# Patient Record
Sex: FEMALE | Race: OTHER | HISPANIC OR LATINO | Employment: FULL TIME | URBAN - METROPOLITAN AREA
[De-identification: names, ages, dates, MRNs, and addresses within clinical notes are randomized per-mention and may not be internally consistent; named-entity substitution may affect disease eponyms.]

---

## 2017-07-16 ENCOUNTER — HOSPITAL ENCOUNTER (OUTPATIENT)
Dept: RADIOLOGY | Facility: MEDICAL CENTER | Age: 22
End: 2017-07-16
Attending: FAMILY MEDICINE
Payer: COMMERCIAL

## 2017-07-16 ENCOUNTER — OFFICE VISIT (OUTPATIENT)
Dept: URGENT CARE | Facility: PHYSICIAN GROUP | Age: 22
End: 2017-07-16
Payer: COMMERCIAL

## 2017-07-16 VITALS
BODY MASS INDEX: 30.24 KG/M2 | HEIGHT: 59 IN | DIASTOLIC BLOOD PRESSURE: 66 MMHG | RESPIRATION RATE: 18 BRPM | HEART RATE: 76 BPM | TEMPERATURE: 98.4 F | WEIGHT: 150 LBS | SYSTOLIC BLOOD PRESSURE: 118 MMHG | OXYGEN SATURATION: 96 %

## 2017-07-16 DIAGNOSIS — R10.13 EPIGASTRIC PAIN: ICD-10-CM

## 2017-07-16 DIAGNOSIS — K59.00 CONSTIPATION, UNSPECIFIED CONSTIPATION TYPE: ICD-10-CM

## 2017-07-16 DIAGNOSIS — R42 DIZZINESS: ICD-10-CM

## 2017-07-16 DIAGNOSIS — R11.0 NAUSEA: ICD-10-CM

## 2017-07-16 LAB
APPEARANCE UR: CLEAR
BILIRUB UR STRIP-MCNC: NORMAL MG/DL
COLOR UR AUTO: YELLOW
GLUCOSE UR STRIP.AUTO-MCNC: NORMAL MG/DL
INT CON NEG: NEGATIVE
INT CON POS: POSITIVE
KETONES UR STRIP.AUTO-MCNC: NORMAL MG/DL
LEUKOCYTE ESTERASE UR QL STRIP.AUTO: NORMAL
NITRITE UR QL STRIP.AUTO: NORMAL
PH UR STRIP.AUTO: 6.5 [PH] (ref 5–8)
POC URINE PREGNANCY TEST: NORMAL
PROT UR QL STRIP: NORMAL MG/DL
RBC UR QL AUTO: NORMAL
SP GR UR STRIP.AUTO: 1.01
UROBILINOGEN UR STRIP-MCNC: NORMAL MG/DL

## 2017-07-16 PROCEDURE — 81002 URINALYSIS NONAUTO W/O SCOPE: CPT | Performed by: FAMILY MEDICINE

## 2017-07-16 PROCEDURE — 99214 OFFICE O/P EST MOD 30 MIN: CPT | Performed by: FAMILY MEDICINE

## 2017-07-16 PROCEDURE — 81025 URINE PREGNANCY TEST: CPT | Performed by: FAMILY MEDICINE

## 2017-07-16 PROCEDURE — 74020 DX-ABDOMEN-2 VIEWS: CPT

## 2017-07-16 NOTE — PROGRESS NOTES
Chief Complaint:    Chief Complaint   Patient presents with   • Dizziness     dizziness, nausea x5 days       History of Present Illness:    This is a new problem. For past 5 days, she has unpredictable dizziness, nausea (not associated with eating), and occl epigastric abdominal pain (can feel burning). Currently she is not dizzy or nauseous. Last felt dizzy yesterday. Has been having smaller volume of stools over the past week. Had recent bronchitis type of symptoms, but got better and was fine for around 2 weeks before current symptoms started. No tinnitus or decreased hearing. No fever, chills, vomiting, or urine symptoms. Has all abdominal organs. No antacid medication taken for symptoms. Mom had H. pylori and she is concerned she could have this.      Review of Systems:    Constitutional: Negative for fever, chills, and diaphoresis.   Eyes: Negative for change in vision, photophobia, pain, redness, and discharge.  ENT: Negative for ear pain, ear discharge, hearing loss, tinnitus, nasal congestion, nosebleeds, and sore throat.    Respiratory: Negative for cough, hemoptysis, sputum production, shortness of breath, wheezing, and stridor.    Cardiovascular: Negative for chest pain, palpitations, orthopnea, claudication, leg swelling, and PND.   Gastrointestinal: See HPI.  Genitourinary: Negative for dysuria, urinary urgency, urinary frequency, hematuria, and flank pain.   Musculoskeletal: Negative for myalgias, joint pain, neck pain, and back pain.   Skin: Negative for rash and itching.   Neurological: See HPI.  Endo: Negative for polydipsia.   Heme: Does not bruise/bleed easily.   Psychiatric/Behavioral: Negative for depression, suicidal ideas, hallucinations, memory loss and substance abuse. The patient is not nervous/anxious and does not have insomnia.      Past Medical History:    History reviewed. No pertinent past medical history.    Past Surgical History:    History reviewed. No pertinent past surgical  "history.    Social History:    Social History     Social History   • Marital Status: Single     Spouse Name: N/A   • Number of Children: N/A   • Years of Education: N/A     Occupational History   • Not on file.     Social History Main Topics   • Smoking status: Never Smoker    • Smokeless tobacco: Not on file   • Alcohol Use: No   • Drug Use: No   • Sexual Activity: Not on file     Other Topics Concern   • Not on file     Social History Narrative       Family History:    History reviewed. No pertinent family history.    Medications:    No current outpatient prescriptions on file prior to visit.     No current facility-administered medications on file prior to visit.       Allergies:    No Known Allergies      Vitals:    Filed Vitals:    07/16/17 1441   BP: 118/66   Pulse: 76   Temp: 36.9 °C (98.4 °F)   Resp: 18   Height: 1.499 m (4' 11.02\")   Weight: 68.04 kg (150 lb)   SpO2: 96%       Physical Exam:    Constitutional: Vital signs reviewed. Appears well-developed and well-nourished. No acute distress.   Eyes: Sclera white, conjunctivae clear. PERRLA.  ENT: External ears normal. External auditory canals normal without discharge. TMs translucent and non-bulging. Hearing normal. Nasal mucosa pink. Lips/teeth are normal. Oral mucosa pink and moist. Posterior pharynx: WNL.   Neck: Neck supple.   Cardiovascular: Regular rate and rhythm. No murmur.   Pulmonary/Chest: Respirations non-labored. Clear to auscultation bilaterally.  Abdomen: Bowel sounds are normal active. Soft, non-distended, and non-tender to palpation. No hepatosplenomegaly.   Lymph: Cervical nodes without tenderness or enlargement.  Musculoskeletal: Normal gait. Normal range of motion. No muscular atrophy or weakness.  Neurological: Alert and oriented to person, place, and time. CN 2-12 intact. Muscle tone normal. Coordination normal. Light touch and sensation normal. [place] Hallpike maneuver negative bilaterally.  Skin: No rashes or lesions. Warm, dry, normal " turgor.  Psychiatric: Normal mood and affect. Behavior is normal. Judgment and thought content normal.     Diagnostics:    [id number]-[id number]-2 VIEWS (Order #[id number]) on 7/16/17       Narrative        7/16/2017 3:15 PM    HISTORY/REASON FOR EXAM:  Abdominal Pain.  Epigastric abdominal pain, nausea, dizziness    TECHNIQUE/EXAM DESCRIPTION AND NUMBER OF VIEWS:  2 view(s) of the abdomen.    COMPARISON: None    FINDINGS:  No evidence for small bowel obstruction.  Mildly increased colonic stool.  No gross mass or abnormal calcification.  Minimal S-shaped curvature of thoracolumbar spine.         Impression        No evidence of bowel obstruction.     Images and Rad report reviewed with her and copy of report to her.    POCT URINALYSIS (Order #[id number]) on 7/16/17       Component Results      Component Value Ref Range & Units Status     POC Color yellow Negative Final     POC Appearance clear Negative Final     POC Leukocyte Esterase neg Negative Final     POC Nitrites neg Negative Final     POC Urobiligen neg Negative (0.2) mg/dL Final     POC Protein neg Negative mg/dL Final     POC Urine PH 6.5 5.0 - 8.0 Final     POC Blood neg Negative Final     POC Specific Gravity 1.010 <1.005 - >1.030 Final     POC Ketones neg Negative mg/dL Final     POC Biliruben neg Negative mg/dL Final     POC Glucose neg Negative mg/dL Final         Last Resulted Time     Sun Jul 16, 2017  3:33 PM     POCT PREGNANCY (Order #[id number]) on 7/16/17       Component Results      Component Value Ref Range & Units Status     POC Urine Pregnancy Test NEG Negative Final     Internal Control Positive Positive  Final     Internal Control Negative Negative  Final         Last Resulted Time     Sun Jul 16, 2017  3:17 PM       Assessment / Plan:    1. Nausea  - POCT Urinalysis  - POCT PREGNANCY  - H. PYLORI, UREA BREATH TEST, ADULT; Future    2. Dizziness  - POCT Urinalysis  - POCT PREGNANCY  - H. PYLORI, UREA BREATH TEST, ADULT; Future    3. Epigastric pain  -  [id number]-9 VIEWS; Future  - H. PYLORI, UREA BREATH TEST, ADULT; Future    4. Constipation, unspecified constipation type  - [id number]-3 VIEWS; Future      Discussed with her DDX and management options.    Vitals and exam are benign. Diagnostics are non-revealing. Currently in room she feels well and does not feel need for any Rx medication.    I do not feel the mildly increased stool on abdominal x-rays will cause her symptoms. Rec'd increase fiber intake.    Agreeable to H. pylori breath test ordered.    Says may call [phone] (M) with result and OK to leave message with result.    Follow-up with PCP or urgent care if getting worse while waiting for test result.

## 2017-07-16 NOTE — MR AVS SNAPSHOT
"        Mary Jane Larsonron   2017 2:30 PM   Office Visit   MRN: [id number]    Department:  [facility]   Dept Phone:  [phone]    Description:  Female : 1995   Provider:  Lalit Martinez M.D.           Reason for Visit     Dizziness dizziness, nausea x5 days      Allergies as of 2017     No Known Allergies      You were diagnosed with     Nausea   [825044]       Dizziness   [987336]       Epigastric pain   [181389]       Constipation, unspecified constipation type   [2642244]         Vital Signs     Blood Pressure Pulse Temperature Respirations Height Weight    118/66 mmHg 76 36.9 °C (98.4 °F) 18 1.499 m (4' 11.02\") 68.04 kg (150 lb)    Body Mass Index Oxygen Saturation Smoking Status             30.28 kg/m2 96% Never Smoker          Basic Information     Date Of Birth Sex Race Ethnicity Preferred Language    1995 Female  or   Origin (Romanian,Malaysian,Argentine,[place]er, etc) English      Health Maintenance        Date Due Completion Dates    IMM HPV VACCINE (1 of 3 - Female 3 Dose Series) 2006 ---    IMM VARICELLA (CHICKENPOX) VACCINE (1 of 2 - 2 Dose Adolescent Series) 2008 ---    IMM HEP A VACCINE (2 of 2 - Standard Series) 2009    PAP SMEAR 2016 ---    IMM INFLUENZA (1) 2016, 2016, 3/5/2015    IMM DTaP/Tdap/Td Vaccine (7 - Td) 2019, 1999, 1997, 1996, 1995, 1995            Results     POCT Urinalysis      Component Value Standard Range & Units    POC Color yellow Negative    POC Appearance clear Negative    POC Leukocyte Esterase neg Negative    POC Nitrites neg Negative    POC Urobiligen neg Negative (0.2) mg/dL    POC Protein neg Negative mg/dL    POC Urine PH 6.5 5.0 - 8.0    POC Blood neg Negative    POC Specific Gravity 1.010 <1.005 - >1.030    POC Ketones neg Negative mg/dL    POC Biliruben neg Negative mg/dL    POC Glucose neg Negative mg/dL                POCT " PREGNANCY      Component Value Standard Range & Units    POC Urine Pregnancy Test NEG Negative    Internal Control Positive Positive     Internal Control Negative Negative                         Current Immunizations     Dtap Vaccine 7/16/1999, 6/6/1997, 1/22/1996, 1995, 1995    Hepatitis A Vaccine, Ped/Adol 2/12/2009    Hepatitis B Vaccine Non-Recombivax (Ped/Adol) 1/22/1996, 1995, 1995    Influenza TIV (IM) 11/26/2016, 1/9/2016    Influenza Vaccine Quad Inj (Preserved) 3/5/2015    MMR Vaccine 7/19/1999, 6/6/1997    Tdap Vaccine 8/12/2009      Below and/or attached are the medications your provider expects you to take. Review all of your home medications and newly ordered medications with your provider and/or pharmacist. Follow medication instructions as directed by your provider and/or pharmacist. Please keep your medication list with you and share with your provider. Update the information when medications are discontinued, doses are changed, or new medications (including over-the-counter products) are added; and carry medication information at all times in the event of emergency situations     Allergies:  No Known Allergies          Medications  Valid as of: July 16, 2017 -  4:05 PM    Generic Name Brand Name Tablet Size Instructions for use    .                 Medicines prescribed today were sent to:     [facility][id number] - [place], [place] - [address] [place] [id number]    Phone: [phone] Fax: [phone]    Open 24 Hours?: No      Medication refill instructions:       If your prescription bottle indicates you have medication refills left, it is not necessary to call your provider’s office. Please contact your pharmacy and they will refill your medication.    If your prescription bottle indicates you do not have any refills left, you may request refills at any time through one of the following ways: The online Intelligent Mobile Support system (except  Urgent Care), by calling your provider’s office, or by asking your pharmacy to contact your provider’s office with a refill request. Medication refills are processed only during regular business hours and may not be available until the next business day. Your provider may request additional information or to have a follow-up visit with you prior to refilling your medication.   *Please Note: Medication refills are assigned a new Rx number when refilled electronically. Your pharmacy may indicate that no refills were authorized even though a new prescription for the same medication is available at the pharmacy. Please request the medicine by name with the pharmacy before contacting your provider for a refill.        Your To Do List     Future Labs/Procedures Complete By Expires    BD-[id number]-5 VIEWS  As directed 7/23/2017    H. PYLORI, UREA BREATH TEST, ADULT  As directed 7/30/2017         CO-Value Access Code: [email or web address]  Expires: 8/15/2017  4:05 PM    CO-Value  A secure, online tool to manage your health information     [employer]’s CO-Value® is a secure, online tool that connects you to your personalized health information from the privacy of your home -- day or night - making it very easy for you to manage your healthcare. Once the activation process is completed, you can even access your medical information using the CO-Value selina, which is available for free in the Apple Selina store or Google Play store.     CO-Value provides the following levels of access (as shown below):   My Chart Features   [facility]  Urgent  Care [facility]  Primary Care  Doctor   Email your healthcare team securely and privately 24/7 X X X    Manage appointments: schedule your next appointment; view details of past/upcoming appointments X      Request prescription refills. X      View recent personal medical records, including lab and immunizations X X X X   View health record, including health  history, allergies, medications X X X X   Read reports about your outpatient visits, procedures, consult and ER notes X X X X   See your discharge summary, which is a recap of your hospital and/or ER visit that includes your diagnosis, lab results, and care plan. X X       How to register for Loot!:  1. Go to  [email or web address].  2. Click on the Sign Up Now box, which takes you to the New Member Sign Up page. You will need to provide the following information:  a. Enter your Loot! Access Code exactly as it appears at the top of this page. (You will not need to use this code after you’ve completed the sign-up process. If you do not sign up before the expiration date, you must request a new code.)   b. Enter your date of birth.   c. Enter your home email address.   d. Click Submit, and follow the next screen’s instructions.  3. Create a Loot! ID. This will be your Loot! login ID and cannot be changed, so think of one that is secure and easy to remember.  4. Create a Contribt password. You can change your password at any time.  5. Enter your Password Reset Question and Answer. This can be used at a later time if you forget your password.   6. Enter your e-mail address. This allows you to receive e-mail notifications when new information is available in Loot!.  7. Click Sign Up. You can now view your health information.    For assistance activating your Loot! account, call [phone]

## 2017-07-22 ENCOUNTER — HOSPITAL ENCOUNTER (OUTPATIENT)
Dept: LAB | Facility: MEDICAL CENTER | Age: 22
End: 2017-07-22
Attending: FAMILY MEDICINE
Payer: COMMERCIAL

## 2017-07-22 DIAGNOSIS — R11.0 NAUSEA: ICD-10-CM

## 2017-07-22 DIAGNOSIS — R10.13 EPIGASTRIC PAIN: ICD-10-CM

## 2017-07-22 DIAGNOSIS — R42 DIZZINESS: ICD-10-CM

## 2017-07-22 PROCEDURE — 83013 H PYLORI (C-13) BREATH: CPT

## 2017-07-24 LAB — UREA BREATH TEST QL: NEGATIVE

## 2017-11-29 ENCOUNTER — OFFICE VISIT (OUTPATIENT)
Dept: URGENT CARE | Facility: CLINIC | Age: 22
End: 2017-11-29
Payer: COMMERCIAL

## 2017-11-29 VITALS
HEART RATE: 86 BPM | TEMPERATURE: 98.2 F | DIASTOLIC BLOOD PRESSURE: 70 MMHG | WEIGHT: 150 LBS | RESPIRATION RATE: 16 BRPM | HEIGHT: 59 IN | SYSTOLIC BLOOD PRESSURE: 110 MMHG | BODY MASS INDEX: 30.24 KG/M2 | OXYGEN SATURATION: 94 %

## 2017-11-29 DIAGNOSIS — J22 LRTI (LOWER RESPIRATORY TRACT INFECTION): ICD-10-CM

## 2017-11-29 LAB
INT CON NEG: NEGATIVE
INT CON POS: POSITIVE
S PYO AG THROAT QL: NORMAL

## 2017-11-29 PROCEDURE — 87880 STREP A ASSAY W/OPTIC: CPT | Performed by: NURSE PRACTITIONER

## 2017-11-29 PROCEDURE — 99214 OFFICE O/P EST MOD 30 MIN: CPT | Performed by: NURSE PRACTITIONER

## 2017-11-29 RX ORDER — AZITHROMYCIN 250 MG/1
TABLET, FILM COATED ORAL
Qty: 6 TAB | Refills: 0 | Status: SHIPPED | OUTPATIENT
Start: 2017-11-29 | End: 2017-11-29 | Stop reason: CLARIF

## 2017-11-29 RX ORDER — BENZONATATE 100 MG/1
100 CAPSULE ORAL 3 TIMES DAILY PRN
Qty: 60 CAP | Refills: 0 | Status: SHIPPED | OUTPATIENT
Start: 2017-11-29 | End: 2018-05-24

## 2017-11-29 RX ORDER — AZITHROMYCIN 250 MG/1
TABLET, FILM COATED ORAL
Qty: 6 TAB | Refills: 0 | Status: SHIPPED | OUTPATIENT
Start: 2017-11-29 | End: 2018-05-24

## 2017-11-29 ASSESSMENT — ENCOUNTER SYMPTOMS
CHILLS: 0
COUGH: 1
WHEEZING: 0
VOMITING: 0
EYE PAIN: 0
DIZZINESS: 0
RHINORRHEA: 1
SORE THROAT: 1
FEVER: 0
MYALGIAS: 0
NAUSEA: 0
SHORTNESS OF BREATH: 0

## 2017-11-30 NOTE — PROGRESS NOTES
"  Subjective:     Mary Jane Prater is a 22 y.o. female who presents for Cough (x 2 days )       Cough   This is a new problem. The current episode started in the past 7 days. The problem has been gradually worsening. The problem occurs constantly. The cough is productive of sputum. Associated symptoms include chest pain, nasal congestion, postnasal drip, rhinorrhea and a sore throat. Pertinent negatives include no chills, ear congestion, fever, myalgias, rash, shortness of breath or wheezing. Nothing aggravates the symptoms. She has tried OTC cough suppressant for the symptoms. The treatment provided no relief. There is no history of bronchitis.   History reviewed. No pertinent past medical history.History reviewed. No pertinent surgical history.  Social History     Social History   • Marital status: Single     Spouse name: N/A   • Number of children: N/A   • Years of education: N/A     Occupational History   • Not on file.     Social History Main Topics   • Smoking status: Never Smoker   • Smokeless tobacco: Never Used   • Alcohol use No   • Drug use: No   • Sexual activity: Not on file     Other Topics Concern   • Not on file     Social History Narrative   • No narrative on file    History reviewed. No pertinent family history. Review of Systems   Constitutional: Negative for chills and fever.   HENT: Positive for postnasal drip, rhinorrhea and sore throat.    Eyes: Negative for pain.   Respiratory: Positive for cough. Negative for shortness of breath and wheezing.    Cardiovascular: Positive for chest pain.   Gastrointestinal: Negative for nausea and vomiting.   Genitourinary: Negative for hematuria.   Musculoskeletal: Negative for myalgias.   Skin: Negative for rash.   Neurological: Negative for dizziness.   No Known Allergies   Objective:   /70   Pulse 86   Temp 36.8 °C (98.2 °F)   Resp 16   Ht 1.499 m (4' 11\")   Wt 68 kg (150 lb)   SpO2 94%   BMI 30.30 kg/m²   Physical Exam   Constitutional: " She is oriented to person, place, and time. She appears well-developed and well-nourished. No distress.   HENT:   Head: Normocephalic and atraumatic.   Right Ear: Tympanic membrane normal.   Left Ear: Tympanic membrane normal.   Nose: Nose normal. Right sinus exhibits no maxillary sinus tenderness and no frontal sinus tenderness. Left sinus exhibits no maxillary sinus tenderness and no frontal sinus tenderness.   Mouth/Throat: Uvula is midline and mucous membranes are normal. Posterior oropharyngeal edema and posterior oropharyngeal erythema present. No tonsillar abscesses. Tonsils are 2+ on the right. Tonsils are 2+ on the left. No tonsillar exudate.   Eyes: Conjunctivae and EOM are normal. Pupils are equal, round, and reactive to light. Right eye exhibits no discharge. Left eye exhibits no discharge.   Cardiovascular: Normal rate and regular rhythm.    No murmur heard.  Pulmonary/Chest: Effort normal and breath sounds normal. No respiratory distress.   Abdominal: Soft. She exhibits no distension. There is no tenderness.   Neurological: She is alert and oriented to person, place, and time. She has normal reflexes. No sensory deficit.   Skin: Skin is warm, dry and intact.   Psychiatric: She has a normal mood and affect.         Assessment/Plan:   Assessment    1. LRTI (lower respiratory tract infection)  benzonatate (TESSALON) 100 MG Cap    POCT Rapid Strep A    azithromycin (ZITHROMAX) 250 MG Tab    DISCONTINUED: azithromycin (ZITHROMAX) 250 MG Tab     Strep test negative. Patient advised to continue the use of otc nsaids for comfort and the use of cepacol for sore throat. Patient without fever today.   Contingent antibiotic prescription given to patient to fill upon meeting criteria of guidelines discussed.     Patient given precautionary s/sx that mandate immediate follow up and evaluation in the ED. Advised of risks of not doing so.    DDX, Supportive care, and indications for immediate follow-up discussed with  patient.    Instructed to return to clinic or nearest emergency department if we are not available for any change in condition, further concerns, or worsening of symptoms.    The patient demonstrated a good understanding and agreed with the treatment plan.

## 2018-01-01 ENCOUNTER — HOSPITAL ENCOUNTER (OUTPATIENT)
Dept: RADIOLOGY | Facility: MEDICAL CENTER | Age: 23
End: 2018-01-01
Attending: NURSE PRACTITIONER
Payer: COMMERCIAL

## 2018-01-01 ENCOUNTER — OFFICE VISIT (OUTPATIENT)
Dept: URGENT CARE | Facility: PHYSICIAN GROUP | Age: 23
End: 2018-01-01
Payer: COMMERCIAL

## 2018-01-01 VITALS
TEMPERATURE: 97.9 F | OXYGEN SATURATION: 96 % | DIASTOLIC BLOOD PRESSURE: 90 MMHG | SYSTOLIC BLOOD PRESSURE: 118 MMHG | WEIGHT: 150 LBS | BODY MASS INDEX: 30.24 KG/M2 | HEART RATE: 81 BPM | HEIGHT: 59 IN

## 2018-01-01 DIAGNOSIS — J40 BRONCHITIS: ICD-10-CM

## 2018-01-01 DIAGNOSIS — R05.9 COUGH: ICD-10-CM

## 2018-01-01 LAB
FLUAV+FLUBV AG SPEC QL IA: NORMAL
INT CON NEG: NEGATIVE
INT CON POS: POSITIVE

## 2018-01-01 PROCEDURE — 99214 OFFICE O/P EST MOD 30 MIN: CPT | Performed by: NURSE PRACTITIONER

## 2018-01-01 PROCEDURE — 87804 INFLUENZA ASSAY W/OPTIC: CPT | Performed by: NURSE PRACTITIONER

## 2018-01-01 PROCEDURE — 71046 X-RAY EXAM CHEST 2 VIEWS: CPT

## 2018-01-01 RX ORDER — DOXYCYCLINE HYCLATE 100 MG
100 TABLET ORAL 2 TIMES DAILY
Qty: 14 TAB | Refills: 0 | Status: SHIPPED | OUTPATIENT
Start: 2018-01-01 | End: 2018-01-08

## 2018-01-01 RX ORDER — PREDNISONE 20 MG/1
TABLET ORAL
Qty: 10 TAB | Refills: 0 | Status: SHIPPED | OUTPATIENT
Start: 2018-01-01 | End: 2018-05-24

## 2018-01-01 RX ORDER — CODEINE PHOSPHATE AND GUAIFENESIN 10; 100 MG/5ML; MG/5ML
5 SOLUTION ORAL EVERY 4 HOURS PRN
Qty: 250 ML | Refills: 0 | Status: SHIPPED | OUTPATIENT
Start: 2018-01-01 | End: 2018-01-08

## 2018-01-01 NOTE — LETTER
January 1, 2018         Patient: Mary Jane Prater   YOB: 1995   Date of Visit: 1/1/2018           To Whom it May Concern:    Mary Jane Prater was seen in my clinic on 1/1/2018. Please excuse her from work 1/2/18.        Sincerely,           LEONILA Lu.  Electronically Signed

## 2018-01-04 ASSESSMENT — ENCOUNTER SYMPTOMS
SORE THROAT: 1
FEVER: 0
SHORTNESS OF BREATH: 1
SPUTUM PRODUCTION: 0
COUGH: 1

## 2018-01-04 NOTE — PROGRESS NOTES
"Subjective:      Mary Jane Prater is a 22 y.o. female who presents with Cough (cough, congestion x4-5 days)            Cough   This is a new problem. Episode onset: pt reports she had a smililar cold about one month ago. She felt better for a short amount of time, the cough persisted, and now she feels like all the symptoms have returned. She denies fever. She feels SOB at times with coughing fits. The problem has been gradually worsening. The cough is non-productive. Associated symptoms include nasal congestion, postnasal drip, a sore throat and shortness of breath. Pertinent negatives include no fever. She has tried rest and OTC cough suppressant for the symptoms. The treatment provided no relief. There is no history of asthma or pneumonia.       Review of Systems   Constitutional: Positive for malaise/fatigue. Negative for fever.   HENT: Positive for congestion, postnasal drip and sore throat.    Respiratory: Positive for cough and shortness of breath. Negative for sputum production.    All other systems reviewed and are negative.         Objective:     /90   Pulse 81   Temp 36.6 °C (97.9 °F)   Ht 1.499 m (4' 11\")   Wt 68 kg (150 lb)   SpO2 96%   BMI 30.30 kg/m²      Physical Exam   Constitutional: She is oriented to person, place, and time. Vital signs are normal. She appears well-developed and well-nourished.   HENT:   Head: Normocephalic and atraumatic.   Right Ear: Tympanic membrane and external ear normal.   Left Ear: Tympanic membrane and external ear normal.   Nose: Mucosal edema and rhinorrhea present.   Mouth/Throat: Posterior oropharyngeal erythema present.   Eyes: EOM are normal. Pupils are equal, round, and reactive to light.   Neck: Normal range of motion.   Cardiovascular: Normal rate and regular rhythm.    Pulmonary/Chest: Effort normal. She has rhonchi in the right upper field and the left upper field.   Harsh breath sounds bilateral LL   Musculoskeletal: Normal range of motion. "   Lymphadenopathy:        Head (right side): Submandibular adenopathy present.        Head (left side): Submandibular adenopathy present.   Neurological: She is alert and oriented to person, place, and time.   Skin: Skin is warm and dry. Capillary refill takes less than 2 seconds.   Psychiatric: She has a normal mood and affect. Her speech is normal and behavior is normal. Thought content normal.   Vitals reviewed.         CXR: no acute cardiopulmonary process by my read, radiology pending.  1/1/2018 4:01 PM    HISTORY/REASON FOR EXAM:  Cough.      TECHNIQUE/EXAM DESCRIPTION AND NUMBER OF VIEWS:  Two views of the chest.    COMPARISON:  None.    FINDINGS:  The heart is normal in size.  No pulmonary infiltrates or consolidations are noted.  No pleural effusions are appreciated.     Impression       No active disease.          Assessment/Plan:     1. Cough  - DX-CHEST-2 VIEWS; Future  - POCT Influenza A/B NEG    2. Bronchitis  - doxycycline (VIBRAMYCIN) 100 MG Tab; Take 1 Tab by mouth 2 times a day for 7 days.  Dispense: 14 Tab; Refill: 0  - predniSONE (DELTASONE) 20 MG Tab; Take 2 tabs PO daily for five days  Dispense: 10 Tab; Refill: 0  - guaifenesin-codeine (ROBITUSSIN AC) Solution oral solution; Take 5 mL by mouth every four hours as needed for Cough for up to 7 days.  Dispense: 250 mL; Refill: 0    Increase water intake  Sedating effects of cough syrup discussed, consent signed. Checked patient's  and find no evidence of narcotic misuse.  Tylenol and ibuprofen PRN pain/ fever  OTC cough medication for daytime  Daily Zyrtec and Flonase spray  Plenty of rest  RTC in 3 days if s/s do not improve  Supportive care, differential diagnoses, and indications for immediate follow-up discussed with patient.    Pathogenesis of diagnosis discussed including typical length and natural progression.      Instructed to return to  or nearest emergency department if symptoms fail to improve, for any change in condition, further  concerns, or new concerning symptoms.  Patient states understanding of the plan of care and discharge instructions.

## 2018-03-19 ENCOUNTER — APPOINTMENT (RX ONLY)
Dept: URBAN - METROPOLITAN AREA CLINIC 4 | Facility: CLINIC | Age: 23
Setting detail: DERMATOLOGY
End: 2018-03-19

## 2018-03-19 DIAGNOSIS — L60.8 OTHER NAIL DISORDERS: ICD-10-CM

## 2018-03-19 PROCEDURE — 99202 OFFICE O/P NEW SF 15 MIN: CPT

## 2018-03-19 PROCEDURE — ? COUNSELING

## 2018-03-19 PROCEDURE — ? ADDITIONAL NOTES

## 2018-03-19 PROCEDURE — ? DIAGNOSIS COMMENT

## 2018-03-19 ASSESSMENT — LOCATION SIMPLE DESCRIPTION DERM
LOCATION SIMPLE: RIGHT RING FINGERNAIL
LOCATION SIMPLE: RIGHT GREAT TOE
LOCATION SIMPLE: LEFT MIDDLE FINGERNAIL
LOCATION SIMPLE: RIGHT INDEX FINGERNAIL

## 2018-03-19 ASSESSMENT — LOCATION DETAILED DESCRIPTION DERM
LOCATION DETAILED: RIGHT INDEX FINGERNAIL
LOCATION DETAILED: RIGHT RING FINGERNAIL
LOCATION DETAILED: LEFT MIDDLE FINGERNAIL
LOCATION DETAILED: RIGHT GREAT TOENAIL

## 2018-03-19 ASSESSMENT — LOCATION ZONE DERM
LOCATION ZONE: FINGERNAIL
LOCATION ZONE: TOENAIL

## 2018-03-19 NOTE — PROCEDURE: ADDITIONAL NOTES
Additional Notes: Avoid artificial nails until underlying nail has grown in completely. \\nCase discussed and patient examined by Dr. Way

## 2018-03-19 NOTE — HPI: NAIL DYSTROPHY
How Severe Is It?: mild
Is This A New Presentation, Or A Follow-Up?: Nail Dystrophy
Additional History: Patient states all of 2017 she wore artificial nails and 2018 she has not worn artificial nails. Patient states two weeks ago she noticed her nail beds lifting from the cuticle. Patient is in for further evaluation and management. Patient had hand, foot and mouth disease January 19, 2017

## 2018-05-24 ENCOUNTER — HOSPITAL ENCOUNTER (EMERGENCY)
Facility: MEDICAL CENTER | Age: 23
End: 2018-05-24
Attending: EMERGENCY MEDICINE | Admitting: SURGERY
Payer: COMMERCIAL

## 2018-05-24 ENCOUNTER — APPOINTMENT (OUTPATIENT)
Dept: RADIOLOGY | Facility: MEDICAL CENTER | Age: 23
End: 2018-05-24
Attending: EMERGENCY MEDICINE
Payer: COMMERCIAL

## 2018-05-24 VITALS
OXYGEN SATURATION: 97 % | WEIGHT: 161.16 LBS | SYSTOLIC BLOOD PRESSURE: 113 MMHG | RESPIRATION RATE: 16 BRPM | HEART RATE: 82 BPM | BODY MASS INDEX: 32.49 KG/M2 | TEMPERATURE: 98.1 F | DIASTOLIC BLOOD PRESSURE: 62 MMHG | HEIGHT: 59 IN

## 2018-05-24 DIAGNOSIS — K80.20 SYMPTOMATIC CHOLELITHIASIS: Primary | ICD-10-CM

## 2018-05-24 DIAGNOSIS — K80.20 CALCULUS OF GALLBLADDER WITHOUT CHOLECYSTITIS WITHOUT OBSTRUCTION: ICD-10-CM

## 2018-05-24 DIAGNOSIS — R10.13 EPIGASTRIC ABDOMINAL PAIN: ICD-10-CM

## 2018-05-24 DIAGNOSIS — G89.18 POSTOPERATIVE PAIN: ICD-10-CM

## 2018-05-24 LAB
ALBUMIN SERPL BCP-MCNC: 4.7 G/DL (ref 3.2–4.9)
ALBUMIN/GLOB SERPL: 1.4 G/DL
ALP SERPL-CCNC: 71 U/L (ref 30–99)
ALT SERPL-CCNC: 18 U/L (ref 2–50)
ANION GAP SERPL CALC-SCNC: 8 MMOL/L (ref 0–11.9)
APPEARANCE UR: CLEAR
AST SERPL-CCNC: 15 U/L (ref 12–45)
BASOPHILS # BLD AUTO: 0.2 % (ref 0–1.8)
BASOPHILS # BLD: 0.03 K/UL (ref 0–0.12)
BILIRUB SERPL-MCNC: 0.6 MG/DL (ref 0.1–1.5)
BILIRUB UR QL STRIP.AUTO: NEGATIVE
BUN SERPL-MCNC: 10 MG/DL (ref 8–22)
CALCIUM SERPL-MCNC: 10 MG/DL (ref 8.5–10.5)
CHLORIDE SERPL-SCNC: 105 MMOL/L (ref 96–112)
CO2 SERPL-SCNC: 23 MMOL/L (ref 20–33)
COLOR UR: YELLOW
CREAT SERPL-MCNC: 0.73 MG/DL (ref 0.5–1.4)
EKG IMPRESSION: NORMAL
EOSINOPHIL # BLD AUTO: 0.03 K/UL (ref 0–0.51)
EOSINOPHIL NFR BLD: 0.2 % (ref 0–6.9)
ERYTHROCYTE [DISTWIDTH] IN BLOOD BY AUTOMATED COUNT: 38.7 FL (ref 35.9–50)
GLOBULIN SER CALC-MCNC: 3.4 G/DL (ref 1.9–3.5)
GLUCOSE SERPL-MCNC: 119 MG/DL (ref 65–99)
GLUCOSE UR STRIP.AUTO-MCNC: NEGATIVE MG/DL
HCG UR QL: NEGATIVE
HCT VFR BLD AUTO: 45.5 % (ref 37–47)
HGB BLD-MCNC: 15.9 G/DL (ref 12–16)
IMM GRANULOCYTES # BLD AUTO: 0.07 K/UL (ref 0–0.11)
IMM GRANULOCYTES NFR BLD AUTO: 0.5 % (ref 0–0.9)
KETONES UR STRIP.AUTO-MCNC: NEGATIVE MG/DL
LEUKOCYTE ESTERASE UR QL STRIP.AUTO: NEGATIVE
LIPASE SERPL-CCNC: 17 U/L (ref 11–82)
LYMPHOCYTES # BLD AUTO: 2.53 K/UL (ref 1–4.8)
LYMPHOCYTES NFR BLD: 17 % (ref 22–41)
MCH RBC QN AUTO: 31.1 PG (ref 27–33)
MCHC RBC AUTO-ENTMCNC: 34.9 G/DL (ref 33.6–35)
MCV RBC AUTO: 88.9 FL (ref 81.4–97.8)
MICRO URNS: NORMAL
MONOCYTES # BLD AUTO: 0.66 K/UL (ref 0–0.85)
MONOCYTES NFR BLD AUTO: 4.4 % (ref 0–13.4)
NEUTROPHILS # BLD AUTO: 11.57 K/UL (ref 2–7.15)
NEUTROPHILS NFR BLD: 77.7 % (ref 44–72)
NITRITE UR QL STRIP.AUTO: NEGATIVE
NRBC # BLD AUTO: 0 K/UL
NRBC BLD-RTO: 0 /100 WBC
PH UR STRIP.AUTO: 6.5 [PH]
PLATELET # BLD AUTO: 359 K/UL (ref 164–446)
PMV BLD AUTO: 9.2 FL (ref 9–12.9)
POTASSIUM SERPL-SCNC: 3.5 MMOL/L (ref 3.6–5.5)
PROT SERPL-MCNC: 8.1 G/DL (ref 6–8.2)
PROT UR QL STRIP: NEGATIVE MG/DL
RBC # BLD AUTO: 5.12 M/UL (ref 4.2–5.4)
RBC UR QL AUTO: NEGATIVE
SODIUM SERPL-SCNC: 136 MMOL/L (ref 135–145)
SP GR UR REFRACTOMETRY: 1.02
SP GR UR STRIP.AUTO: 1.02
UROBILINOGEN UR STRIP.AUTO-MCNC: 0.2 MG/DL
WBC # BLD AUTO: 14.9 K/UL (ref 4.8–10.8)

## 2018-05-24 PROCEDURE — 501583 HCHG TROCAR, THRD CAN&SEAL 5X100: Performed by: SURGERY

## 2018-05-24 PROCEDURE — 85025 COMPLETE CBC W/AUTO DIFF WBC: CPT

## 2018-05-24 PROCEDURE — 96376 TX/PRO/DX INJ SAME DRUG ADON: CPT

## 2018-05-24 PROCEDURE — 501399 HCHG SPECIMAN BAG, ENDO CATC: Performed by: SURGERY

## 2018-05-24 PROCEDURE — 700101 HCHG RX REV CODE 250

## 2018-05-24 PROCEDURE — 501838 HCHG SUTURE GENERAL: Performed by: SURGERY

## 2018-05-24 PROCEDURE — 160048 HCHG OR STATISTICAL LEVEL 1-5: Performed by: SURGERY

## 2018-05-24 PROCEDURE — 96375 TX/PRO/DX INJ NEW DRUG ADDON: CPT

## 2018-05-24 PROCEDURE — 93005 ELECTROCARDIOGRAM TRACING: CPT | Performed by: EMERGENCY MEDICINE

## 2018-05-24 PROCEDURE — 502571 HCHG PACK, LAP CHOLE: Performed by: SURGERY

## 2018-05-24 PROCEDURE — 160002 HCHG RECOVERY MINUTES (STAT): Performed by: SURGERY

## 2018-05-24 PROCEDURE — 700111 HCHG RX REV CODE 636 W/ 250 OVERRIDE (IP)

## 2018-05-24 PROCEDURE — 88304 TISSUE EXAM BY PATHOLOGIST: CPT

## 2018-05-24 PROCEDURE — 160039 HCHG SURGERY MINUTES - EA ADDL 1 MIN LEVEL 3: Performed by: SURGERY

## 2018-05-24 PROCEDURE — 160028 HCHG SURGERY MINUTES - 1ST 30 MINS LEVEL 3: Performed by: SURGERY

## 2018-05-24 PROCEDURE — 99291 CRITICAL CARE FIRST HOUR: CPT

## 2018-05-24 PROCEDURE — 500002 HCHG ADHESIVE, DERMABOND: Performed by: SURGERY

## 2018-05-24 PROCEDURE — 501570 HCHG TROCAR, SEPARATOR: Performed by: SURGERY

## 2018-05-24 PROCEDURE — 500697 HCHG HEMOCLIP, LARGE (ORANGE): Performed by: SURGERY

## 2018-05-24 PROCEDURE — 160036 HCHG PACU - EA ADDL 30 MINS PHASE I: Performed by: SURGERY

## 2018-05-24 PROCEDURE — 160046 HCHG PACU - 1ST 60 MINS PHASE II: Performed by: SURGERY

## 2018-05-24 PROCEDURE — 160025 RECOVERY II MINUTES (STATS): Performed by: SURGERY

## 2018-05-24 PROCEDURE — 80053 COMPREHEN METABOLIC PANEL: CPT

## 2018-05-24 PROCEDURE — 700105 HCHG RX REV CODE 258: Performed by: EMERGENCY MEDICINE

## 2018-05-24 PROCEDURE — 81025 URINE PREGNANCY TEST: CPT

## 2018-05-24 PROCEDURE — 501582 HCHG TROCAR, THRD BLADED: Performed by: SURGERY

## 2018-05-24 PROCEDURE — 76705 ECHO EXAM OF ABDOMEN: CPT

## 2018-05-24 PROCEDURE — 96374 THER/PROPH/DIAG INJ IV PUSH: CPT

## 2018-05-24 PROCEDURE — 81003 URINALYSIS AUTO W/O SCOPE: CPT

## 2018-05-24 PROCEDURE — 700111 HCHG RX REV CODE 636 W/ 250 OVERRIDE (IP): Performed by: EMERGENCY MEDICINE

## 2018-05-24 PROCEDURE — 83690 ASSAY OF LIPASE: CPT

## 2018-05-24 PROCEDURE — 160009 HCHG ANES TIME/MIN: Performed by: SURGERY

## 2018-05-24 PROCEDURE — 160035 HCHG PACU - 1ST 60 MINS PHASE I: Performed by: SURGERY

## 2018-05-24 RX ORDER — ONDANSETRON 4 MG/1
4 TABLET, FILM COATED ORAL EVERY 4 HOURS PRN
Qty: 20 TAB | Refills: 0 | Status: SHIPPED | OUTPATIENT
Start: 2018-05-24 | End: 2021-09-11

## 2018-05-24 RX ORDER — MORPHINE SULFATE 4 MG/ML
4 INJECTION, SOLUTION INTRAMUSCULAR; INTRAVENOUS ONCE
Status: COMPLETED | OUTPATIENT
Start: 2018-05-24 | End: 2018-05-24

## 2018-05-24 RX ORDER — BUPIVACAINE HYDROCHLORIDE AND EPINEPHRINE 5; 5 MG/ML; UG/ML
INJECTION, SOLUTION EPIDURAL; INTRACAUDAL; PERINEURAL
Status: DISCONTINUED | OUTPATIENT
Start: 2018-05-24 | End: 2018-05-24 | Stop reason: HOSPADM

## 2018-05-24 RX ORDER — HYDROCODONE BITARTRATE AND ACETAMINOPHEN 5; 325 MG/1; MG/1
1-2 TABLET ORAL EVERY 6 HOURS PRN
Qty: 30 TAB | Refills: 0 | Status: SHIPPED | OUTPATIENT
Start: 2018-05-24 | End: 2018-05-28

## 2018-05-24 RX ORDER — DOCUSATE SODIUM 100 MG/1
100 CAPSULE, LIQUID FILLED ORAL 2 TIMES DAILY
Qty: 30 CAP | Refills: 3 | Status: SHIPPED | OUTPATIENT
Start: 2018-05-24 | End: 2021-09-11

## 2018-05-24 RX ORDER — MORPHINE SULFATE 10 MG/ML
2-4 INJECTION, SOLUTION INTRAMUSCULAR; INTRAVENOUS
Status: DISCONTINUED | OUTPATIENT
Start: 2018-05-24 | End: 2018-05-24 | Stop reason: HOSPADM

## 2018-05-24 RX ORDER — ONDANSETRON 2 MG/ML
4 INJECTION INTRAMUSCULAR; INTRAVENOUS ONCE
Status: COMPLETED | OUTPATIENT
Start: 2018-05-24 | End: 2018-05-24

## 2018-05-24 RX ORDER — HYDROCODONE BITARTRATE AND ACETAMINOPHEN 5; 325 MG/1; MG/1
1-2 TABLET ORAL EVERY 6 HOURS PRN
Status: DISCONTINUED | OUTPATIENT
Start: 2018-05-24 | End: 2018-05-24 | Stop reason: HOSPADM

## 2018-05-24 RX ORDER — SODIUM CHLORIDE, SODIUM LACTATE, POTASSIUM CHLORIDE, CALCIUM CHLORIDE 600; 310; 30; 20 MG/100ML; MG/100ML; MG/100ML; MG/100ML
1000 INJECTION, SOLUTION INTRAVENOUS ONCE
Status: COMPLETED | OUTPATIENT
Start: 2018-05-24 | End: 2018-05-24

## 2018-05-24 RX ADMIN — SODIUM CHLORIDE, POTASSIUM CHLORIDE, SODIUM LACTATE AND CALCIUM CHLORIDE 1000 ML: 600; 310; 30; 20 INJECTION, SOLUTION INTRAVENOUS at 05:35

## 2018-05-24 RX ADMIN — MORPHINE SULFATE 4 MG: 4 INJECTION INTRAVENOUS at 05:35

## 2018-05-24 RX ADMIN — PROCHLORPERAZINE EDISYLATE 10 MG: 5 INJECTION INTRAMUSCULAR; INTRAVENOUS at 05:35

## 2018-05-24 RX ADMIN — MORPHINE SULFATE 4 MG: 4 INJECTION INTRAVENOUS at 04:29

## 2018-05-24 RX ADMIN — ONDANSETRON 4 MG: 2 INJECTION INTRAMUSCULAR; INTRAVENOUS at 04:29

## 2018-05-24 ASSESSMENT — PAIN SCALES - GENERAL
PAINLEVEL_OUTOF10: 0
PAINLEVEL_OUTOF10: 7
PAINLEVEL_OUTOF10: 0

## 2018-05-24 NOTE — H&P
General Surgery H&P  -------------------------------------------------------------------------------------------------  Date of Service: 5/24/2018    Consulting Physician: Kwesi Clay M.D. [facility]  -------------------------------------------------------------------------------------------------    Chief complaint: abdominal pain    HPI: This is a 22 y.o. female who is presenting with with 24 hours worsening right upper quadrant abdominal pain.  Some nausea but no vomiting.    Has had episodes like this in the past but never this severe or persistent.  Previous episodes triggered by fatty food. Since presenting to the ER the symptoms have improved.    History reviewed. No pertinent past medical history.    History reviewed. No pertinent surgical history.    No current facility-administered medications for this encounter.        Social History     Social History   • Marital status: Single     Spouse name: N/A   • Number of children: N/A   • Years of education: N/A     Occupational History   • Not on file.     Social History Main Topics   • Smoking status: Never Smoker   • Smokeless tobacco: Never Used   • Alcohol use No   • Drug use: No   • Sexual activity: Not on file     Other Topics Concern   • Not on file     Social History Narrative   • No narrative on file       History reviewed. No pertinent family history.    Allergies:  Patient has no known allergies.    Review of Systems:  Constitutional: Negative for fever, chills, weight loss,   HENT:   Negative for hearing loss or tinnitus    Eyes:    Negative for blurred vision, double vision, or loss of vision  Respiratory:  Negative for cough, hemoptysis, or wheezing    Cardiac:  Negative for chest pain or palpitations or orthopnea  Vascular:  Negative for claudication or rest pain   Gastrointestinal: As per HPI   Genitourinary: Negative for dysuria, frequency, or hematuria   Musculoskeletal: Negative for myalgias, back pain, or joint  "pain  Skin:   Negative for itching or rash  Neurological:  Negative for dizziness, headaches, or tremors     Negative for speech disturbance     Negative for extremity weakness or paresthesias  Endo/Heme:  Negative for easy bruising or bleeding  Psychiatric:  Negative for depression, suicidal ideas, or hallucinations    Physical Exam:  Blood pressure 113/62, pulse 62, temperature 36.6 °C (97.8 °F), resp. rate 16, height 1.499 m (4' 11\"), weight 73.1 kg (161 lb 2.5 oz), last menstrual period 03/30/2018, SpO2 93 %.    Constitutional: Alert, oriented, no acute distress  HEENT:  Normocephalic and atraumatic, EOMI  Neck:   Supple, no JVD,   Cardiovascular: Regular rate and rhythm,   Pulmonary:  Good air entry bilaterally,   Abdominal:  Soft,  Non-distended     Tender to palpation in right upper quadrant     No rebound/guarding  Musculoskeletal: No edema, no tenderness  Neurological:  CN II-XII grossly intact, no focal deficits  Skin:   Skin is warm and dry. No rash noted.  Psychiatric:  Normal mood and affect.    Labs:  Recent Labs      05/24/18   0411   WBC  14.9*   RBC  5.12   HEMOGLOBIN  15.9   HEMATOCRIT  45.5   MCV  88.9   MCH  31.1   MCHC  34.9   RDW  38.7   PLATELETCT  359   MPV  9.2     Recent Labs      05/24/18   0411   SODIUM  136   POTASSIUM  3.5*   CHLORIDE  105   CO2  23   GLUCOSE  119*   BUN  10   CREATININE  0.73   CALCIUM  10.0         Recent Labs      05/24/18   0411   ASTSGOT  15   ALTSGPT  18   TBILIRUBIN  0.6   ALKPHOSPHAT  71   GLOBULIN  3.4       Radiology:  US: gallstones    Assessment: This is a 22 y.o. female with biliary cholic.    Plan:  IV ABx  OR for lap angus    I explained the details of the operation, alternatives, and potential risks, including but not limited to bleeding, infection, injury to bowel/organs/vessels/nerves, possible open incision, and risks of anesthesia. All questions were answered. Patient understands and agrees to proceed.    Kwesi Clay M.D.  [facility]  [phone]  Cell: [phone]

## 2018-05-24 NOTE — ED PROVIDER NOTES
"ED Provider Note    CHIEF COMPLAINT  Chief Complaint   Patient presents with   • Epigastric Pain     started at 2300; described as a \"sharp pain\"; rated at an 8/10   • N/V        HPI    Primary care provider: ANEL Pabon   History obtained from: Patient  History limited by: None     Mary Jane Prater is a 22 y.o. female who presents to the ED complaining of sharp intermittent moderate to severe epigastric pain that started around 11:00 tonight.  She denies radiation to this pain or pain anywhere else.  She also reports one episode of nausea and vomiting.  Patient states that she has had similar abdominal pains in the past that would resolve if she can just throw up or have a bowel movement.  She did have a bowel movement tonight but it did not resolve the pain and also the pain was not better after vomiting.  She states that she cannot sleep due to the pain which brought her to the ED.  She has not noticed anything that makes the pain better or worse so far.  She denies any fever.  She denies any problems with bowel movement and reports that her stools have been normal.  She denies any dysuria.  She reports that there may be a possibility of pregnancy.  No prior history of abdominal surgeries.  She declines any nausea or pain medicine at this time.      REVIEW OF SYSTEMS  Please see HPI for pertinent positives/negatives.  All other systems reviewed and are negative.     PAST MEDICAL HISTORY  No past medical history on file.     SURGICAL HISTORY  History reviewed. No pertinent surgical history.     SOCIAL HISTORY  Social History     Social History Main Topics   • Smoking status: Never Smoker   • Smokeless tobacco: Never Used   • Alcohol use No   • Drug use: No   • Sexual activity: Not on file        FAMILY HISTORY  History reviewed. No pertinent family history.     CURRENT MEDICATIONS  Home Medications     Reviewed by Cinthya Hardy (Pharmacy Tech) on 05/24/18 at 0658  Med List Status: Complete " "  Medication Last Dose Status        Patient Jerry Taking any Medications                        ALLERGIES  No Known Allergies     PHYSICAL EXAM  VITAL SIGNS: /75   Pulse 65   Temp 36.6 °C (97.8 °F)   Resp 16   Ht 1.499 m (4' 11\")   Wt 73.1 kg (161 lb 2.5 oz)   LMP 03/30/2018 (Approximate)   SpO2 95%   BMI 32.55 kg/m²  @ESTHER[[id number]::@     Pulse ox interpretation: 98% I interpret this pulse ox as normal     Constitutional: Well developed, well nourished, alert in no apparent distress, nontoxic appearance    HENT: No external signs of trauma, normocephalic, oropharynx moist and clear, nose normal    Eyes: PERRL, conjunctiva without erythema, no discharge, no icterus    Neck: Soft and supple, trachea midline, no stridor, no tenderness, no LAD, no JVD, good ROM    Cardiovascular: Regular rate and rhythm, no murmurs/rubs/gallops, strong distal pulses and good perfusion    Thorax & Lungs: No respiratory distress, CTAB, no chest tenderness    Abdomen: Soft, mild epigastric tenderness to palpation, nondistended, no guarding, no rebound, normal BS    Back: No CVAT    Extremities: No clubbing, no cyanosis, no edema, no gross deformity, good ROM, no tenderness, intact distal pulses with brisk cap refill    Skin: Warm, dry, no pallor/cyanosis, no rash noted    Lymphatic: No lymphadenopathy noted    Neuro: A/O times 3, no focal deficits noted    Psychiatric: Cooperative, normal mood and affect, normal judgement          DIAGNOSTIC STUDIES / PROCEDURES    EKG  12 Lead EKG obtained at 0354 and interpreted by me:   Rate: 62   Rhythm: Sinus rhythm   Ectopy: None  Intervals: Normal   Axis: Normal   QRS: Normal   ST segments: Normal  T Waves: Normal    Clinical Impression: Sinus rhythm without acute ST-T wave changes       LABS  All labs reviewed by me.     Results for orders placed or performed during the hospital encounter of 05/24/18   CBC WITH DIFFERENTIAL   Result Value Ref Range    WBC 14.9 (H) 4.8 - 10.8 K/uL    " RBC 5.12 4.20 - 5.40 M/uL    Hemoglobin 15.9 12.0 - 16.0 g/dL    Hematocrit 45.5 37.0 - 47.0 %    MCV 88.9 81.4 - 97.8 fL    MCH 31.1 27.0 - 33.0 pg    MCHC 34.9 33.6 - 35.0 g/dL    RDW 38.7 35.9 - 50.0 fL    Platelet Count 359 164 - 446 K/uL    MPV 9.2 9.0 - 12.9 fL    Neutrophils-Polys 77.70 (H) 44.00 - 72.00 %    Lymphocytes 17.00 (L) 22.00 - 41.00 %    Monocytes 4.40 0.00 - 13.40 %    Eosinophils 0.20 0.00 - 6.90 %    Basophils 0.20 0.00 - 1.80 %    Immature Granulocytes 0.50 0.00 - 0.90 %    Nucleated RBC 0.00 /100 WBC    Neutrophils (Absolute) 11.57 (H) 2.00 - 7.15 K/uL    Lymphs (Absolute) 2.53 1.00 - 4.80 K/uL    Monos (Absolute) 0.66 0.00 - 0.85 K/uL    Eos (Absolute) 0.03 0.00 - 0.51 K/uL    Baso (Absolute) 0.03 0.00 - 0.12 K/uL    Immature Granulocytes (abs) 0.07 0.00 - 0.11 K/uL    NRBC (Absolute) 0.00 K/uL   COMP METABOLIC PANEL   Result Value Ref Range    Sodium 136 135 - 145 mmol/L    Potassium 3.5 (L) 3.6 - 5.5 mmol/L    Chloride 105 96 - 112 mmol/L    Co2 23 20 - 33 mmol/L    Anion Gap 8.0 0.0 - 11.9    Glucose 119 (H) 65 - 99 mg/dL    Bun 10 8 - 22 mg/dL    Creatinine 0.73 0.50 - 1.40 mg/dL    Calcium 10.0 8.5 - 10.5 mg/dL    AST(SGOT) 15 12 - 45 U/L    ALT(SGPT) 18 2 - 50 U/L    Alkaline Phosphatase 71 30 - 99 U/L    Total Bilirubin 0.6 0.1 - 1.5 mg/dL    Albumin 4.7 3.2 - 4.9 g/dL    Total Protein 8.1 6.0 - 8.2 g/dL    Globulin 3.4 1.9 - 3.5 g/dL    A-G Ratio 1.4 g/dL   LIPASE   Result Value Ref Range    Lipase 17 11 - 82 U/L   URINALYSIS CULTURE, IF INDICATED   Result Value Ref Range    Color Yellow     Character Clear     Specific Gravity 1.020 <1.035    Ph 6.5 5.0 - 8.0    Glucose Negative Negative mg/dL    Ketones Negative Negative mg/dL    Protein Negative Negative mg/dL    Bilirubin Negative Negative    Urobilinogen, Urine 0.2 Negative    Nitrite Negative Negative    Leukocyte Esterase Negative Negative    Occult Blood Negative Negative    Micro Urine Req see below    BETA-HCG QUALITATIVE  URINE   Result Value Ref Range    Beta-Hcg Urine Negative Negative   REFRACTOMETER SG   Result Value Ref Range    Specific Gravity 1.020    ESTIMATED GFR   Result Value Ref Range    GFR If African American >60 >60 mL/min/1.73 m 2    GFR If Non African American >60 >60 mL/min/1.73 m 2   EKG (NOW)   Result Value Ref Range    Report       [facility] Emergency Dept.    Test Date:  2018  Pt Name:    JANET ARREOLA       Department: ER  MRN:        [id number]                      Room:        13  Gender:     Female                       Technician: [id number]  :        [id number]                   Requested By:SAMEER TONG  Order #:    [id number]                    Reading MD:    Measurements  Intervals                                Axis  Rate:       62                           P:          1  WV:         148                          QRS:        56  QRSD:       86                           T:          2  QT:         408  QTc:        415    Interpretive Statements  SINUS RHYTHM  No previous ECG available for comparison          RADIOLOGY  The radiologist's interpretation of all radiological studies have been reviewed by me.     US-GALLBLADDER   Final Result      Cholelithiasis without evidence for cholecystitis or biliary obstruction                COURSE & MEDICAL DECISION MAKING  Nursing notes, VS, PMSFHx reviewed in chart.       Differential diagnoses considered include but are not limited to: Cholecystitis/ascending cholangitis, gallstone/biliary colic, pancreatitis, PUD, gastritis, GERD, colitis, pregnancy/ectopic    0535: D/W Dr. Clay      Patient presents with her significant other to the ED with above complaint.  EKG without evidence of acute ischemic changes.  Labs showed leukocytosis but otherwise fairly unremarkable.  Ultrasound of the gallbladder with findings as above.  Patient was given Zofran for her nausea and morphine for pain.  She continued to have episodes of nausea and  vomiting as well as pain and subsequently was given Compazine and another dose of morphine.  I discussed the findings with the patient.  I discussed with her option of discharge if we can get her nausea and pain under control or admission for surgery.  Patient opted for the latter.  Discussed with Dr. Clay who agreed to take patient to the OR later today.  IV fluid was initiated and patient was kept NPO in anticipation for surgery.        FINAL IMPRESSION  1. Epigastric abdominal pain    2. Calculus of gallbladder without cholecystitis without obstruction           DISPOSITION  Patient will be taken to the OR by Dr. Clay      Electronically signed by: Ruben Kulkarni, 5/24/2018 3:49 AM      Portions of this record were made with voice recognition software.  Despite my review, spelling/grammar/context errors may still remain.  Interpretation of this chart should be taken in this context.

## 2018-05-24 NOTE — PROGRESS NOTES
Discharge Instructions: Laparoscopic Cholecystectomy  ==========================================    1. DIET: Upon discharge from the hospital you may resume your normal preoperative diet. Depending on how you are feeling and whether you have nausea or not, you may wish to stay with a bland diet for the first few days. However, you can advance this as quickly as you feel ready.    2. ACTIVITIES: After discharge from the hospital, you may resume routine activities. However, there should be no heavy lifting (greater than 15 pounds) and no strenuous activities for 2 weeks. Otherwise, routine activities of daily living are acceptable.    3. DRIVING: You may drive whenever you are off pain medications and are able to perform the activities needed to drive, i.e. turning, bending, twisting, etc.    4. BATHING: OK to shower starting one day after surgery.  The incisions are covered with skin glue which is waterproof.  It will start to peel off in 5-7 days which is normal.  Avoid submerging the incisions in water (tub, bath, pool) for at least a week.     5. BOWEL FUNCTION: A few patients, after this operation, will develop either frequent or loose stools after meals. This usually corrects itself after a few days to a few weeks. If this occurs, do not worry; it is not unusual and will resolve. Much more common than loose stools, is constipation. The combination of pain medication and decreased activity level can cause constipation in otherwise normal patients. If you feel this is occurring, take a laxative (Milk of Magnesia, Ex-Lax, Senokot, etc.) until the problem has resolved.    6. PAIN MEDICATION: You will be given a prescription for pain medication at discharge. Please take these as directed. It is important to remember not to take medications on an empty stomach as this may cause nausea.  You can transition from the prescription-strength pain medication to over-the-counter medications as your pain improves, such as  tylenol, ibuprofen, or Aleve.    7.CALL IF YOU HAVE: (1) Fevers to more than 101.5 F, (2) Unusual chest or leg pain, (3) Drainage or fluid from incision that may be foul smelling, increased tenderness or soreness at the wound or the wound edges are no longer together, redness or swelling at the incision site. Please do not hesitate to call with any other questions.     8. APPOINTMENT: Contact our office at [phone] to schedule a follow-up appointment about 2 weeks following your procedure.    9. Work-related paperwork: For any work-related paper work needs please contact Marlena at my office to let her know you had surgery and what paperwork you need help with.  Her number is [phone].    If you have any additional questions, please do not hesitate to call the office and speak to either myself or the physician on call.    Office address:  [address] [place] [id number]  [phone]    Kwesi Clay M.D.  [facility]

## 2018-05-24 NOTE — ED TRIAGE NOTES
".Mary Jane Prater  .22 y.o.  .  Chief Complaint   Patient presents with   • Epigastric Pain     started at 2300; described as a \"sharp pain\"; rated at an 8/10   • N/V     Patient ambulatory to triage for above complaints; NAD observed in triage; pt denies any PMH.   Patient A&O X4, speaking in full sentences, and responding appropriately to questions.   Patient placed in lobby and educated to notify staff of new or worsening symptoms.     "

## 2018-05-24 NOTE — OP REPORT
General Surgery Operative Report      Date of Service: 5/24/2018    Patient Name: Mary Jane Prater    Patient MRN:  [id number]    --------------------------------------------------------------------------------    Preoperative Diagnosis:  1) Biliary cholic    Postoperative Diagnosis:  Acute cholecystitis    Procedure Performed:  1) Laparoscopic cholecystectomy      --------------------------------------------------------------------------------    Surgeon:  Kwesi Clay MD    Assistant:  Jaimee MARKHAM    Anesthesia:  GETA, and local anesthetic    IVF:   Per anesthesia report    UOP:   No salazar    Est. Blood Loss: minimal     Drains:  none     Specimens:  gallbladder for permanent pathology    Findings:  Moderate inflammatory adhesions to surface of gallbladder    Complications: none     Disposition:  PACU in stable condition    --------------------------------------------------------------------------------    Indications:      Mary Jane Prater is a 22 y.o. female with abdominal pain, likely attributed to symptomatic cholelithiasis.  An extensive PARQ conference was held with the patient in regard to laparoscopic cholecystectomy.  The patient was made aware of the alternatives, including operative and non-operative management. The risks of bleeding, infection, damage to surrounding structures, need for reoperation, bile duct injury, stroke, MI, and death were discussed with the patient. The patient was given a chance to ask questions, and all questions were answered to their satisfaction. The patient demonstrated adequate understanding, seemed pleased with the plan, and wished to proceed.     Procedure in detail:  The patient was brought to the operating suite, placed supine on the operating table and general anesthesia was administered.  The patient's abdomen was prepped and draped in the sterile fashion using ChloraPrep.  A surgical time-out was called to identify the correct patient, procedure  and equipment and everyone was in an agreement.    Procedure began with infiltration of local anesthetic at the umbilicus and then a small incision was made.  The subcutaneous tissues were divided until  the fascia was encountered and then this was grasped with a Kocher clamp and a Veress needle was inserted.  Aspiration and saline drop test was normal and then the abdomen was insufflated to a pressure of 15.  The Veress needle was removed and a 5-mm trocar was inserted at the umbilicus.  The patient was positioned in a reverse Trendelenburg right side up position and the 5 mm camera was inserted to inspect the abdomen.  There was no evidence of trauma from insertion of the Veress needle or trocar. There was no evidence of a diffuse inflammatory process.  There were no masses and no abnormalities identified.  The gallbladder appeared moderately inflamed.    An 11mm trocar was inserted in the midline superior epigastric region, and two 5mm ports were inserted in the right upper quadrant, all under direct visualization.    The cystic  triangle was then carefully dissected using electrocautery to score the peritoneal veil and then the cystic duct was circumferentially dissected.  At this point, we placed 3 clips on the proximal cystic duct and one on the distal and then the duct was transected.  Further dissection was carried out until the cystic artery was identified.  It was dissected circumferentially, clipped twice proximally, once distally, and then transected.  Further dissection was carried out and no additional critical structures were identified and therefore the gallbladder was removed from the undersurface of the liver using electrocautery.  There was a small amount of blood loss and spillage of bile during this part of the procedure, which changed wound classification to Class 3.    The gallbladder was placed in an endocatch bag and brought out through the epigastric port site.  The epigastric port site  fascia was closed with an 0-vicryl suture.  All of the other ports were removed under direct visualization and no bleeding was seen.  Skin at all port sites was closed with subcuticular absorbable suture and then the incisions were covered with skin glue.     The patient tolerated the procedure well.  All counts were correct.  She was extubated and sent to recovery in stable condition.    Kwesi Clay MD  [facility]  [phone]  Cell: [phone]

## 2018-05-24 NOTE — DISCHARGE INSTRUCTIONS
ACTIVITY: Rest and take it easy for the first 24 hours.  A responsible adult is recommended to remain with you during that time.  It is normal to feel sleepy.  We encourage you to not do anything that requires balance, judgment or coordination.    MILD FLU-LIKE SYMPTOMS ARE NORMAL. YOU MAY EXPERIENCE GENERALIZED MUSCLE ACHES, THROAT IRRITATION, HEADACHE AND/OR SOME NAUSEA.    FOR 24 HOURS DO NOT:  Drive, operate machinery or run household appliances.  Drink beer or alcoholic beverages.   Make important decisions or sign legal documents.    SPECIAL INSTRUCTIONS: **See attached discharge instructions from Dr. Clay*    DIET: To avoid nausea, slowly advance diet as tolerated, avoiding spicy or greasy foods for the first day.  Add more substantial food to your diet according to your physician's instructions.  Babies can be fed formula or breast milk as soon as they are hungry.  INCREASE FLUIDS AND FIBER TO AVOID CONSTIPATION.    SURGICAL DRESSING/BATHING: *see attached**    FOLLOW-UP APPOINTMENT:  A follow-up appointment should be arranged with your doctor; call to schedule  Dr. Clay [phone].    You should CALL YOUR PHYSICIAN if you develop:  Fever greater than 101 degrees F.  Pain not relieved by medication, or persistent nausea or vomiting.  Excessive bleeding (blood soaking through dressing) or unexpected drainage from the wound.  Extreme redness or swelling around the incision site, drainage of pus or foul smelling drainage.  Inability to urinate or empty your bladder within 8 hours.  Problems with breathing or chest pain.    You should call [phone] if you develop problems with breathing or chest pain.  If you are unable to contact your doctor or surgical center, you should go to the nearest emergency room or urgent care center.  Physician's telephone #: *Dr. Clay [phone]**    If any questions arise, call your doctor.  If your doctor is not available, please feel free to call the Surgical Center  at [phone].  The Center is open Monday through Friday from 7AM to 7PM.  You can also call the HEALTH HOTLINE open 24 hours/day, 7 days/week and speak to a nurse at [phone], or toll free at [phone].    A registered nurse may call you a few days after your surgery to see how you are doing after your procedure.    MEDICATIONS: Resume taking daily medication.  Take prescribed pain medication with food.  If no medication is prescribed, you may take non-aspirin pain medication if needed.  PAIN MEDICATION CAN BE VERY CONSTIPATING.  Take a stool softener or laxative such as senokot, pericolace, or milk of magnesia if needed.    Prescription given for *Norco, colace, zofran**.  Last pain medication given at *NONE GIVEN.    If your physician has prescribed pain medication that includes Acetaminophen (Tylenol), do not take additional Acetaminophen (Tylenol) while taking the prescribed medication.    Depression / Suicide Risk    As you are discharged from this [facility] facility, it is important to learn how to keep safe from harming yourself.    Recognize the warning signs:  · Abrupt changes in personality, positive or negative- including increase in energy   · Giving away possessions  · Change in eating patterns- significant weight changes-  positive or negative  · Change in sleeping patterns- unable to sleep or sleeping all the time   · Unwillingness or inability to communicate  · Depression  · Unusual sadness, discouragement and loneliness  · Talk of wanting to die  · Neglect of personal appearance   · Rebelliousness- reckless behavior  · Withdrawal from people/activities they love  · Confusion- inability to concentrate     If you or a loved one observes any of these behaviors or has concerns about self-harm, here's what you can do:  · Talk about it- your feelings and reasons for harming yourself  · Remove any means that you might use to hurt yourself (examples: pills, rope, extension cords,  firearm)  · Get professional help from the community (Mental Health, Substance Abuse, psychological counseling)  · Do not be alone:Call your Safe Contact- someone whom you trust who will be there for you.  · Call your local CRISIS HOTLINE 175-1090 or 433-356-5874  · Call your local Children's Mobile Crisis Response Team [place] (268) 898-7040 or www.[employer]  · Call the toll free National Suicide Prevention Hotlines   · National Suicide Prevention Lifeline 208-840-DAWR (5569)  · National Hope Line Network 800-SUICIDE (710-2794)

## 2018-05-24 NOTE — PROGRESS NOTES
Surgery    OR today for lap angus    Kwesi Clay MD  General&Vascular Surgery  [facility]  Cell: [phone]  Office: [phone]

## 2019-03-25 ENCOUNTER — APPOINTMENT (RX ONLY)
Dept: URBAN - METROPOLITAN AREA CLINIC 4 | Facility: CLINIC | Age: 24
Setting detail: DERMATOLOGY
End: 2019-03-25

## 2019-03-25 DIAGNOSIS — L30.0 NUMMULAR DERMATITIS: ICD-10-CM

## 2019-03-25 PROCEDURE — ? PRESCRIPTION

## 2019-03-25 PROCEDURE — 99213 OFFICE O/P EST LOW 20 MIN: CPT

## 2019-03-25 PROCEDURE — ? ADDITIONAL NOTES

## 2019-03-25 PROCEDURE — ? MEDICATION COUNSELING

## 2019-03-25 PROCEDURE — ? COUNSELING

## 2019-03-25 RX ORDER — TRIAMCINOLONE ACETONIDE 1 MG/G
OINTMENT TOPICAL
Qty: 1 | Refills: 1 | Status: ERX | COMMUNITY
Start: 2019-03-25

## 2019-03-25 RX ADMIN — TRIAMCINOLONE ACETONIDE: 1 OINTMENT TOPICAL at 20:48

## 2019-03-25 ASSESSMENT — LOCATION DETAILED DESCRIPTION DERM
LOCATION DETAILED: RIGHT ANTERIOR SHOULDER
LOCATION DETAILED: LEFT VENTRAL DISTAL FOREARM

## 2019-03-25 ASSESSMENT — LOCATION SIMPLE DESCRIPTION DERM
LOCATION SIMPLE: LEFT FOREARM
LOCATION SIMPLE: RIGHT SHOULDER

## 2019-03-25 ASSESSMENT — LOCATION ZONE DERM: LOCATION ZONE: ARM

## 2019-03-25 NOTE — PROCEDURE: MEDICATION COUNSELING
Thalidomide Counseling: I discussed with the patient the risks of thalidomide including but not limited to birth defects, anxiety, weakness, chest pain, dizziness, cough and severe allergy.
Simponi Pregnancy And Lactation Text: The risk during pregnancy and breastfeeding is uncertain with this medication.
Cimetidine Counseling:  I discussed with the patient the risks of Cimetidine including but not limited to gynecomastia, headache, diarrhea, nausea, drowsiness, arrhythmias, pancreatitis, skin rashes, psychosis, bone marrow suppression and kidney toxicity.
Cimzia Counseling:  I discussed with the patient the risks of Cimzia including but not limited to immunosuppression, allergic reactions and infections.  The patient understands that monitoring is required including a PPD at baseline and must alert us or the primary physician if symptoms of infection or other concerning signs are noted.
Carac Pregnancy And Lactation Text: This medication is Pregnancy Category X and contraindicated in pregnancy and in women who may become pregnant. It is unknown if this medication is excreted in breast milk.
Minocycline Pregnancy And Lactation Text: This medication is Pregnancy Category D and not consider safe during pregnancy. It is also excreted in breast milk.
Otezla Pregnancy And Lactation Text: This medication is Pregnancy Category C and it isn't known if it is safe during pregnancy. It is unknown if it is excreted in breast milk.
Griseofulvin Counseling:  I discussed with the patient the risks of griseofulvin including but not limited to photosensitivity, cytopenia, liver damage, nausea/vomiting and severe allergy.  The patient understands that this medication is best absorbed when taken with a fatty meal (e.g., ice cream or french fries).
Solaraze Counseling:  I discussed with the patient the risks of Solaraze including but not limited to erythema, scaling, itching, weeping, crusting, and pain.
Humira Pregnancy And Lactation Text: This medication is Pregnancy Category B and is considered safe during pregnancy. It is unknown if this medication is excreted in breast milk.
Isotretinoin Counseling: Patient should get monthly blood tests, not donate blood, not drive at night if vision affected, not share medication, and not undergo elective surgery for 6 months after tx completed. Side effects reviewed, pt to contact office should one occur.
Hydroquinone Pregnancy And Lactation Text: This medication has not been assigned a Pregnancy Risk Category but animal studies failed to show danger with the topical medication. It is unknown if the medication is excreted in breast milk.
Colchicine Counseling:  Patient counseled regarding adverse effects including but not limited to stomach upset (nausea, vomiting, stomach pain, or diarrhea).  Patient instructed to limit alcohol consumption while taking this medication.  Colchicine may reduce blood counts especially with prolonged use.  The patient understands that monitoring of kidney function and blood counts may be required, especially at baseline. The patient verbalized understanding of the proper use and possible adverse effects of colchicine.  All of the patient's questions and concerns were addressed.
Protopic Pregnancy And Lactation Text: This medication is Pregnancy Category C. It is unknown if this medication is excreted in breast milk when applied topically.
Bexarotene Pregnancy And Lactation Text: This medication is Pregnancy Category X and should not be given to women who are pregnant or may become pregnant. This medication should not be used if you are breast feeding.
Ivermectin Counseling:  Patient instructed to take medication on an empty stomach with a full glass of water.  Patient informed of potential adverse effects including but not limited to nausea, diarrhea, dizziness, itching, and swelling of the extremities or lymph nodes.  The patient verbalized understanding of the proper use and possible adverse effects of ivermectin.  All of the patient's questions and concerns were addressed.
Hydroquinone Counseling:  Patient advised that medication may result in skin irritation, lightening (hypopigmentation), dryness, and burning.  In the event of skin irritation, the patient was advised to reduce the amount of the drug applied or use it less frequently.  Rarely, spots that are treated with hydroquinone can become darker (pseudoochronosis).  Should this occur, patient instructed to stop medication and call the office. The patient verbalized understanding of the proper use and possible adverse effects of hydroquinone.  All of the patient's questions and concerns were addressed.
Fluconazole Pregnancy And Lactation Text: This medication is Pregnancy Category C and it isn't know if it is safe during pregnancy. It is also excreted in breast milk.
Topical Sulfur Applications Pregnancy And Lactation Text: This medication is Pregnancy Category C and has an unknown safety profile during pregnancy. It is unknown if this topical medication is excreted in breast milk.
Include Pregnancy/Lactation Warning?: No
Glycopyrrolate Pregnancy And Lactation Text: This medication is Pregnancy Category B and is considered safe during pregnancy. It is unknown if it is excreted breast milk.
Cyclosporine Pregnancy And Lactation Text: This medication is Pregnancy Category C and it isn't know if it is safe during pregnancy. This medication is excreted in breast milk.
Cephalexin Pregnancy And Lactation Text: This medication is Pregnancy Category B and considered safe during pregnancy.  It is also excreted in breast milk but can be used safely for shorter doses.
5-Fu Counseling: 5-Fluorouracil Counseling:  I discussed with the patient the risks of 5-fluorouracil including but not limited to erythema, scaling, itching, weeping, crusting, and pain.
Oxybutynin Counseling:  I discussed with the patient the risks of oxybutynin including but not limited to skin rash, drowsiness, dry mouth, difficulty urinating, and blurred vision.
Methotrexate Counseling:  Patient counseled regarding adverse effects of methotrexate including but not limited to nausea, vomiting, abnormalities in liver function tests. Patients may develop mouth sores, rash, diarrhea, and abnormalities in blood counts. The patient understands that monitoring is required including LFT's and blood counts.  There is a rare possibility of scarring of the liver and lung problems that can occur when taking methotrexate. Persistent nausea, loss of appetite, pale stools, dark urine, cough, and shortness of breath should be reported immediately. Patient advised to discontinue methotrexate treatment at least three months before attempting to become pregnant.  I discussed the need for folate supplements while taking methotrexate.  These supplements can decrease side effects during methotrexate treatment. The patient verbalized understanding of the proper use and possible adverse effects of methotrexate.  All of the patient's questions and concerns were addressed.
Griseofulvin Pregnancy And Lactation Text: This medication is Pregnancy Category X and is known to cause serious birth defects. It is unknown if this medication is excreted in breast milk but breast feeding should be avoided.
Solaraze Pregnancy And Lactation Text: This medication is Pregnancy Category B and is considered safe. There is some data to suggest avoiding during the third trimester. It is unknown if this medication is excreted in breast milk.
Stelara Counseling:  I discussed with the patient the risks of ustekinumab including but not limited to immunosuppression, malignancy, posterior leukoencephalopathy syndrome, and serious infections.  The patient understands that monitoring is required including a PPD at baseline and must alert us or the primary physician if symptoms of infection or other concerning signs are noted.
Isotretinoin Pregnancy And Lactation Text: This medication is Pregnancy Category X and is considered extremely dangerous during pregnancy. It is unknown if it is excreted in breast milk.
Cimetidine Pregnancy And Lactation Text: This medication is Pregnancy Category B and is considered safe during pregnancy. It is also excreted in breast milk and breast feeding isn't recommended.
Colchicine Pregnancy And Lactation Text: This medication is Pregnancy Category C and isn't considered safe during pregnancy. It is excreted in breast milk.
Imiquimod Counseling:  I discussed with the patient the risks of imiquimod including but not limited to erythema, scaling, itching, weeping, crusting, and pain.  Patient understands that the inflammatory response to imiquimod is variable from person to person and was educated regarded proper titration schedule.  If flu-like symptoms develop, patient knows to discontinue the medication and contact us.
Clindamycin Pregnancy And Lactation Text: This medication can be used in pregnancy if certain situations. Clindamycin is also present in breast milk.
Cimzia Pregnancy And Lactation Text: This medication crosses the placenta but can be considered safe in certain situations. Cimzia may be excreted in breast milk.
Xolair Pregnancy And Lactation Text: This medication is Pregnancy Category B and is considered safe during pregnancy. This medication is excreted in breast milk.
Ilumya Counseling: I discussed with the patient the risks of tildrakizumab including but not limited to immunosuppression, malignancy, posterior leukoencephalopathy syndrome, and serious infections.  The patient understands that monitoring is required including a PPD at baseline and must alert us or the primary physician if symptoms of infection or other concerning signs are noted.
Clindamycin Counseling: I counseled the patient regarding use of clindamycin as an antibiotic for prophylactic and/or therapeutic purposes. Clindamycin is active against numerous classes of bacteria, including skin bacteria. Side effects may include nausea, diarrhea, gastrointestinal upset, rash, hives, yeast infections, and in rare cases, colitis.
Cosentyx Counseling:  I discussed with the patient the risks of Cosentyx including but not limited to worsening of Crohn's disease, immunosuppression, allergic reactions and infections.  The patient understands that monitoring is required including a PPD at baseline and must alert us or the primary physician if symptoms of infection or other concerning signs are noted.
Wartpeel Counseling:  I discussed with the patient the risks of Wartpeel including but not limited to erythema, scaling, itching, weeping, crusting, and pain.
Ivermectin Pregnancy And Lactation Text: This medication is Pregnancy Category C and it isn't known if it is safe during pregnancy. It is also excreted in breast milk.
Hydroxychloroquine Counseling:  I discussed with the patient that a baseline ophthalmologic exam is needed at the start of therapy and every year thereafter while on therapy. A CBC may also be warranted for monitoring.  The side effects of this medication were discussed with the patient, including but not limited to agranulocytosis, aplastic anemia, seizures, rashes, retinopathy, and liver toxicity. Patient instructed to call the office should any adverse effect occur.  The patient verbalized understanding of the proper use and possible adverse effects of Plaquenil.  All the patient's questions and concerns were addressed.
Quinolones Counseling:  I discussed with the patient the risks of fluoroquinolones including but not limited to GI upset, allergic reaction, drug rash, diarrhea, dizziness, photosensitivity, yeast infections, liver function test abnormalities, tendonitis/tendon rupture.
Thalidomide Pregnancy And Lactation Text: This medication is Pregnancy Category X and is absolutely contraindicated during pregnancy. It is unknown if it is excreted in breast milk.
Dapsone Counseling: I discussed with the patient the risks of dapsone including but not limited to hemolytic anemia, agranulocytosis, rashes, methemoglobinemia, kidney failure, peripheral neuropathy, headaches, GI upset, and liver toxicity.  Patients who start dapsone require monitoring including baseline LFTs and weekly CBCs for the first month, then every month thereafter.  The patient verbalized understanding of the proper use and possible adverse effects of dapsone.  All of the patient's questions and concerns were addressed.
Imiquimod Pregnancy And Lactation Text: This medication is Pregnancy Category C. It is unknown if this medication is excreted in breast milk.
Itraconazole Counseling:  I discussed with the patient the risks of itraconazole including but not limited to liver damage, nausea/vomiting, neuropathy, and severe allergy.  The patient understands that this medication is best absorbed when taken with acidic beverages such as non-diet cola or ginger ale.  The patient understands that monitoring is required including baseline LFTs and repeat LFTs at intervals.  The patient understands that they are to contact us or the primary physician if concerning signs are noted.
Methotrexate Pregnancy And Lactation Text: This medication is Pregnancy Category X and is known to cause fetal harm. This medication is excreted in breast milk.
Zyclara Counseling:  I discussed with the patient the risks of imiquimod including but not limited to erythema, scaling, itching, weeping, crusting, and pain.  Patient understands that the inflammatory response to imiquimod is variable from person to person and was educated regarded proper titration schedule.  If flu-like symptoms develop, patient knows to discontinue the medication and contact us.
High Dose Vitamin A Counseling: Side effects reviewed, pt to contact office should one occur.
Niacinamide Counseling: I recommended taking niacin or niacinamide, also know as vitamin B3, twice daily. Recent evidence suggests that taking vitamin B3 (500 mg twice daily) can reduce the risk of actinic keratoses and non-melanoma skin cancers. Side effects of vitamin B3 include flushing and headache.
Doxycycline Counseling:  Patient counseled regarding possible photosensitivity and increased risk for sunburn.  Patient instructed to avoid sunlight, if possible.  When exposed to sunlight, patients should wear protective clothing, sunglasses, and sunscreen.  The patient was instructed to call the office immediately if the following severe adverse effects occur:  hearing changes, easy bruising/bleeding, severe headache, or vision changes.  The patient verbalized understanding of the proper use and possible adverse effects of doxycycline.  All of the patient's questions and concerns were addressed.
Taltz Counseling: I discussed with the patient the risks of ixekizumab including but not limited to immunosuppression, serious infections, worsening of inflammatory bowel disease and drug reactions.  The patient understands that monitoring is required including a PPD at baseline and must alert us or the primary physician if symptoms of infection or other concerning signs are noted.
Hydroxychloroquine Pregnancy And Lactation Text: This medication has been shown to cause fetal harm but it isn't assigned a Pregnancy Risk Category. There are small amounts excreted in breast milk.
Azathioprine Counseling:  I discussed with the patient the risks of azathioprine including but not limited to myelosuppression, immunosuppression, hepatotoxicity, lymphoma, and infections.  The patient understands that monitoring is required including baseline LFTs, Creatinine, possible TPMP genotyping and weekly CBCs for the first month and then every 2 weeks thereafter.  The patient verbalized understanding of the proper use and possible adverse effects of azathioprine.  All of the patient's questions and concerns were addressed.
Valtrex Counseling: I discussed with the patient the risks of valacyclovir including but not limited to kidney damage, nausea, vomiting and severe allergy.  The patient understands that if the infection seems to be worsening or is not improving, they are to call.
Doxepin Counseling:  Patient advised that the medication is sedating and not to drive a car after taking this medication. Patient informed of potential adverse effects including but not limited to dry mouth, urinary retention, and blurry vision.  The patient verbalized understanding of the proper use and possible adverse effects of doxepin.  All of the patient's questions and concerns were addressed.
Infliximab Counseling:  I discussed with the patient the risks of infliximab including but not limited to myelosuppression, immunosuppression, autoimmune hepatitis, demyelinating diseases, lymphoma, and serious infections.  The patient understands that monitoring is required including a PPD at baseline and must alert us or the primary physician if symptoms of infection or other concerning signs are noted.
Oxybutynin Pregnancy And Lactation Text: This medication is Pregnancy Category B and is considered safe during pregnancy. It is unknown if it is excreted in breast milk.
Topical Retinoid counseling:  Patient advised to apply a pea-sized amount only at bedtime and wait 30 minutes after washing their face before applying.  If too drying, patient may add a non-comedogenic moisturizer. The patient verbalized understanding of the proper use and possible adverse effects of retinoids.  All of the patient's questions and concerns were addressed.
Doxycycline Pregnancy And Lactation Text: This medication is Pregnancy Category D and not consider safe during pregnancy. It is also excreted in breast milk but is considered safe for shorter treatment courses.
Niacinamide Pregnancy And Lactation Text: These medications are considered safe during pregnancy.
High Dose Vitamin A Pregnancy And Lactation Text: High dose vitamin A therapy is contraindicated during pregnancy and breast feeding.
Rifampin Pregnancy And Lactation Text: This medication is Pregnancy Category C and it isn't know if it is safe during pregnancy. It is also excreted in breast milk and should not be used if you are breast feeding.
Azithromycin Counseling:  I discussed with the patient the risks of azithromycin including but not limited to GI upset, allergic reaction, drug rash, diarrhea, and yeast infections.
Prednisone Counseling:  I discussed with the patient the risks of prolonged use of prednisone including but not limited to weight gain, insomnia, osteoporosis, mood changes, diabetes, susceptibility to infection, glaucoma and high blood pressure.  In cases where prednisone use is prolonged, patients should be monitored with blood pressure checks, serum glucose levels and an eye exam.  Additionally, the patient may need to be placed on GI prophylaxis, PCP prophylaxis, and calcium and vitamin D supplementation and/or a bisphosphonate.  The patient verbalized understanding of the proper use and the possible adverse effects of prednisone.  All of the patient's questions and concerns were addressed.
Valtrex Pregnancy And Lactation Text: this medication is Pregnancy Category B and is considered safe during pregnancy. This medication is not directly found in breast milk but it's metabolite acyclovir is present.
Doxepin Pregnancy And Lactation Text: This medication is Pregnancy Category C and it isn't known if it is safe during pregnancy. It is also excreted in breast milk and breast feeding isn't recommended.
Birth Control Pills Counseling: Birth Control Pill Counseling: I discussed with the patient the potential side effects of OCPs including but not limited to increased risk of stroke, heart attack, thrombophlebitis, deep venous thrombosis, hepatic adenomas, breast changes, GI upset, headaches, and depression.  The patient verbalized understanding of the proper use and possible adverse effects of OCPs. All of the patient's questions and concerns were addressed.
Drysol Counseling:  I discussed with the patient the risks of drysol/aluminum chloride including but not limited to skin rash, itching, irritation, burning.
Rifampin Counseling: I discussed with the patient the risks of rifampin including but not limited to liver damage, kidney damage, red-orange body fluids, nausea/vomiting and severe allergy.
Dupixent Counseling: I discussed with the patient the risks of dupilumab including but not limited to eye infection and irritation, cold sores, injection site reactions, worsening of asthma, allergic reactions and increased risk of parasitic infection.  Live vaccines should be avoided while taking dupilumab. Dupilumab will also interact with certain medications such as warfarin and cyclosporine. The patient understands that monitoring is required and they must alert us or the primary physician if symptoms of infection or other concerning signs are noted.
Azathioprine Pregnancy And Lactation Text: This medication is Pregnancy Category D and isn't considered safe during pregnancy. It is unknown if this medication is excreted in breast milk.
Dapsone Pregnancy And Lactation Text: This medication is Pregnancy Category C and is not considered safe during pregnancy or breast feeding.
Minoxidil Counseling: Minoxidil is a topical medication which can increase blood flow where it is applied. It is uncertain how this medication increases hair growth. Side effects are uncommon and include stinging and allergic reactions.
Nsaids Counseling: NSAID Counseling: I discussed with the patient that NSAIDs should be taken with food. Prolonged use of NSAIDs can result in the development of stomach ulcers.  Patient advised to stop taking NSAIDs if abdominal pain occurs.  The patient verbalized understanding of the proper use and possible adverse effects of NSAIDs.  All of the patient's questions and concerns were addressed.
Cellcept Counseling:  I discussed with the patient the risks of mycophenolate mofetil including but not limited to infection/immunosuppression, GI upset, hypokalemia, hypercholesterolemia, bone marrow suppression, lymphoproliferative disorders, malignancy, GI ulceration/bleed/perforation, colitis, interstitial lung disease, kidney failure, progressive multifocal leukoencephalopathy, and birth defects.  The patient understands that monitoring is required including a baseline creatinine and regular CBC testing. In addition, patient must alert us immediately if symptoms of infection or other concerning signs are noted.
Rituxan Counseling:  I discussed with the patient the risks of Rituxan infusions. Side effects can include infusion reactions, severe drug rashes including mucocutaneous reactions, reactivation of latent hepatitis and other infections and rarely progressive multifocal leukoencephalopathy.  All of the patient's questions and concerns were addressed.
Azithromycin Pregnancy And Lactation Text: This medication is considered safe during pregnancy and is also secreted in breast milk.
Hydroxyzine Pregnancy And Lactation Text: This medication is not safe during pregnancy and should not be taken. It is also excreted in breast milk and breast feeding isn't recommended.
Elidel Counseling: Patient may experience a mild burning sensation during topical application. Elidel is not approved in children less than 2 years of age. There have been case reports of hematologic and skin malignancies in patients using topical calcineurin inhibitors although causality is questionable.
Spironolactone Counseling: Patient advised regarding risks of diarrhea, abdominal pain, hyperkalemia, birth defects (for female patients), liver toxicity and renal toxicity. The patient may need blood work to monitor liver and kidney function and potassium levels while on therapy. The patient verbalized understanding of the proper use and possible adverse effects of spironolactone.  All of the patient's questions and concerns were addressed.
Tetracycline Counseling: Patient counseled regarding possible photosensitivity and increased risk for sunburn.  Patient instructed to avoid sunlight, if possible.  When exposed to sunlight, patients should wear protective clothing, sunglasses, and sunscreen.  The patient was instructed to call the office immediately if the following severe adverse effects occur:  hearing changes, easy bruising/bleeding, severe headache, or vision changes.  The patient verbalized understanding of the proper use and possible adverse effects of tetracycline.  All of the patient's questions and concerns were addressed. Patient understands to avoid pregnancy while on therapy due to potential birth defects.
Tazorac Pregnancy And Lactation Text: This medication is not safe during pregnancy. It is unknown if this medication is excreted in breast milk.
Drysol Pregnancy And Lactation Text: This medication is considered safe during pregnancy and breast feeding.
Birth Control Pills Pregnancy And Lactation Text: This medication should be avoided if pregnant and for the first 30 days post-partum.
Ketoconazole Counseling:   Patient counseled regarding improving absorption with orange juice.  Adverse effects include but are not limited to breast enlargement, headache, diarrhea, nausea, upset stomach, liver function test abnormalities, taste disturbance, and stomach pain.  There is a rare possibility of liver failure that can occur when taking ketoconazole. The patient understands that monitoring of LFTs may be required, especially at baseline. The patient verbalized understanding of the proper use and possible adverse effects of ketoconazole.  All of the patient's questions and concerns were addressed.
Tazorac Counseling:  Patient advised that medication is irritating and drying.  Patient may need to apply sparingly and wash off after an hour before eventually leaving it on overnight.  The patient verbalized understanding of the proper use and possible adverse effects of tazorac.  All of the patient's questions and concerns were addressed.
Dupixent Pregnancy And Lactation Text: This medication likely crosses the placenta but the risk for the fetus is uncertain. This medication is excreted in breast milk.
Hydroxyzine Counseling: Patient advised that the medication is sedating and not to drive a car after taking this medication.  Patient informed of potential adverse effects including but not limited to dry mouth, urinary retention, and blurry vision.  The patient verbalized understanding of the proper use and possible adverse effects of hydroxyzine.  All of the patient's questions and concerns were addressed.
Erivedge Counseling- I discussed with the patient the risks of Erivedge including but not limited to nausea, vomiting, diarrhea, constipation, weight loss, changes in the sense of taste, decreased appetite, muscle spasms, and hair loss.  The patient verbalized understanding of the proper use and possible adverse effects of Erivedge.  All of the patient's questions and concerns were addressed.
Erythromycin Counseling:  I discussed with the patient the risks of erythromycin including but not limited to GI upset, allergic reaction, drug rash, diarrhea, increase in liver enzymes, and yeast infections.
Tremfya Counseling: I discussed with the patient the risks of guselkumab including but not limited to immunosuppression, serious infections, worsening of inflammatory bowel disease and drug reactions.  The patient understands that monitoring is required including a PPD at baseline and must alert us or the primary physician if symptoms of infection or other concerning signs are noted.
Picato Counseling:  I discussed with the patient the risks of Picato including but not limited to erythema, scaling, itching, weeping, crusting, and pain.
Spironolactone Pregnancy And Lactation Text: This medication can cause feminization of the male fetus and should be avoided during pregnancy. The active metabolite is also found in breast milk.
Topical Clindamycin Counseling: Patient counseled that this medication may cause skin irritation or allergic reactions.  In the event of skin irritation, the patient was advised to reduce the amount of the drug applied or use it less frequently.   The patient verbalized understanding of the proper use and possible adverse effects of clindamycin.  All of the patient's questions and concerns were addressed.
Rituxan Pregnancy And Lactation Text: This medication is Pregnancy Category C and it isn't know if it is safe during pregnancy. It is unknown if this medication is excreted in breast milk but similar antibodies are known to be excreted.
Gabapentin Counseling: I discussed with the patient the risks of gabapentin including but not limited to dizziness, somnolence, fatigue and ataxia.
Siliq Counseling:  I discussed with the patient the risks of Siliq including but not limited to new or worsening depression, suicidal thoughts and behavior, immunosuppression, malignancy, posterior leukoencephalopathy syndrome, and serious infections.  The patient understands that monitoring is required including a PPD at baseline and must alert us or the primary physician if symptoms of infection or other concerning signs are noted. There is also a special program designed to monitor depression which is required with Siliq.
Ketoconazole Pregnancy And Lactation Text: This medication is Pregnancy Category C and it isn't know if it is safe during pregnancy. It is also excreted in breast milk and breast feeding isn't recommended.
Enbrel Counseling:  I discussed with the patient the risks of etanercept including but not limited to myelosuppression, immunosuppression, autoimmune hepatitis, demyelinating diseases, lymphoma, and infections.  The patient understands that monitoring is required including a PPD at baseline and must alert us or the primary physician if symptoms of infection or other concerning signs are noted.
Arava Counseling:  Patient counseled regarding adverse effects of Arava including but not limited to nausea, vomiting, abnormalities in liver function tests. Patients may develop mouth sores, rash, diarrhea, and abnormalities in blood counts. The patient understands that monitoring is required including LFTs and blood counts.  There is a rare possibility of scarring of the liver and lung problems that can occur when taking methotrexate. Persistent nausea, loss of appetite, pale stools, dark urine, cough, and shortness of breath should be reported immediately. Patient advised to discontinue Arava treatment and consult with a physician prior to attempting conception. The patient will have to undergo a treatment to eliminate Arava from the body prior to conception.
Bactrim Counseling:  I discussed with the patient the risks of sulfa antibiotics including but not limited to GI upset, allergic reaction, drug rash, diarrhea, dizziness, photosensitivity, and yeast infections.  Rarely, more serious reactions can occur including but not limited to aplastic anemia, agranulocytosis, methemoglobinemia, blood dyscrasias, liver or kidney failure, lung infiltrates or desquamative/blistering drug rashes.
Erythromycin Pregnancy And Lactation Text: This medication is Pregnancy Category B and is considered safe during pregnancy. It is also excreted in breast milk.
Nsaids Pregnancy And Lactation Text: These medications are considered safe up to 30 weeks gestation. It is excreted in breast milk.
Acitretin Counseling:  I discussed with the patient the risks of acitretin including but not limited to hair loss, dry lips/skin/eyes, liver damage, hyperlipidemia, depression/suicidal ideation, photosensitivity.  Serious rare side effects can include but are not limited to pancreatitis, pseudotumor cerebri, bony changes, clot formation/stroke/heart attack.  Patient understands that alcohol is contraindicated since it can result in liver toxicity and significantly prolong the elimination of the drug by many years.
Eucrisa Counseling: Patient may experience a mild burning sensation during topical application. Eucrisa is not approved in children less than 2 years of age.
Albendazole Counseling:  I discussed with the patient the risks of albendazole including but not limited to cytopenia, kidney damage, nausea/vomiting and severe allergy.  The patient understands that this medication is being used in an off-label manner.
Xeljanz Counseling: I discussed with the patient the risks of Xeljanz therapy including increased risk of infection, liver issues, headache, diarrhea, or cold symptoms. Live vaccines should be avoided. They were instructed to call if they have any problems.
Detail Level: Zone
Metronidazole Pregnancy And Lactation Text: This medication is Pregnancy Category B and considered safe during pregnancy.  It is also excreted in breast milk.
Clofazimine Counseling:  I discussed with the patient the risks of clofazimine including but not limited to skin and eye pigmentation, liver damage, nausea/vomiting, gastrointestinal bleeding and allergy.
Cyclophosphamide Counseling:  I discussed with the patient the risks of cyclophosphamide including but not limited to hair loss, hormonal abnormalities, decreased fertility, abdominal pain, diarrhea, nausea and vomiting, bone marrow suppression and infection. The patient understands that monitoring is required while taking this medication.
Metronidazole Counseling:  I discussed with the patient the risks of metronidazole including but not limited to seizures, nausea/vomiting, a metallic taste in the mouth, nausea/vomiting and severe allergy.
Cyclophosphamide Pregnancy And Lactation Text: This medication is Pregnancy Category D and it isn't considered safe during pregnancy. This medication is excreted in breast milk.
Benzoyl Peroxide Counseling: Patient counseled that medicine may cause skin irritation and bleach clothing.  In the event of skin irritation, the patient was advised to reduce the amount of the drug applied or use it less frequently.   The patient verbalized understanding of the proper use and possible adverse effects of benzoyl peroxide.  All of the patient's questions and concerns were addressed.
Odomzo Counseling- I discussed with the patient the risks of Odomzo including but not limited to nausea, vomiting, diarrhea, constipation, weight loss, changes in the sense of taste, decreased appetite, muscle spasms, and hair loss.  The patient verbalized understanding of the proper use and possible adverse effects of Odomzo.  All of the patient's questions and concerns were addressed.
Bactrim Pregnancy And Lactation Text: This medication is Pregnancy Category D and is known to cause fetal risk.  It is also excreted in breast milk.
Acitretin Pregnancy And Lactation Text: This medication is Pregnancy Category X and should not be given to women who are pregnant or may become pregnant in the future. This medication is excreted in breast milk.
Terbinafine Counseling: Patient counseling regarding adverse effects of terbinafine including but not limited to headache, diarrhea, rash, upset stomach, liver function test abnormalities, itching, taste/smell disturbance, nausea, abdominal pain, and flatulence.  There is a rare possibility of liver failure that can occur when taking terbinafine.  The patient understands that a baseline LFT and kidney function test may be required. The patient verbalized understanding of the proper use and possible adverse effects of terbinafine.  All of the patient's questions and concerns were addressed.
SSKI Counseling:  I discussed with the patient the risks of SSKI including but not limited to thyroid abnormalities, metallic taste, GI upset, fever, headache, acne, arthralgias, paraesthesias, lymphadenopathy, easy bleeding, arrhythmias, and allergic reaction.
Glycopyrrolate Counseling:  I discussed with the patient the risks of glycopyrrolate including but not limited to skin rash, drowsiness, dry mouth, difficulty urinating, and blurred vision.
Humira Counseling:  I discussed with the patient the risks of adalimumab including but not limited to myelosuppression, immunosuppression, autoimmune hepatitis, demyelinating diseases, lymphoma, and serious infections.  The patient understands that monitoring is required including a PPD at baseline and must alert us or the primary physician if symptoms of infection or other concerning signs are noted.
Sski Pregnancy And Lactation Text: This medication is Pregnancy Category D and isn't considered safe during pregnancy. It is excreted in breast milk.
Otezla Counseling: The side effects of Otezla were discussed with the patient, including but not limited to worsening or new depression, weight loss, diarrhea, nausea, upper respiratory tract infection, and headache. Patient instructed to call the office should any adverse effect occur.  The patient verbalized understanding of the proper use and possible adverse effects of Otezla.  All the patient's questions and concerns were addressed.
Carac Counseling:  I discussed with the patient the risks of Carac including but not limited to erythema, scaling, itching, weeping, crusting, and pain.
Minocycline Counseling: Patient advised regarding possible photosensitivity and discoloration of the teeth, skin, lips, tongue and gums.  Patient instructed to avoid sunlight, if possible.  When exposed to sunlight, patients should wear protective clothing, sunglasses, and sunscreen.  The patient was instructed to call the office immediately if the following severe adverse effects occur:  hearing changes, easy bruising/bleeding, severe headache, or vision changes.  The patient verbalized understanding of the proper use and possible adverse effects of minocycline.  All of the patient's questions and concerns were addressed.
Xelbraxtonz Pregnancy And Lactation Text: This medication is Pregnancy Category D and is not considered safe during pregnancy.  The risk during breast feeding is also uncertain.
Cephalexin Counseling: I counseled the patient regarding use of cephalexin as an antibiotic for prophylactic and/or therapeutic purposes. Cephalexin (commonly prescribed under brand name Keflex) is a cephalosporin antibiotic which is active against numerous classes of bacteria, including most skin bacteria. Side effects may include nausea, diarrhea, gastrointestinal upset, rash, hives, yeast infections, and in rare cases, hepatitis, kidney disease, seizures, fever, confusion, neurologic symptoms, and others. Patients with severe allergies to penicillin medications are cautioned that there is about a 10% incidence of cross-reactivity with cephalosporins. When possible, patients with penicillin allergies should use alternatives to cephalosporins for antibiotic therapy.
Benzoyl Peroxide Pregnancy And Lactation Text: This medication is Pregnancy Category C. It is unknown if benzoyl peroxide is excreted in breast milk.
Xolair Counseling:  Patient informed of potential adverse effects including but not limited to fever, muscle aches, rash and allergic reactions.  The patient verbalized understanding of the proper use and possible adverse effects of Xolair.  All of the patient's questions and concerns were addressed.
Fluconazole Counseling:  Patient counseled regarding adverse effects of fluconazole including but not limited to headache, diarrhea, nausea, upset stomach, liver function test abnormalities, taste disturbance, and stomach pain.  There is a rare possibility of liver failure that can occur when taking fluconazole.  The patient understands that monitoring of LFTs and kidney function test may be required, especially at baseline. The patient verbalized understanding of the proper use and possible adverse effects of fluconazole.  All of the patient's questions and concerns were addressed.
Protopic Counseling: Patient may experience a mild burning sensation during topical application. Protopic is not approved in children less than 2 years of age. There have been case reports of hematologic and skin malignancies in patients using topical calcineurin inhibitors although causality is questionable.
Bexarotene Counseling:  I discussed with the patient the risks of bexarotene including but not limited to hair loss, dry lips/skin/eyes, liver abnormalities, hyperlipidemia, pancreatitis, depression/suicidal ideation, photosensitivity, drug rash/allergic reactions, hypothyroidism, anemia, leukopenia, infection, cataracts, and teratogenicity.  Patient understands that they will need regular blood tests to check lipid profile, liver function tests, white blood cell count, thyroid function tests and pregnancy test if applicable.
Cyclosporine Counseling:  I discussed with the patient the risks of cyclosporine including but not limited to hypertension, gingival hyperplasia,myelosuppression, immunosuppression, liver damage, kidney damage, neurotoxicity, lymphoma, and serious infections. The patient understands that monitoring is required including baseline blood pressure, CBC, CMP, lipid panel and uric acid, and then 1-2 times monthly CMP and blood pressure.
Simponi Counseling:  I discussed with the patient the risks of golimumab including but not limited to myelosuppression, immunosuppression, autoimmune hepatitis, demyelinating diseases, lymphoma, and serious infections.  The patient understands that monitoring is required including a PPD at baseline and must alert us or the primary physician if symptoms of infection or other concerning signs are noted.
Topical Sulfur Applications Counseling: Topical Sulfur Counseling: Patient counseled that this medication may cause skin irritation or allergic reactions.  In the event of skin irritation, the patient was advised to reduce the amount of the drug applied or use it less frequently.   The patient verbalized understanding of the proper use and possible adverse effects of topical sulfur application.  All of the patient's questions and concerns were addressed.

## 2019-03-25 NOTE — PROCEDURE: ADDITIONAL NOTES
Detail Level: Simple
Additional Notes: Discussed in detail diagnosis. \\nDiscussed treatment and risks in detail with patient.  \\nWill prescribe TAC ointment and send over to pharmacy. \\nDiscussed in detail how to use medication ointment and informed patient can use on new spots.  \\nRecommend gentle cleansers and moisturizers daily, like Cetaphil or CeraVe. Moisturize within 3 minutes of showering.

## 2019-03-25 NOTE — HPI: RASH
What Type Of Note Output Would You Prefer (Optional)?: Standard Output
How Severe Is Your Rash?: mild
Is This A New Presentation, Or A Follow-Up?: Rash
Additional History: Patient has had similar in the past when she had a dog in the past but went away. This time it comes and goes.

## 2019-06-04 ENCOUNTER — NON-PROVIDER VISIT (OUTPATIENT)
Dept: URGENT CARE | Facility: PHYSICIAN GROUP | Age: 24
End: 2019-06-04

## 2019-06-04 DIAGNOSIS — Z02.1 PRE-EMPLOYMENT DRUG SCREENING: ICD-10-CM

## 2019-06-04 LAB
AMP AMPHETAMINE: NORMAL
COC COCAINE: NORMAL
INT CON NEG: NEGATIVE
INT CON POS: POSITIVE
MET METHAMPHETAMINES: NORMAL
OPI OPIATES: NORMAL
PCP PHENCYCLIDINE: NORMAL
POC DRUG COMMENT 753798-OCCUPATIONAL HEALTH: NEGATIVE
THC: NORMAL

## 2019-06-04 PROCEDURE — 80305 DRUG TEST PRSMV DIR OPT OBS: CPT | Performed by: PHYSICIAN ASSISTANT

## 2020-12-22 DIAGNOSIS — Z23 NEED FOR VACCINATION: ICD-10-CM

## 2020-12-31 ENCOUNTER — EMPLOYEE HEALTH (OUTPATIENT)
Dept: OCCUPATIONAL MEDICINE | Facility: CLINIC | Age: 25
End: 2020-12-31

## 2020-12-31 ENCOUNTER — HOSPITAL ENCOUNTER (OUTPATIENT)
Facility: MEDICAL CENTER | Age: 25
End: 2020-12-31
Attending: PREVENTIVE MEDICINE
Payer: COMMERCIAL

## 2020-12-31 ENCOUNTER — EH NON-PROVIDER (OUTPATIENT)
Dept: OCCUPATIONAL MEDICINE | Facility: CLINIC | Age: 25
End: 2020-12-31

## 2020-12-31 VITALS
DIASTOLIC BLOOD PRESSURE: 70 MMHG | WEIGHT: 150 LBS | OXYGEN SATURATION: 99 % | RESPIRATION RATE: 16 BRPM | TEMPERATURE: 97.9 F | BODY MASS INDEX: 30.24 KG/M2 | HEIGHT: 59 IN | HEART RATE: 84 BPM | SYSTOLIC BLOOD PRESSURE: 116 MMHG

## 2020-12-31 DIAGNOSIS — Z02.89 ENCOUNTER FOR OCCUPATIONAL HEALTH ASSESSMENT: ICD-10-CM

## 2020-12-31 DIAGNOSIS — Z02.1 PRE-EMPLOYMENT DRUG SCREENING: ICD-10-CM

## 2020-12-31 LAB
AMP AMPHETAMINE: NORMAL
BAR BARBITURATES: NORMAL
BZO BENZODIAZEPINES: NORMAL
COC COCAINE: NORMAL
INT CON NEG: NORMAL
INT CON POS: NORMAL
MDMA ECSTASY: NORMAL
MET METHAMPHETAMINES: NORMAL
MTD METHADONE: NORMAL
OPI OPIATES: NORMAL
OXY OXYCODONE: NORMAL
PCP PHENCYCLIDINE: NORMAL
POC URINE DRUG SCREEN OCDRS: NEGATIVE
THC: NORMAL

## 2020-12-31 PROCEDURE — 94375 RESPIRATORY FLOW VOLUME LOOP: CPT | Performed by: PREVENTIVE MEDICINE

## 2020-12-31 PROCEDURE — 86480 TB TEST CELL IMMUN MEASURE: CPT | Performed by: PREVENTIVE MEDICINE

## 2020-12-31 PROCEDURE — 80305 DRUG TEST PRSMV DIR OPT OBS: CPT | Performed by: PREVENTIVE MEDICINE

## 2020-12-31 PROCEDURE — 86787 VARICELLA-ZOSTER ANTIBODY: CPT | Performed by: PREVENTIVE MEDICINE

## 2020-12-31 PROCEDURE — 8915 PR COMPREHENSIVE PHYSICAL: Performed by: PREVENTIVE MEDICINE

## 2021-01-01 LAB
GAMMA INTERFERON BACKGROUND BLD IA-ACNC: 0.03 IU/ML
M TB IFN-G BLD-IMP: NEGATIVE
M TB IFN-G CD4+ BCKGRND COR BLD-ACNC: 0 IU/ML
MITOGEN IGNF BCKGRD COR BLD-ACNC: >10 IU/ML
QFT TB2 - NIL TBQ2: 0 IU/ML
VZV IGG SER IA-ACNC: 1.12

## 2021-01-22 ENCOUNTER — EH NON-PROVIDER (OUTPATIENT)
Dept: OCCUPATIONAL MEDICINE | Facility: CLINIC | Age: 26
End: 2021-01-22

## 2021-01-22 DIAGNOSIS — Z71.85 IMMUNIZATION COUNSELING: ICD-10-CM

## 2021-01-23 ENCOUNTER — IMMUNIZATION (OUTPATIENT)
Dept: FAMILY PLANNING/WOMEN'S HEALTH CLINIC | Facility: IMMUNIZATION CENTER | Age: 26
End: 2021-01-23
Attending: FAMILY MEDICINE
Payer: COMMERCIAL

## 2021-01-23 DIAGNOSIS — Z23 ENCOUNTER FOR VACCINATION: Primary | ICD-10-CM

## 2021-01-23 DIAGNOSIS — Z23 NEED FOR VACCINATION: ICD-10-CM

## 2021-01-23 PROCEDURE — 0001A PFIZER SARS-COV-2 VACCINE: CPT

## 2021-01-23 PROCEDURE — 91300 PFIZER SARS-COV-2 VACCINE: CPT

## 2021-02-13 ENCOUNTER — IMMUNIZATION (OUTPATIENT)
Dept: FAMILY PLANNING/WOMEN'S HEALTH CLINIC | Facility: IMMUNIZATION CENTER | Age: 26
End: 2021-02-13
Attending: INTERNAL MEDICINE
Payer: COMMERCIAL

## 2021-02-13 DIAGNOSIS — Z23 ENCOUNTER FOR VACCINATION: Primary | ICD-10-CM

## 2021-02-13 PROCEDURE — 91300 PFIZER SARS-COV-2 VACCINE: CPT

## 2021-02-13 PROCEDURE — 0002A PFIZER SARS-COV-2 VACCINE: CPT

## 2021-09-11 ENCOUNTER — HOSPITAL ENCOUNTER (OUTPATIENT)
Facility: MEDICAL CENTER | Age: 26
End: 2021-09-11
Attending: FAMILY MEDICINE
Payer: COMMERCIAL

## 2021-09-11 ENCOUNTER — OFFICE VISIT (OUTPATIENT)
Dept: URGENT CARE | Facility: CLINIC | Age: 26
End: 2021-09-11
Payer: COMMERCIAL

## 2021-09-11 VITALS
TEMPERATURE: 97.4 F | OXYGEN SATURATION: 99 % | BODY MASS INDEX: 33.02 KG/M2 | WEIGHT: 163.8 LBS | HEIGHT: 59 IN | HEART RATE: 79 BPM | SYSTOLIC BLOOD PRESSURE: 110 MMHG | DIASTOLIC BLOOD PRESSURE: 76 MMHG | RESPIRATION RATE: 16 BRPM

## 2021-09-11 DIAGNOSIS — R05.9 COUGH: ICD-10-CM

## 2021-09-11 DIAGNOSIS — B34.9 VIRAL ILLNESS: ICD-10-CM

## 2021-09-11 PROCEDURE — 0240U HCHG SARS-COV-2 COVID-19 NFCT DS RESP RNA 3 TRGT MIC: CPT

## 2021-09-11 PROCEDURE — 99203 OFFICE O/P NEW LOW 30 MIN: CPT | Performed by: FAMILY MEDICINE

## 2021-09-11 RX ORDER — CODEINE PHOSPHATE AND GUAIFENESIN 10; 100 MG/5ML; MG/5ML
5 SOLUTION ORAL EVERY 6 HOURS PRN
Qty: 118 ML | Refills: 0 | Status: SHIPPED | OUTPATIENT
Start: 2021-09-11 | End: 2021-09-11

## 2021-09-11 RX ORDER — CODEINE PHOSPHATE AND GUAIFENESIN 10; 100 MG/5ML; MG/5ML
5 SOLUTION ORAL EVERY 6 HOURS PRN
Qty: 118 ML | Refills: 0 | Status: SHIPPED | OUTPATIENT
Start: 2021-09-11 | End: 2021-09-16

## 2021-09-12 DIAGNOSIS — R05.9 COUGH: ICD-10-CM

## 2021-09-12 NOTE — PROGRESS NOTES
"Subjective     Mary Jane Prater is a 26 y.o. female who presents with Cough (Rm 25- chills, runny nose x 4 days )  She is vaccinated.  She works as a  for [employer].  Review of system otherwise negative.  She denies any wheezing or shortness of breath.  No fever reported.  She needs something for cough she can sleep at night.  She has tried OTC medication with no help.          HPI    ROS           Objective     /76   Pulse 79   Temp 36.3 °C (97.4 °F) (Temporal)   Resp 16   Ht 1.499 m (4' 11\")   Wt 74.3 kg (163 lb 12.8 oz)   SpO2 99%   BMI 33.08 kg/m²      Physical Exam  Constitutional:       General: She is not in acute distress.     Appearance: She is not ill-appearing, toxic-appearing or diaphoretic.   HENT:      Head: Atraumatic.      Right Ear: Tympanic membrane, ear canal and external ear normal.      Left Ear: Ear canal and external ear normal.      Nose: No rhinorrhea.      Mouth/Throat:      Mouth: Mucous membranes are moist.      Pharynx: Oropharynx is clear. No oropharyngeal exudate or posterior oropharyngeal erythema.   Eyes:      Conjunctiva/sclera: Conjunctivae normal.   Cardiovascular:      Rate and Rhythm: Normal rate and regular rhythm.      Heart sounds: No murmur heard.   No friction rub. No gallop.    Pulmonary:      Effort: Pulmonary effort is normal. No respiratory distress.      Breath sounds: No stridor. No wheezing, rhonchi or rales.   Musculoskeletal:      Cervical back: Neck supple.   Lymphadenopathy:      Cervical: No cervical adenopathy.   Skin:     Coloration: Skin is not jaundiced or pale.   Neurological:      Mental Status: She is alert and oriented to person, place, and time.   Psychiatric:         Behavior: Behavior normal.                             Assessment & Plan        ASSESSMENT:PLAN:  1. Viral illness    2. Cough  - CoV-2 and Flu A/B by PCR (24 hour In-House): Collect NP swab in VTM; Future  - guaifenesin-codeine (ROBITUSSIN AC) Solution oral " solution; Take 5 mL by mouth every 6 hours as needed for Cough for up to 5 days.  Dispense: 118 mL; Refill: 0      Continue symptomatic care  Plan per orders and instructions  Warning signs reviewed  Follow up if not significantly improved as expected, sooner if any worsening or new symptoms

## 2021-09-13 ENCOUNTER — TELEPHONE (OUTPATIENT)
Dept: INTENSIVE CARE | Facility: MEDICAL CENTER | Age: 26
End: 2021-09-13

## 2021-09-13 LAB
FLUAV RNA SPEC QL NAA+PROBE: NEGATIVE
FLUBV RNA SPEC QL NAA+PROBE: NEGATIVE
SARS-COV-2 RNA RESP QL NAA+PROBE: NOTDETECTED
SPECIMEN SOURCE: NORMAL

## 2021-09-13 NOTE — TELEPHONE ENCOUNTER
Phone Number Called: [phone] (home)       Call outcome: Spoke to patient regarding message below.    Message: Patient left message on [employer] hotline phone about symptoms, returned called. Patient had already been tested which results had come back negative. Informed patient of results and cleared patient from Covid standpoint.

## 2021-09-15 ENCOUNTER — OFFICE VISIT (OUTPATIENT)
Dept: URGENT CARE | Facility: PHYSICIAN GROUP | Age: 26
End: 2021-09-15
Payer: COMMERCIAL

## 2021-09-15 VITALS
RESPIRATION RATE: 16 BRPM | DIASTOLIC BLOOD PRESSURE: 70 MMHG | HEART RATE: 63 BPM | SYSTOLIC BLOOD PRESSURE: 110 MMHG | HEIGHT: 59 IN | OXYGEN SATURATION: 96 % | TEMPERATURE: 98 F | BODY MASS INDEX: 32.25 KG/M2 | WEIGHT: 160 LBS

## 2021-09-15 DIAGNOSIS — R05.9 COUGH: ICD-10-CM

## 2021-09-15 PROCEDURE — 99213 OFFICE O/P EST LOW 20 MIN: CPT | Performed by: PHYSICIAN ASSISTANT

## 2021-09-15 RX ORDER — BENZONATATE 100 MG/1
100 CAPSULE ORAL 3 TIMES DAILY PRN
Qty: 21 CAPSULE | Refills: 0 | Status: SHIPPED | OUTPATIENT
Start: 2021-09-15 | End: 2022-06-09

## 2021-09-15 RX ORDER — DEXTROMETHORPHAN HYDROBROMIDE AND PROMETHAZINE HYDROCHLORIDE 15; 6.25 MG/5ML; MG/5ML
5 SYRUP ORAL EVERY 4 HOURS PRN
Qty: 120 ML | Refills: 0 | Status: SHIPPED | OUTPATIENT
Start: 2021-09-15 | End: 2022-06-09

## 2021-09-15 NOTE — PROGRESS NOTES
"Subjective:   Mary Jane Prater is a 26 y.o. female who presents for Cough (dry, 1+ week, not improving from medication)    HPI  Patient is a 26-year-old female presents the clinic with complaints of 1 week of a cough.  She was seen several days ago here in the urgent care and had negative Covid and negative influenza.  She is coughing mostly at night and having trouble sleeping due to the cough.  The guaifenesin-codeine mildly help.   Denies any fever, chest pain, SOB, abdominal pain, vomiting, diarrhea. No history of asthma.         Medications:    • guaifenesin-codeine Soln    Allergies: Patient has no known allergies.    Problem List: Mary Jane Prater does not have any pertinent problems on file.    Surgical History:  Past Surgical History:   Procedure Laterality Date   • WILBUR BY LAPAROSCOPY  5/24/2018    Procedure: WILBUR BY LAPAROSCOPY;  Surgeon: Kwesi Clay M.D.;  Location: SURGERY [facility];  Service: General       Past Social Hx: Mary Jane Prater  reports that she has never smoked. She has never used smokeless tobacco. She reports that she does not drink alcohol and does not use drugs.     Past Family Hx:  Mary Jane Prater family history is not on file.     Problem list, medications, and allergies reviewed by myself today in Epic.     Objective:     /70 (BP Location: Right arm, Patient Position: Sitting, BP Cuff Size: Adult)   Pulse 63   Temp 36.7 °C (98 °F) (Temporal)   Resp 16   Ht 1.499 m (4' 11\")   Wt 72.6 kg (160 lb)   SpO2 96%   BMI 32.32 kg/m²     Physical Exam  Vitals reviewed.   Constitutional:       General: She is not in acute distress.     Appearance: Normal appearance. She is not ill-appearing or toxic-appearing.   HENT:      Head: Normocephalic.      Mouth/Throat:      Mouth: Mucous membranes are moist.      Pharynx: Oropharynx is clear. No oropharyngeal exudate or posterior oropharyngeal erythema.   Eyes:      Conjunctiva/sclera: Conjunctivae " normal.      Pupils: Pupils are equal, round, and reactive to light.   Cardiovascular:      Rate and Rhythm: Normal rate and regular rhythm.      Heart sounds: Normal heart sounds.   Pulmonary:      Effort: Pulmonary effort is normal. No respiratory distress.      Breath sounds: Normal breath sounds. No wheezing, rhonchi or rales.   Musculoskeletal:      Cervical back: Neck supple.   Lymphadenopathy:      Cervical: No cervical adenopathy.   Skin:     General: Skin is warm and dry.   Neurological:      General: No focal deficit present.      Mental Status: She is alert and oriented to person, place, and time.   Psychiatric:         Mood and Affect: Mood normal.         Behavior: Behavior normal.         Diagnosis and associated orders:     1. Cough  promethazine-dextromethorphan (PROMETHAZINE-DM) 6.25-15 MG/5ML syrup    benzonatate (TESSALON) 100 MG Cap        Comments/MDM:     Symptomatic and supportive care:   Plenty of oral hydration and rest   Tylenol or ibuprofen for pain and fever as directed.   Warm salt water gargles for sore throat, soft foods, cool liquids.   Saline nasal spray and Flonase as a decongestant.   Overall, the patient is well-appearing. They are not hypoxic, afebrile, and a normal pulmonary exam.     I personally reviewed prior external notes and test results pertinent to today's visit.  Supportive care, natural history, differential diagnoses, and indications for immediate follow-up discussed. Patient expresses understanding and agrees to plan. Patient denies any other questions or concerns.   Regular movement  Follow-up with the primary care physician for recheck, reevaluation, and consideration of further management.    Please note that this dictation was created using voice recognition software. I have made a reasonable attempt to correct obvious errors, but I expect that there are errors of grammar and possibly content that I did not discover before finalizing the note.    This note was  electronically signed by Servando Jorge PA-C

## 2022-04-27 ENCOUNTER — HOSPITAL ENCOUNTER (OUTPATIENT)
Facility: MEDICAL CENTER | Age: 27
End: 2022-04-27
Payer: COMMERCIAL

## 2022-04-27 LAB — COVID ORDER STATUS COVID19: NORMAL

## 2022-04-28 LAB
SARS-COV-2 RNA RESP QL NAA+PROBE: NOTDETECTED
SPECIMEN SOURCE: NORMAL

## 2022-06-09 ENCOUNTER — OFFICE VISIT (OUTPATIENT)
Dept: MEDICAL GROUP | Facility: PHYSICIAN GROUP | Age: 27
End: 2022-06-09
Payer: COMMERCIAL

## 2022-06-09 VITALS
TEMPERATURE: 97.7 F | SYSTOLIC BLOOD PRESSURE: 108 MMHG | HEIGHT: 59 IN | OXYGEN SATURATION: 95 % | WEIGHT: 166 LBS | RESPIRATION RATE: 17 BRPM | DIASTOLIC BLOOD PRESSURE: 70 MMHG | BODY MASS INDEX: 33.47 KG/M2 | HEART RATE: 63 BPM

## 2022-06-09 DIAGNOSIS — Z01.419 WELL WOMAN EXAM: ICD-10-CM

## 2022-06-09 DIAGNOSIS — E66.3 OVERWEIGHT: ICD-10-CM

## 2022-06-09 DIAGNOSIS — Z11.3 ROUTINE SCREENING FOR STI (SEXUALLY TRANSMITTED INFECTION): ICD-10-CM

## 2022-06-09 DIAGNOSIS — Z00.00 WELL ADULT EXAM: ICD-10-CM

## 2022-06-09 DIAGNOSIS — Z76.89 ENCOUNTER TO ESTABLISH CARE WITH NEW DOCTOR: ICD-10-CM

## 2022-06-09 PROCEDURE — 99204 OFFICE O/P NEW MOD 45 MIN: CPT | Performed by: INTERNAL MEDICINE

## 2022-06-09 SDOH — ECONOMIC STABILITY: INCOME INSECURITY: IN THE LAST 12 MONTHS, WAS THERE A TIME WHEN YOU WERE NOT ABLE TO PAY THE MORTGAGE OR RENT ON TIME?: PATIENT REFUSED

## 2022-06-09 SDOH — ECONOMIC STABILITY: TRANSPORTATION INSECURITY
IN THE PAST 12 MONTHS, HAS LACK OF RELIABLE TRANSPORTATION KEPT YOU FROM MEDICAL APPOINTMENTS, MEETINGS, WORK OR FROM GETTING THINGS NEEDED FOR DAILY LIVING?: PATIENT DECLINED

## 2022-06-09 SDOH — ECONOMIC STABILITY: TRANSPORTATION INSECURITY
IN THE PAST 12 MONTHS, HAS LACK OF TRANSPORTATION KEPT YOU FROM MEETINGS, WORK, OR FROM GETTING THINGS NEEDED FOR DAILY LIVING?: PATIENT DECLINED

## 2022-06-09 SDOH — ECONOMIC STABILITY: HOUSING INSECURITY
IN THE LAST 12 MONTHS, WAS THERE A TIME WHEN YOU DID NOT HAVE A STEADY PLACE TO SLEEP OR SLEPT IN A SHELTER (INCLUDING NOW)?: PATIENT REFUSED

## 2022-06-09 SDOH — HEALTH STABILITY: PHYSICAL HEALTH

## 2022-06-09 SDOH — ECONOMIC STABILITY: FOOD INSECURITY: WITHIN THE PAST 12 MONTHS, YOU WORRIED THAT YOUR FOOD WOULD RUN OUT BEFORE YOU GOT MONEY TO BUY MORE.: PATIENT DECLINED

## 2022-06-09 SDOH — ECONOMIC STABILITY: TRANSPORTATION INSECURITY
IN THE PAST 12 MONTHS, HAS THE LACK OF TRANSPORTATION KEPT YOU FROM MEDICAL APPOINTMENTS OR FROM GETTING MEDICATIONS?: PATIENT DECLINED

## 2022-06-09 SDOH — ECONOMIC STABILITY: FOOD INSECURITY: WITHIN THE PAST 12 MONTHS, THE FOOD YOU BOUGHT JUST DIDN'T LAST AND YOU DIDN'T HAVE MONEY TO GET MORE.: PATIENT DECLINED

## 2022-06-09 SDOH — ECONOMIC STABILITY: INCOME INSECURITY: HOW HARD IS IT FOR YOU TO PAY FOR THE VERY BASICS LIKE FOOD, HOUSING, MEDICAL CARE, AND HEATING?: PATIENT DECLINED

## 2022-06-09 SDOH — HEALTH STABILITY: MENTAL HEALTH

## 2022-06-09 SDOH — ECONOMIC STABILITY: HOUSING INSECURITY

## 2022-06-09 ASSESSMENT — SOCIAL DETERMINANTS OF HEALTH (SDOH)
HOW OFTEN DO YOU ATTEND CHURCH OR RELIGIOUS SERVICES?: PATIENT DECLINED
HOW MANY DRINKS CONTAINING ALCOHOL DO YOU HAVE ON A TYPICAL DAY WHEN YOU ARE DRINKING: PATIENT DECLINED
DO YOU BELONG TO ANY CLUBS OR ORGANIZATIONS SUCH AS CHURCH GROUPS UNIONS, FRATERNAL OR ATHLETIC GROUPS, OR SCHOOL GROUPS?: PATIENT DECLINED
IN A TYPICAL WEEK, HOW MANY TIMES DO YOU TALK ON THE PHONE WITH FAMILY, FRIENDS, OR NEIGHBORS?: PATIENT DECLINED
ARE YOU MARRIED, WIDOWED, DIVORCED, SEPARATED, NEVER MARRIED, OR LIVING WITH A PARTNER?: PATIENT DECLINED
HOW OFTEN DO YOU ATTENT MEETINGS OF THE CLUB OR ORGANIZATION YOU BELONG TO?: PATIENT DECLINED
HOW HARD IS IT FOR YOU TO PAY FOR THE VERY BASICS LIKE FOOD, HOUSING, MEDICAL CARE, AND HEATING?: PATIENT DECLINED
HOW OFTEN DO YOU GET TOGETHER WITH FRIENDS OR RELATIVES?: PATIENT DECLINED
HOW OFTEN DO YOU HAVE A DRINK CONTAINING ALCOHOL: PATIENT DECLINED
DO YOU BELONG TO ANY CLUBS OR ORGANIZATIONS SUCH AS CHURCH GROUPS UNIONS, FRATERNAL OR ATHLETIC GROUPS, OR SCHOOL GROUPS?: PATIENT DECLINED
HOW OFTEN DO YOU GET TOGETHER WITH FRIENDS OR RELATIVES?: PATIENT DECLINED
HOW OFTEN DO YOU HAVE SIX OR MORE DRINKS ON ONE OCCASION: PATIENT DECLINED
HOW OFTEN DO YOU ATTEND CHURCH OR RELIGIOUS SERVICES?: PATIENT DECLINED
ARE YOU MARRIED, WIDOWED, DIVORCED, SEPARATED, NEVER MARRIED, OR LIVING WITH A PARTNER?: PATIENT DECLINED
HOW OFTEN DO YOU ATTENT MEETINGS OF THE CLUB OR ORGANIZATION YOU BELONG TO?: PATIENT DECLINED
WITHIN THE PAST 12 MONTHS, YOU WORRIED THAT YOUR FOOD WOULD RUN OUT BEFORE YOU GOT THE MONEY TO BUY MORE: PATIENT DECLINED
IN A TYPICAL WEEK, HOW MANY TIMES DO YOU TALK ON THE PHONE WITH FAMILY, FRIENDS, OR NEIGHBORS?: PATIENT DECLINED

## 2022-06-09 ASSESSMENT — LIFESTYLE VARIABLES
HOW OFTEN DO YOU HAVE SIX OR MORE DRINKS ON ONE OCCASION: PATIENT DECLINED
HOW MANY STANDARD DRINKS CONTAINING ALCOHOL DO YOU HAVE ON A TYPICAL DAY: PATIENT DECLINED
AUDIT-C TOTAL SCORE: -1
SKIP TO QUESTIONS 9-10: 0
HOW OFTEN DO YOU HAVE A DRINK CONTAINING ALCOHOL: PATIENT DECLINED

## 2022-06-09 ASSESSMENT — PATIENT HEALTH QUESTIONNAIRE - PHQ9: CLINICAL INTERPRETATION OF PHQ2 SCORE: 0

## 2022-06-09 NOTE — ASSESSMENT & PLAN NOTE
"Patient present today to establish care with new primary care provider.  Patient requests lab test as well as STD panel testing.  The patient is asymptomatic.    Past medical history unremarkable    Past surgical history status postcholecystectomy.    Medications none    Social history the patient is single.  The patient does not smoke or drink alcohol.  Denies other drug use    Review of systems significant for recurrent \"gassy/bloating symptoms    Family history Father with diabetes    Mother with prediabetes    "

## 2022-06-09 NOTE — PROGRESS NOTES
"PRIMARY CARE CLINIC VISIT  Chief Complaint   Patient presents with   • New Patient         History of Present Illness           Encounter to establish care with new doctor  Patient present today to establish care with new primary care provider.  Patient requests lab test as well as STD panel testing.  The patient is asymptomatic.    Past medical history unremarkable    Past surgical history status postcholecystectomy.    Medications none    Social history the patient is single.  The patient does not smoke or drink alcohol.  Denies other drug use    Review of systems significant for recurrent \"gassy/bloating symptoms    Family history Father with diabetes    Mother with prediabetes    Overweight  Body mass index is 33.53 kg/m².   Brief discussion with the patient regarding diet, exercise, and lifestyle modification to help achieve and maintain healthy weight            No current outpatient medications on file prior to visit.     No current facility-administered medications on file prior to visit.        Allergies: Patient has no known allergies.    ROS  As per HPI above. All other systems reviewed and negative.      Past Medical, Social, and Family history reviewed and updated in EPIC     Objective     /70 (BP Location: Left arm, Patient Position: Sitting, BP Cuff Size: Adult)   Pulse 63   Temp 36.5 °C (97.7 °F) (Temporal)   Resp 17   Ht 1.499 m (4' 11\")   Wt 75.3 kg (166 lb)   SpO2 95%    Body mass index is 33.53 kg/m².    General: alert and oriented  Cardiovascular: regular rate and rhythm  Pulmonary: lungs : no wheezing   Gastrointestinal: BS present. No obvious mass noted  Neuro nonfocal cranial nerves II to XII grossly intact  Nose with mild mucosal formation no purulent drainage  Oropharynx no exudates      Assessment and Plan     1. Encounter to establish care with new doctor    2. Overweight  Recommended patient healthy diet and exercise.  The patient to lose weight.  3. Well woman exam  - " Referral to Gynecology    4. Well adult exam  Routine lab work ordered today.  Advised the patient to follow-up after a couple of days.  - HEMOGLOBIN A1C; Future  - ALANINE AMINO-TRANS; Future  - Basic Metabolic Panel; Future  - CBC WITHOUT DIFFERENTIAL; Future  - Lipid Profile; Future  - TSH; Future  - VITAMIN D,25 HYDROXY; Future  - CELIAC DISEASE AB PANEL; Future  - H.PYLORI STOOL ANTIGEN; Future    5. Routine screening for STI (sexually transmitted infection) patient currently asymptomatic.  Lab tests ordered as requested.  Advised the patient to follow-up after test done.  Safe sex practices discussed with the patient.  - Chlamydia/GC, PCR (Urine); Future  - HEP C VIRUS ANTIBODY; Future  - HIV AG/AB COMBO ASSAY SCREENING; Future  - HSV 1/2 IGM; Future  - T.PALLIDUM AB EIA; Future                      Healthcare Maintenance     Health Maintenance Due   Topic Date Due   • PAP SMEAR  Never done   • COVID-19 Vaccine (3 - Booster for Pfizer series) 07/13/2021     Recommend patient to go to local pharmacy for COVID-19 booster vaccine.           Please note that this dictation was created using voice recognition software. I have made every reasonable attempt to correct obvious errors but there may be errors of grammar and content that I may have overlooked prior to finalization of this note.      Guy Almaraz MD  Internal Medicine  [facility]

## 2022-06-09 NOTE — ASSESSMENT & PLAN NOTE
Body mass index is 33.53 kg/m².   Brief discussion with the patient regarding diet, exercise, and lifestyle modification to help achieve and maintain healthy weight

## 2022-09-20 ENCOUNTER — OFFICE VISIT (OUTPATIENT)
Dept: URGENT CARE | Facility: CLINIC | Age: 27
End: 2022-09-20
Payer: COMMERCIAL

## 2022-09-20 ENCOUNTER — HOSPITAL ENCOUNTER (OUTPATIENT)
Facility: MEDICAL CENTER | Age: 27
End: 2022-09-20
Attending: NURSE PRACTITIONER
Payer: COMMERCIAL

## 2022-09-20 VITALS
DIASTOLIC BLOOD PRESSURE: 82 MMHG | WEIGHT: 169 LBS | HEART RATE: 72 BPM | TEMPERATURE: 97.4 F | OXYGEN SATURATION: 98 % | BODY MASS INDEX: 35.48 KG/M2 | HEIGHT: 58 IN | SYSTOLIC BLOOD PRESSURE: 110 MMHG | RESPIRATION RATE: 14 BRPM

## 2022-09-20 DIAGNOSIS — N30.01 ACUTE CYSTITIS WITH HEMATURIA: ICD-10-CM

## 2022-09-20 LAB
APPEARANCE UR: NORMAL
BILIRUB UR STRIP-MCNC: NEGATIVE MG/DL
COLOR UR AUTO: NORMAL
GLUCOSE UR STRIP.AUTO-MCNC: NEGATIVE MG/DL
INT CON NEG: NORMAL
INT CON POS: NORMAL
KETONES UR STRIP.AUTO-MCNC: NEGATIVE MG/DL
LEUKOCYTE ESTERASE UR QL STRIP.AUTO: NORMAL
NITRITE UR QL STRIP.AUTO: NEGATIVE
PH UR STRIP.AUTO: 6 [PH] (ref 5–8)
POC URINE PREGNANCY TEST: NEGATIVE
PROT UR QL STRIP: NEGATIVE MG/DL
RBC UR QL AUTO: NORMAL
SP GR UR STRIP.AUTO: 1.01
UROBILINOGEN UR STRIP-MCNC: 0.2 MG/DL

## 2022-09-20 PROCEDURE — 81002 URINALYSIS NONAUTO W/O SCOPE: CPT | Performed by: NURSE PRACTITIONER

## 2022-09-20 PROCEDURE — 87186 SC STD MICRODIL/AGAR DIL: CPT

## 2022-09-20 PROCEDURE — 99213 OFFICE O/P EST LOW 20 MIN: CPT | Performed by: NURSE PRACTITIONER

## 2022-09-20 PROCEDURE — 87086 URINE CULTURE/COLONY COUNT: CPT

## 2022-09-20 PROCEDURE — 81025 URINE PREGNANCY TEST: CPT | Performed by: NURSE PRACTITIONER

## 2022-09-20 PROCEDURE — 87077 CULTURE AEROBIC IDENTIFY: CPT

## 2022-09-20 RX ORDER — NITROFURANTOIN 25; 75 MG/1; MG/1
100 CAPSULE ORAL 2 TIMES DAILY
Qty: 10 CAPSULE | Refills: 0 | Status: SHIPPED | OUTPATIENT
Start: 2022-09-20 | End: 2022-09-25

## 2022-09-20 ASSESSMENT — ENCOUNTER SYMPTOMS
SORE THROAT: 0
MYALGIAS: 0
EYE REDNESS: 0
FATIGUE: 0
SHORTNESS OF BREATH: 0
CHILLS: 0
NAUSEA: 0
VOMITING: 0
DIZZINESS: 0
FEVER: 0
FLANK PAIN: 0

## 2022-09-21 NOTE — PROGRESS NOTES
Subjective:   Mary Jane Prater is a 27 y.o. female who presents for UTI (X 7 days with discomfort with urination, urgency and frequency.  Denies fever or chills. )      UTI  This is a new problem. The current episode started in the past 7 days. The problem occurs constantly. The problem has been gradually worsening. Associated symptoms include urinary symptoms. Pertinent negatives include no chest pain, chills, fatigue, fever, myalgias, nausea, rash, sore throat or vomiting. Nothing aggravates the symptoms. She has tried drinking for the symptoms. The treatment provided no relief.     Review of Systems   Constitutional:  Negative for chills, fatigue and fever.   HENT:  Negative for sore throat.    Eyes:  Negative for redness.   Respiratory:  Negative for shortness of breath.    Cardiovascular:  Negative for chest pain.   Gastrointestinal:  Negative for nausea and vomiting.   Genitourinary:  Positive for dysuria, frequency and urgency. Negative for flank pain and hematuria.   Musculoskeletal:  Negative for myalgias.   Skin:  Negative for rash.   Neurological:  Negative for dizziness.     Medications:    nitrofurantoin Caps    Allergies: Patient has no known allergies.    Problem List: Mary Jane Prater does not have any pertinent problems on file.    Surgical History:  Past Surgical History:   Procedure Laterality Date    WILBUR BY LAPAROSCOPY  5/24/2018    Procedure: WILBUR BY LAPAROSCOPY;  Surgeon: Kwesi Clay M.D.;  Location: SURGERY [facility];  Service: General       Past Social Hx: Mary Jane Prater  reports that she has never smoked. She has never used smokeless tobacco. She reports current alcohol use. She reports that she does not use drugs.     Past Family Hx:  Mary Jane Prater family history is not on file.     Problem list, medications, and allergies reviewed by myself today in Epic.     Objective:     /82   Pulse 72   Temp 36.3 °C (97.4 °F) (Temporal)   Resp 14   " Ht 1.473 m (4' 10\")   Wt 76.7 kg (169 lb)   LMP 09/06/2022   SpO2 98%   BMI 35.32 kg/m²     Physical Exam  Vitals and nursing note reviewed.   Constitutional:       General: She is not in acute distress.     Appearance: She is well-developed.   HENT:      Head: Normocephalic and atraumatic.      Right Ear: Tympanic membrane and external ear normal.      Left Ear: Tympanic membrane and external ear normal.      Nose: Nose normal.      Right Sinus: No maxillary sinus tenderness or frontal sinus tenderness.      Left Sinus: No maxillary sinus tenderness or frontal sinus tenderness.      Mouth/Throat:      Mouth: Mucous membranes are moist.      Pharynx: Uvula midline. No posterior oropharyngeal erythema.      Tonsils: No tonsillar exudate or tonsillar abscesses.   Eyes:      General:         Right eye: No discharge.         Left eye: No discharge.      Conjunctiva/sclera: Conjunctivae normal.   Cardiovascular:      Rate and Rhythm: Normal rate.   Pulmonary:      Effort: Pulmonary effort is normal. No respiratory distress.      Breath sounds: Normal breath sounds.   Abdominal:      General: Bowel sounds are normal. There is no distension.      Tenderness: There is no abdominal tenderness. There is no right CVA tenderness or left CVA tenderness.   Musculoskeletal:         General: Normal range of motion.   Skin:     General: Skin is warm and dry.   Neurological:      General: No focal deficit present.      Mental Status: She is alert and oriented to person, place, and time. Mental status is at baseline.      Gait: Gait (gait at baseline) normal.   Psychiatric:         Judgment: Judgment normal.       Assessment/Plan:     Diagnosis and associated orders:     1. Acute cystitis with hematuria  nitrofurantoin (MACROBID) 100 MG Cap    POCT Urinalysis    POCT Pregnancy    URINE CULTURE(NEW)         Comments/MDM:     Pt is a 27 yr old female who presents for evaluation of dysuria.  Pt states she has had dysuria, " frequency, and urgency for 7 days.  Denies fevers, flank pain/chills, nausea/vomiting, or vaginal discharge.  Pt is not pregnant, diabetic or immunocompromised.  No use of catheters.  Vital signs normal.  Pt does not appear ill or altered mental status.  There is no PTP of the abdomen or CVA tenderness.  UA is suggestive of bacteriuria.             Differential diagnosis, natural history, supportive care, and indications for immediate follow-up discussed.        Please note that this dictation was created using voice recognition software. I have made a reasonable attempt to correct obvious errors, but I expect that there are errors of grammar and possibly content that I did not discover before finalizing the note.    This note was electronically signed by Lauro MARKHAM.

## 2022-09-23 LAB
BACTERIA UR CULT: ABNORMAL
BACTERIA UR CULT: ABNORMAL
SIGNIFICANT IND 70042: ABNORMAL
SITE SITE: ABNORMAL
SOURCE SOURCE: ABNORMAL

## 2022-09-24 ENCOUNTER — HOSPITAL ENCOUNTER (OUTPATIENT)
Dept: LAB | Facility: MEDICAL CENTER | Age: 27
End: 2022-09-24
Attending: INTERNAL MEDICINE
Payer: COMMERCIAL

## 2022-09-24 DIAGNOSIS — Z11.3 ROUTINE SCREENING FOR STI (SEXUALLY TRANSMITTED INFECTION): ICD-10-CM

## 2022-09-24 DIAGNOSIS — Z00.00 WELL ADULT EXAM: ICD-10-CM

## 2022-09-24 LAB
25(OH)D3 SERPL-MCNC: 37 NG/ML (ref 30–100)
ALT SERPL-CCNC: 20 U/L (ref 2–50)
ANION GAP SERPL CALC-SCNC: 10 MMOL/L (ref 7–16)
BUN SERPL-MCNC: 10 MG/DL (ref 8–22)
CALCIUM SERPL-MCNC: 8.6 MG/DL (ref 8.5–10.5)
CHLORIDE SERPL-SCNC: 105 MMOL/L (ref 96–112)
CHOLEST SERPL-MCNC: 170 MG/DL (ref 100–199)
CO2 SERPL-SCNC: 22 MMOL/L (ref 20–33)
CREAT SERPL-MCNC: 0.65 MG/DL (ref 0.5–1.4)
ERYTHROCYTE [DISTWIDTH] IN BLOOD BY AUTOMATED COUNT: 41.7 FL (ref 35.9–50)
EST. AVERAGE GLUCOSE BLD GHB EST-MCNC: 111 MG/DL
GFR SERPLBLD CREATININE-BSD FMLA CKD-EPI: 123 ML/MIN/1.73 M 2
GLUCOSE SERPL-MCNC: 96 MG/DL (ref 65–99)
HBA1C MFR BLD: 5.5 % (ref 4–5.6)
HCT VFR BLD AUTO: 43.1 % (ref 37–47)
HCV AB SER QL: NORMAL
HDLC SERPL-MCNC: 40 MG/DL
HGB BLD-MCNC: 15 G/DL (ref 12–16)
HIV 1+2 AB+HIV1 P24 AG SERPL QL IA: NORMAL
LDLC SERPL CALC-MCNC: 115 MG/DL
MCH RBC QN AUTO: 32 PG (ref 27–33)
MCHC RBC AUTO-ENTMCNC: 34.8 G/DL (ref 33.6–35)
MCV RBC AUTO: 91.9 FL (ref 81.4–97.8)
PLATELET # BLD AUTO: 336 K/UL (ref 164–446)
PMV BLD AUTO: 9.6 FL (ref 9–12.9)
POTASSIUM SERPL-SCNC: 3.9 MMOL/L (ref 3.6–5.5)
RBC # BLD AUTO: 4.69 M/UL (ref 4.2–5.4)
SODIUM SERPL-SCNC: 137 MMOL/L (ref 135–145)
T PALLIDUM AB SER QL IA: NORMAL
TRIGL SERPL-MCNC: 76 MG/DL (ref 0–149)
TSH SERPL DL<=0.005 MIU/L-ACNC: 2.93 UIU/ML (ref 0.38–5.33)
WBC # BLD AUTO: 8.9 K/UL (ref 4.8–10.8)

## 2022-09-24 PROCEDURE — 36415 COLL VENOUS BLD VENIPUNCTURE: CPT

## 2022-09-24 PROCEDURE — 87389 HIV-1 AG W/HIV-1&-2 AB AG IA: CPT

## 2022-09-24 PROCEDURE — 80061 LIPID PANEL: CPT

## 2022-09-24 PROCEDURE — 83036 HEMOGLOBIN GLYCOSYLATED A1C: CPT

## 2022-09-24 PROCEDURE — 87591 N.GONORRHOEAE DNA AMP PROB: CPT

## 2022-09-24 PROCEDURE — 86364 TISS TRNSGLTMNASE EA IG CLAS: CPT

## 2022-09-24 PROCEDURE — 86803 HEPATITIS C AB TEST: CPT

## 2022-09-24 PROCEDURE — 80048 BASIC METABOLIC PNL TOTAL CA: CPT

## 2022-09-24 PROCEDURE — 86780 TREPONEMA PALLIDUM: CPT

## 2022-09-24 PROCEDURE — 87491 CHLMYD TRACH DNA AMP PROBE: CPT

## 2022-09-24 PROCEDURE — 84460 ALANINE AMINO (ALT) (SGPT): CPT

## 2022-09-24 PROCEDURE — 82306 VITAMIN D 25 HYDROXY: CPT

## 2022-09-24 PROCEDURE — 86694 HERPES SIMPLEX NES ANTBDY: CPT

## 2022-09-24 PROCEDURE — 85027 COMPLETE CBC AUTOMATED: CPT

## 2022-09-24 PROCEDURE — 84443 ASSAY THYROID STIM HORMONE: CPT

## 2022-09-24 PROCEDURE — 82784 ASSAY IGA/IGD/IGG/IGM EACH: CPT

## 2022-09-26 LAB — IGA SERPL-MCNC: 273 MG/DL (ref 68–408)

## 2022-09-27 LAB
HSV1+2 IGM SER IA-ACNC: 0.77 IV
TTG IGA SER IA-ACNC: <2 U/ML (ref 0–3)

## 2022-09-29 ENCOUNTER — IMMUNIZATION (OUTPATIENT)
Dept: OCCUPATIONAL MEDICINE | Facility: CLINIC | Age: 27
End: 2022-09-29

## 2022-09-29 DIAGNOSIS — Z23 NEED FOR VACCINATION: Primary | ICD-10-CM

## 2022-09-29 PROCEDURE — 90686 IIV4 VACC NO PRSV 0.5 ML IM: CPT | Performed by: PREVENTIVE MEDICINE

## 2022-10-06 LAB
C TRACH DNA SPEC QL NAA+PROBE: NEGATIVE
N GONORRHOEA DNA SPEC QL NAA+PROBE: NEGATIVE
SPECIMEN SOURCE: NORMAL

## 2022-11-15 ENCOUNTER — HOSPITAL ENCOUNTER (OUTPATIENT)
Facility: MEDICAL CENTER | Age: 27
End: 2022-11-15
Attending: INTERNAL MEDICINE
Payer: COMMERCIAL

## 2022-11-15 ENCOUNTER — OFFICE VISIT (OUTPATIENT)
Dept: MEDICAL GROUP | Facility: PHYSICIAN GROUP | Age: 27
End: 2022-11-15
Payer: COMMERCIAL

## 2022-11-15 ENCOUNTER — HOSPITAL ENCOUNTER (OUTPATIENT)
Dept: LAB | Facility: MEDICAL CENTER | Age: 27
End: 2022-11-15
Attending: INTERNAL MEDICINE
Payer: COMMERCIAL

## 2022-11-15 VITALS
SYSTOLIC BLOOD PRESSURE: 112 MMHG | RESPIRATION RATE: 18 BRPM | WEIGHT: 173 LBS | BODY MASS INDEX: 36.31 KG/M2 | HEART RATE: 69 BPM | DIASTOLIC BLOOD PRESSURE: 68 MMHG | HEIGHT: 58 IN | TEMPERATURE: 97.3 F | OXYGEN SATURATION: 99 %

## 2022-11-15 DIAGNOSIS — N94.10 DYSPAREUNIA, FEMALE: ICD-10-CM

## 2022-11-15 DIAGNOSIS — E66.3 OVERWEIGHT: ICD-10-CM

## 2022-11-15 DIAGNOSIS — H00.014 HORDEOLUM EXTERNUM OF LEFT UPPER EYELID: ICD-10-CM

## 2022-11-15 PROBLEM — B96.89 GARDNERELLA VAGINALIS INFECTION: Status: ACTIVE | Noted: 2022-11-15

## 2022-11-15 PROBLEM — N76.0 GARDNERELLA VAGINALIS INFECTION: Status: ACTIVE | Noted: 2022-11-15

## 2022-11-15 LAB
ANION GAP SERPL CALC-SCNC: 10 MMOL/L (ref 7–16)
APPEARANCE UR: CLEAR
BACTERIA #/AREA URNS HPF: ABNORMAL /HPF
BILIRUB UR QL STRIP.AUTO: NEGATIVE
BUN SERPL-MCNC: 13 MG/DL (ref 8–22)
CALCIUM SERPL-MCNC: 9.5 MG/DL (ref 8.5–10.5)
CANDIDA DNA VAG QL PROBE+SIG AMP: NEGATIVE
CHLORIDE SERPL-SCNC: 100 MMOL/L (ref 96–112)
CO2 SERPL-SCNC: 25 MMOL/L (ref 20–33)
COLOR UR: YELLOW
CREAT SERPL-MCNC: 0.88 MG/DL (ref 0.5–1.4)
EPI CELLS #/AREA URNS HPF: ABNORMAL /HPF
ERYTHROCYTE [DISTWIDTH] IN BLOOD BY AUTOMATED COUNT: 41.1 FL (ref 35.9–50)
G VAGINALIS DNA VAG QL PROBE+SIG AMP: POSITIVE
GFR SERPLBLD CREATININE-BSD FMLA CKD-EPI: 92 ML/MIN/1.73 M 2
GLUCOSE SERPL-MCNC: 95 MG/DL (ref 65–99)
GLUCOSE UR STRIP.AUTO-MCNC: NEGATIVE MG/DL
HBV SURFACE AB SERPL IA-ACNC: 4910 MIU/ML (ref 0–10)
HBV SURFACE AG SER QL: NORMAL
HCT VFR BLD AUTO: 43.7 % (ref 37–47)
HCV AB SER QL: NORMAL
HGB BLD-MCNC: 15 G/DL (ref 12–16)
HIV 1+2 AB+HIV1 P24 AG SERPL QL IA: NORMAL
HYALINE CASTS #/AREA URNS LPF: ABNORMAL /LPF
KETONES UR STRIP.AUTO-MCNC: NEGATIVE MG/DL
LEUKOCYTE ESTERASE UR QL STRIP.AUTO: ABNORMAL
MCH RBC QN AUTO: 31.7 PG (ref 27–33)
MCHC RBC AUTO-ENTMCNC: 34.3 G/DL (ref 33.6–35)
MCV RBC AUTO: 92.4 FL (ref 81.4–97.8)
MICRO URNS: ABNORMAL
NITRITE UR QL STRIP.AUTO: NEGATIVE
PH UR STRIP.AUTO: 6.5 [PH] (ref 5–8)
PLATELET # BLD AUTO: 385 K/UL (ref 164–446)
PMV BLD AUTO: 9.8 FL (ref 9–12.9)
POTASSIUM SERPL-SCNC: 3.6 MMOL/L (ref 3.6–5.5)
PROT UR QL STRIP: NEGATIVE MG/DL
RBC # BLD AUTO: 4.73 M/UL (ref 4.2–5.4)
RBC # URNS HPF: ABNORMAL /HPF
RBC UR QL AUTO: NEGATIVE
SODIUM SERPL-SCNC: 135 MMOL/L (ref 135–145)
SP GR UR STRIP.AUTO: 1.01
T PALLIDUM AB SER QL IA: NORMAL
T VAGINALIS DNA VAG QL PROBE+SIG AMP: NEGATIVE
UROBILINOGEN UR STRIP.AUTO-MCNC: 0.2 MG/DL
WBC # BLD AUTO: 8.7 K/UL (ref 4.8–10.8)
WBC #/AREA URNS HPF: ABNORMAL /HPF

## 2022-11-15 PROCEDURE — 87510 GARDNER VAG DNA DIR PROBE: CPT

## 2022-11-15 PROCEDURE — 80048 BASIC METABOLIC PNL TOTAL CA: CPT

## 2022-11-15 PROCEDURE — 87491 CHLMYD TRACH DNA AMP PROBE: CPT

## 2022-11-15 PROCEDURE — 99214 OFFICE O/P EST MOD 30 MIN: CPT | Performed by: INTERNAL MEDICINE

## 2022-11-15 PROCEDURE — 86780 TREPONEMA PALLIDUM: CPT

## 2022-11-15 PROCEDURE — 87480 CANDIDA DNA DIR PROBE: CPT

## 2022-11-15 PROCEDURE — 87660 TRICHOMONAS VAGIN DIR PROBE: CPT

## 2022-11-15 PROCEDURE — 86803 HEPATITIS C AB TEST: CPT

## 2022-11-15 PROCEDURE — 86706 HEP B SURFACE ANTIBODY: CPT

## 2022-11-15 PROCEDURE — 86694 HERPES SIMPLEX NES ANTBDY: CPT

## 2022-11-15 PROCEDURE — 87086 URINE CULTURE/COLONY COUNT: CPT

## 2022-11-15 PROCEDURE — 81001 URINALYSIS AUTO W/SCOPE: CPT

## 2022-11-15 PROCEDURE — 85027 COMPLETE CBC AUTOMATED: CPT

## 2022-11-15 PROCEDURE — 87340 HEPATITIS B SURFACE AG IA: CPT

## 2022-11-15 PROCEDURE — 87389 HIV-1 AG W/HIV-1&-2 AB AG IA: CPT

## 2022-11-15 PROCEDURE — 87591 N.GONORRHOEAE DNA AMP PROB: CPT

## 2022-11-15 PROCEDURE — 36415 COLL VENOUS BLD VENIPUNCTURE: CPT

## 2022-11-15 RX ORDER — AMOXICILLIN AND CLAVULANATE POTASSIUM 875; 125 MG/1; MG/1
1 TABLET, FILM COATED ORAL 2 TIMES DAILY
Qty: 14 TABLET | Refills: 0 | Status: SHIPPED | OUTPATIENT
Start: 2022-11-15 | End: 2023-01-22

## 2022-11-15 RX ORDER — METRONIDAZOLE 500 MG/1
500 TABLET ORAL 2 TIMES DAILY
Qty: 14 TABLET | Refills: 0 | Status: SHIPPED | OUTPATIENT
Start: 2022-11-15 | End: 2023-01-22

## 2022-11-15 NOTE — PROGRESS NOTES
"PRIMARY CARE CLINIC VISIT    Chief complaint:    Vaginal pain during intercourse  Overweight  Stye left upper eyelid    History of Present Illness     Overweight  Body mass index is 36.16 kg/m². .   Brief discussion with the patient regarding diet, exercise, and lifestyle modification to help achieve and maintain healthy weight         Dyspareunia, female  This is a new condition.  Patient reported painful sensation with sexual intercourse noted since the last several days.  She denies fever or chills.  Patient reported pain with deep pelvic thrusting.  Patient denies significant pain after sex.  Patient denies any history of trauma or injury.  No history of MVA.  Patient reported that her relationship with her partner is good.  Patient reported like vaginal discharge.  No prior history of STD reported.    Hordeolum externum of left upper eyelid  This is a new condition.  The patient reported some pain/discomfort affecting the left upper eyelid since the last few days.  She denies any recent trauma or injury.  Denies any change in her vision    No current outpatient medications on file prior to visit.     No current facility-administered medications on file prior to visit.        Allergies: Patient has no known allergies.    ROS  As per HPI above. All other systems reviewed and negative.      Past Medical, Social, and Family history reviewed and updated in EPIC     Objective     /68 (BP Location: Left arm, Patient Position: Sitting, BP Cuff Size: Adult)   Pulse 69   Temp 36.3 °C (97.3 °F) (Temporal)   Resp 18   Ht 1.473 m (4' 10\")   Wt 78.5 kg (173 lb)   SpO2 99%    Body mass index is 36.16 kg/m².    General: alert in no apparent distress.  Cardiovascular: regular rate and rhythm  Pulmonary: lungs : no wheezing   Gastrointestinal: BS present. No obvious mass noted   /pelvic deferred as the patient wishes to see a female provider    Eye EOMI PERRL conjunctiva clear.  There is a focal erythema/swelling " left upper eyelid consistent with a stye.  Lid eversion no foreign body noted.  No discharge.  Assessment and Plan     1. Dyspareunia, female  This is a new condition noted since last few days.  Exact cause is unclear.  Rule out possible bacterial infection versus vestibulodynia versus pelvic floor dysfunction versus PID versus vulvar fissure versus nerve entrapments versus others  - Referral to Gynecology  - CBC WITHOUT DIFFERENTIAL; Future  - Basic Metabolic Panel; Future  - URINALYSIS; Future  - URINE CULTURE(NEW); Future  - T.PALLIDUM AB JED (SCREENING); Future  - Chlamydia/GC, PCR (Urine); Future  - HIV AG/AB COMBO ASSAY SCREENING; Future  - HSV 1/2 IGM; Future  - HEP B SURFACE AB; Future  - HEP B SURFACE ANTIGEN; Future  - HEP C VIRUS ANTIBODY; Future  - VAGINAL PATHOGENS DNA PANEL; Future  Advised the patient to go to ER or urgent care if symptoms worsen or any change in her condition.    2. Overweight  This is a chronic condition.  Advised the patient regarding healthy diet and exercise.  Patient was referred to medical weight loss program.      3. Hordeolum externum of left upper eyelid  reCommend warm compresses 3 times a day.  - amoxicillin-clavulanate (AUGMENTIN) 875-125 MG Tab; Take 1 Tablet by mouth 2 times a day.  Dispense: 14 Tablet; Refill: 0  Recommend follow-up in 7 to 10 days.  Advised the patient to go to ER or urgent care if symptoms worsen or any change in her condition.                  Please note that this dictation was created using voice recognition software. I have made every reasonable attempt to correct obvious errors, but I expect that there are errors of grammar and possibly content that I did not discover before finalizing the note.    Guy Almaraz MD  Internal Medicine  [facility]

## 2022-11-15 NOTE — ASSESSMENT & PLAN NOTE
This is a new condition.  Patient reported painful sensation with sexual intercourse noted since the last several days.  She denies fever or chills.  Patient reported pain with deep pelvic thrusting.  Patient denies significant pain after sex.  Patient denies any history of trauma or injury.  No history of MVA.  Patient reported that her relationship with her partner is good.  Patient reported like vaginal discharge.  No prior history of STD reported.

## 2022-11-15 NOTE — ASSESSMENT & PLAN NOTE
This is a new condition.  The patient reported some pain/discomfort affecting the left upper eyelid since the last few days.  She denies any recent trauma or injury.  Denies any change in her vision

## 2022-11-15 NOTE — ASSESSMENT & PLAN NOTE
Body mass index is 36.16 kg/m². .   Brief discussion with the patient regarding diet, exercise, and lifestyle modification to help achieve and maintain healthy weight

## 2022-11-17 LAB
BACTERIA UR CULT: NORMAL
SIGNIFICANT IND 70042: NORMAL
SITE SITE: NORMAL
SOURCE SOURCE: NORMAL

## 2022-11-23 DIAGNOSIS — B00.9 HERPES: ICD-10-CM

## 2022-11-23 LAB — HSV1+2 IGM SER IA-ACNC: 0.99 IV

## 2022-11-23 RX ORDER — VALACYCLOVIR HYDROCHLORIDE 1 G/1
1000 TABLET, FILM COATED ORAL 2 TIMES DAILY
Qty: 14 TABLET | Refills: 0 | Status: SHIPPED | OUTPATIENT
Start: 2022-11-23 | End: 2023-05-03

## 2023-01-22 ENCOUNTER — OFFICE VISIT (OUTPATIENT)
Dept: URGENT CARE | Facility: PHYSICIAN GROUP | Age: 28
End: 2023-01-22
Payer: COMMERCIAL

## 2023-01-22 VITALS
BODY MASS INDEX: 34.88 KG/M2 | OXYGEN SATURATION: 97 % | RESPIRATION RATE: 16 BRPM | HEIGHT: 59 IN | TEMPERATURE: 97.6 F | DIASTOLIC BLOOD PRESSURE: 70 MMHG | HEART RATE: 74 BPM | WEIGHT: 173 LBS | SYSTOLIC BLOOD PRESSURE: 108 MMHG

## 2023-01-22 DIAGNOSIS — H10.522 ANGULAR BLEPHAROCONJUNCTIVITIS OF LEFT EYE: ICD-10-CM

## 2023-01-22 PROCEDURE — 99213 OFFICE O/P EST LOW 20 MIN: CPT | Performed by: PHYSICIAN ASSISTANT

## 2023-01-22 RX ORDER — PREDNISONE 10 MG/1
40 TABLET ORAL DAILY
Qty: 20 TABLET | Refills: 0 | Status: SHIPPED | OUTPATIENT
Start: 2023-01-22 | End: 2023-01-27

## 2023-01-22 RX ORDER — MOXIFLOXACIN 5 MG/ML
1 SOLUTION/ DROPS OPHTHALMIC 3 TIMES DAILY
Qty: 2 ML | Refills: 0 | Status: SHIPPED | OUTPATIENT
Start: 2023-01-22 | End: 2023-01-29

## 2023-01-22 ASSESSMENT — ENCOUNTER SYMPTOMS
EYE DISCHARGE: 1
PHOTOPHOBIA: 0
DOUBLE VISION: 0
HEADACHES: 0
DIARRHEA: 0
EYE REDNESS: 1
EYE PAIN: 0
BLURRED VISION: 0
FEVER: 0
MYALGIAS: 0
CHILLS: 0
SHORTNESS OF BREATH: 0
NAUSEA: 0
ABDOMINAL PAIN: 0
CONSTIPATION: 0
VOMITING: 0
COUGH: 0
SORE THROAT: 0

## 2023-01-22 NOTE — PROGRESS NOTES
"Subjective:   Mary Jane Prater is a 27 y.o. female who presents for Conjunctivitis (Left eye , crusty and drainage started this morning )      27-year-old female presents to urgent care for left eye discharge itching and irritation.  She has a history of fairly chronic blepharitis, takes Pataday antihistamine drops daily.  She has noticed worsening pain and discomfort following recent Botox to treat for ptosis that she received around 2 weeks ago.  Does wear contact lenses, has not been using on the last 48 hours.  Started taking an oral antihistamine yesterday which seemed to help.  She denies any changes to visual acuities.    Review of Systems   Constitutional:  Negative for chills and fever.   HENT:  Negative for congestion, ear pain and sore throat.    Eyes:  Positive for discharge and redness. Negative for blurred vision, double vision, photophobia and pain.   Respiratory:  Negative for cough and shortness of breath.    Cardiovascular:  Negative for chest pain.   Gastrointestinal:  Negative for abdominal pain, constipation, diarrhea, nausea and vomiting.   Genitourinary:  Negative for dysuria.   Musculoskeletal:  Negative for myalgias.   Skin:  Negative for rash.   Neurological:  Negative for headaches.     Medications, Allergies, and current problem list reviewed today in Epic.     Objective:     /70 (BP Location: Right arm, Patient Position: Sitting, BP Cuff Size: Adult)   Pulse 74   Temp 36.4 °C (97.6 °F) (Temporal)   Resp 16   Ht 1.499 m (4' 11\")   Wt 78.5 kg (173 lb)   SpO2 97%     Physical Exam  Vitals reviewed.   Constitutional:       Appearance: Normal appearance.   HENT:      Head: Normocephalic and atraumatic.      Right Ear: Tympanic membrane, ear canal and external ear normal.      Left Ear: Tympanic membrane, ear canal and external ear normal.      Nose: Nose normal.      Mouth/Throat:      Mouth: Mucous membranes are moist.   Eyes:      Extraocular Movements: Extraocular " movements intact.      Conjunctiva/sclera: Conjunctivae normal.      Pupils: Pupils are equal, round, and reactive to light.      Comments: Trace edema without stye or hordeolum left upper eyelid, mild erythema no crepitance   Cardiovascular:      Rate and Rhythm: Normal rate.   Pulmonary:      Effort: Pulmonary effort is normal.   Skin:     General: Skin is warm and dry.      Capillary Refill: Capillary refill takes less than 2 seconds.   Neurological:      Mental Status: She is alert and oriented to person, place, and time.       Assessment/Plan:     Diagnosis and associated orders:     1. Angular blepharoconjunctivitis of left eye  moxifloxacin (VIGAMOX) 0.5 % Solution    predniSONE (DELTASONE) 10 MG Tab         Comments/MDM:     Continue oral and topical antihistamine, will add steroid as it does not look like it is more of an allergic process and given her contact lens use I think it would be wise to cover with a fluoroquinolone eyedrop as well.  Follow-up as needed.         Differential diagnosis, natural history, supportive care, and indications for immediate follow-up discussed.    Advised the patient to follow-up with the primary care physician for recheck, reevaluation, and consideration of further management.    Please note that this dictation was created using voice recognition software. I have made a reasonable attempt to correct obvious errors, but I expect that there are errors of grammar and possibly content that I did not discover before finalizing the note.    This note was electronically signed by Tylor Gómez PA-C

## 2023-05-03 ENCOUNTER — OFFICE VISIT (OUTPATIENT)
Dept: URGENT CARE | Facility: CLINIC | Age: 28
End: 2023-05-03
Payer: COMMERCIAL

## 2023-05-03 VITALS
WEIGHT: 169.7 LBS | HEIGHT: 59 IN | TEMPERATURE: 98 F | BODY MASS INDEX: 34.21 KG/M2 | RESPIRATION RATE: 16 BRPM | OXYGEN SATURATION: 98 % | SYSTOLIC BLOOD PRESSURE: 104 MMHG | HEART RATE: 84 BPM | DIASTOLIC BLOOD PRESSURE: 70 MMHG

## 2023-05-03 DIAGNOSIS — J02.0 STREP PHARYNGITIS: ICD-10-CM

## 2023-05-03 DIAGNOSIS — J02.9 SORE THROAT: ICD-10-CM

## 2023-05-03 LAB — S PYO DNA SPEC NAA+PROBE: DETECTED

## 2023-05-03 PROCEDURE — 87651 STREP A DNA AMP PROBE: CPT | Performed by: PHYSICIAN ASSISTANT

## 2023-05-03 PROCEDURE — 99214 OFFICE O/P EST MOD 30 MIN: CPT | Performed by: PHYSICIAN ASSISTANT

## 2023-05-03 RX ORDER — AMOXICILLIN 500 MG/1
500 CAPSULE ORAL 2 TIMES DAILY
Qty: 20 CAPSULE | Refills: 0 | Status: SHIPPED | OUTPATIENT
Start: 2023-05-03 | End: 2023-05-13

## 2023-05-03 ASSESSMENT — ENCOUNTER SYMPTOMS
COUGH: 0
TROUBLE SWALLOWING: 1
SWOLLEN GLANDS: 1
HEADACHES: 0
NECK PAIN: 0
SHORTNESS OF BREATH: 0
VOMITING: 0
ABDOMINAL PAIN: 0
HOARSE VOICE: 0

## 2023-05-03 NOTE — LETTER
May 3, 2023         Patient: Mary Jane Prater   YOB: 1995   Date of Visit: 5/3/2023           To Whom it May Concern:    Mary Jane Prater was seen in my clinic on 5/3/2023. Please excuse any absences this week due to acute illness.    If you have any questions or concerns, please don't hesitate to call.        Sincerely,           Vinh French P.A.-C.  Electronically Signed

## 2023-05-04 NOTE — PROGRESS NOTES
"Subjective     Mary Jane Prater is a very pleasant 27 y.o. female who presents with Pharyngitis (Sore throat, chill, and body aches x 1 day)            Pharyngitis   This is a new problem. The current episode started yesterday. The problem has been unchanged. The maximum temperature recorded prior to her arrival was 100.4 - 100.9 F. The fever has been present for Less than 1 day. Associated symptoms include swollen glands and trouble swallowing. Pertinent negatives include no abdominal pain, congestion, coughing, ear pain, headaches, hoarse voice, neck pain, shortness of breath or vomiting. She has tried acetaminophen (Theraflu) for the symptoms. The treatment provided mild relief.       PMH:  has no past medical history on file.  MEDS:   Current Outpatient Medications:     valacyclovir (VALTREX) 1 GM Tab, Take 1 Tablet by mouth 2 times a day., Disp: 14 Tablet, Rfl: 0  ALLERGIES: No Known Allergies  SURGHX:   Past Surgical History:   Procedure Laterality Date    WILBUR BY LAPAROSCOPY  5/24/2018    Procedure: WILBUR BY LAPAROSCOPY;  Surgeon: Kwesi Clay M.D.;  Location: SURGERY [facility];  Service: General     SOCHX:  reports that she has never smoked. She has never used smokeless tobacco. She reports that she does not currently use alcohol. She reports that she does not use drugs.  FH: family history is not on file.      Review of Systems   HENT:  Positive for trouble swallowing. Negative for congestion, ear pain and hoarse voice.    Respiratory:  Negative for cough and shortness of breath.    Gastrointestinal:  Negative for abdominal pain and vomiting.   Musculoskeletal:  Negative for neck pain.   Neurological:  Negative for headaches.        Medications, Allergies, and current problem list reviewed today in Epic        Objective     /70 (BP Location: Left arm, Patient Position: Sitting, BP Cuff Size: Adult)   Pulse 84   Temp 36.7 °C (98 °F) (Temporal)   Resp 16   Ht 1.499 m (4' 11\")   " Wt 77 kg (169 lb 11.2 oz)   SpO2 98%   BMI 34.28 kg/m²      Physical Exam  Vitals and nursing note reviewed.   Constitutional:       General: She is not in acute distress.     Appearance: Normal appearance. She is well-developed. She is not ill-appearing, toxic-appearing or diaphoretic.   HENT:      Head: Normocephalic and atraumatic.      Right Ear: Hearing, tympanic membrane, ear canal and external ear normal.      Left Ear: Hearing, tympanic membrane, ear canal and external ear normal.      Nose: No congestion or rhinorrhea.      Mouth/Throat:      Mouth: Mucous membranes are moist.      Dentition: Normal dentition. No dental caries.      Pharynx: Uvula midline. Pharyngeal swelling, oropharyngeal exudate and posterior oropharyngeal erythema present. No uvula swelling.      Tonsils: Tonsillar exudate present. No tonsillar abscesses.   Eyes:      General: No scleral icterus.        Right eye: No discharge.         Left eye: No discharge.      Conjunctiva/sclera: Conjunctivae normal.   Neck:      Thyroid: No thyromegaly.      Trachea: No tracheal deviation.   Cardiovascular:      Rate and Rhythm: Normal rate and regular rhythm.      Pulses: Normal pulses.      Heart sounds: Normal heart sounds.   Pulmonary:      Effort: Pulmonary effort is normal. No respiratory distress.      Breath sounds: Normal breath sounds. No wheezing, rhonchi or rales.   Musculoskeletal:      Cervical back: Normal range of motion and neck supple.   Lymphadenopathy:      Head:      Right side of head: Submandibular adenopathy present.      Left side of head: Submandibular adenopathy present.      Cervical: Cervical adenopathy present.      Right cervical: No superficial cervical adenopathy.     Left cervical: No superficial cervical adenopathy.   Skin:     General: Skin is warm and dry.      Nails: There is no clubbing.   Neurological:      General: No focal deficit present.      Mental Status: She is alert and oriented to person, place,  and time. Mental status is at baseline.   Psychiatric:         Mood and Affect: Mood normal.         Behavior: Behavior normal.         Thought Content: Thought content normal.         Judgment: Judgment normal.                           Assessment & Plan     This is a very pleasant 27-year-old female presenting with 1 day of sore throat, swollen lymph nodes, fever chills and body aches.  No cough, congestion, vomiting or diarrhea.  Vital signs are normal.  Exam shows posterior oropharynx erythema edema exudate and adenopathy.  In clinic strep testing positive.    1. Sore throat  POCT CEPHEID GROUP A STREP - PCR      2. Strep pharyngitis  amoxicillin (AMOXIL) 500 MG Cap      \  Take full course of antibiotic  Increase fluids and rest  Advil or Tylenol for fever and pain  OTC meds and conservative measures including lozenges, sprays, etc.      Return to clinic or go to ED if symptoms worsen or persist. Red flag symptoms and indications for ED discussed at length. Patient/Parent/Guardian voices understanding. Follow-up with your primary care provider in 3-5 days. All side effects and potential interactions of prescribed medication discussed including allergic response, GI upset, tendon injury, rash, sedation, OCP effectiveness, etc.    Please note that this dictation was created using voice recognition software. I have made every reasonable attempt to correct obvious errors, but I expect that there are errors of grammar and possibly content that I did not discover before finalizing the note.

## 2023-05-17 ENCOUNTER — GYNECOLOGY VISIT (OUTPATIENT)
Dept: OBGYN | Facility: CLINIC | Age: 28
End: 2023-05-17
Payer: COMMERCIAL

## 2023-05-17 ENCOUNTER — HOSPITAL ENCOUNTER (OUTPATIENT)
Dept: LAB | Facility: MEDICAL CENTER | Age: 28
End: 2023-05-17
Attending: OBSTETRICS & GYNECOLOGY
Payer: COMMERCIAL

## 2023-05-17 VITALS — WEIGHT: 174 LBS | BODY MASS INDEX: 35.14 KG/M2

## 2023-05-17 DIAGNOSIS — N94.10 DYSPAREUNIA, FEMALE: ICD-10-CM

## 2023-05-17 DIAGNOSIS — N93.8 DUB (DYSFUNCTIONAL UTERINE BLEEDING): ICD-10-CM

## 2023-05-17 PROCEDURE — 76830 TRANSVAGINAL US NON-OB: CPT | Performed by: OBSTETRICS & GYNECOLOGY

## 2023-05-17 PROCEDURE — 99203 OFFICE O/P NEW LOW 30 MIN: CPT | Mod: 25 | Performed by: OBSTETRICS & GYNECOLOGY

## 2023-05-17 PROCEDURE — 36415 COLL VENOUS BLD VENIPUNCTURE: CPT

## 2023-05-17 PROCEDURE — 84703 CHORIONIC GONADOTROPIN ASSAY: CPT

## 2023-05-17 NOTE — PROGRESS NOTES
Chief complaint; new patient  This patient is a 27 y.o. female , No LMP recorded (lmp unknown). presents complaining of dysfunctional uterine bleeding.    Patient has some mild pregnancy symptoms slight nausea and breasts feel itchy    Review of systems-denies; chest pain, shortness of breath, fever, chills, abdominal pain  Past OB history-  OB History    Para Term  AB Living   1       1     SAB IAB Ectopic Molar Multiple Live Births   1                # Outcome Date GA Lbr Scooby/2nd Weight Sex Delivery Anes PTL Lv   1 SAB      SAB        Past surgical history-   Past Surgical History:   Procedure Laterality Date    WILBUR BY LAPAROSCOPY  2018    Procedure: WILBUR BY LAPAROSCOPY;  Surgeon: Kwesi Clay M.D.;  Location: SURGERY [facility];  Service: General     Past medical history- History reviewed. No pertinent past medical history.  Allergies- Patient has no known allergies.  Present medications-   No outpatient encounter medications on file as of 2023.     No facility-administered encounter medications on file as of 2023.     Family history- no history of breast or ovarian cancer  Social history-   Social History     Socioeconomic History    Marital status: Single     Spouse name: Not on file    Number of children: Not on file    Years of education: Not on file    Highest education level: Not on file   Occupational History    Not on file   Tobacco Use    Smoking status: Never    Smokeless tobacco: Never   Vaping Use    Vaping Use: Never used   Substance and Sexual Activity    Alcohol use: Not Currently    Drug use: No    Sexual activity: Yes     Partners: Male     Birth control/protection: None   Other Topics Concern    Not on file   Social History Narrative    Not on file     Social Determinants of Health     Financial Resource Strain: Unknown (2022)    Overall Financial Resource Strain (CARDIA)     Difficulty of Paying Living Expenses: Patient refused   Food  Insecurity: Unknown (6/9/2022)    Hunger Vital Sign     Worried About Running Out of Food in the Last Year: Patient refused     Ran Out of Food in the Last Year: Patient refused   Transportation Needs: Unknown (6/9/2022)    PRAPARE - Transportation     Lack of Transportation (Medical): Patient refused     Lack of Transportation (Non-Medical): Patient refused   Physical Activity: Not on file   Stress: Not on file   Social Connections: Unknown (6/9/2022)    Social Connection and Isolation Panel [NHANES]     Frequency of Communication with Friends and Family: Patient refused     Frequency of Social Gatherings with Friends and Family: Patient refused     Attends Mosque Services: Patient refused     Active Member of Clubs or Organizations: Patient refused     Attends Club or Organization Meetings: Patient refused     Marital Status: Patient refused   Intimate Partner Violence: Not on file   Housing Stability: Unknown (6/9/2022)    Housing Stability Vital Sign     Unable to Pay for Housing in the Last Year: Patient refused     Number of Places Lived in the Last Year: Not on file     Unstable Housing in the Last Year: Patient refused         Physical examination;   Alert and oriented x3  General-well-developed well-nourished female in no apparent distress  Vitals:    05/17/23 0837   Weight: 174 lb     Skin is warm and dry   HEENT-normocephalic,nontraumatic,PERRLA,EOMI  Throat- no edema or erythema  Neck-supple  Cardiovascular-regular rate and rhythm, normal S1-S2 without murmurs or gallops  Lungs-clear to auscultation bilaterally  Back-negative CVA tenderness  Abdomen-nondistended positive bowel sounds soft nontender without masses or hepatosplenomegaly  Pelvic examination;  External genitalia-without lesions  Vagina-no blood, no discharge  Cervix-closed,no gross lesions  Uterus-  4 weeks size, nontender  Adnexa- without mass or tenderness  Extremities-without cyanosis clubbing or edema  Neurologic-grossly  intact    Urine hCG positive    Transvaginal ultrasound; as performed and read by myself; small intrauterine gestational sac versus fluid collection, gestational sac measurements consistent with 6 weeks?.  No free pelvic fluid both ovaries show small follicles but no evidence of adnexal masses no free pelvic fluid    Impression;  Dysfunctional uterine bleeding-cannot confirm viable gestation    Plan;     Needs initial OB      35 Minutes total spent with the patient in face-to-face contact,5 separate minutes of total time utilized to perform  Ultrasound, greater than 50% of the time has been spent on counseling and coordination of care.  Early DU B counseling performed-all questions answered in detail

## 2023-05-17 NOTE — PROGRESS NOTES
NP here today for GYN appt.  Phone # [phone] (home)   Pt got a positive pregnancy test.  Last cycle sometime in March.

## 2023-05-18 LAB — HCG SERPL QL: POSITIVE

## 2023-05-19 ENCOUNTER — HOSPITAL ENCOUNTER (OUTPATIENT)
Dept: LAB | Facility: MEDICAL CENTER | Age: 28
End: 2023-05-19
Attending: OBSTETRICS & GYNECOLOGY
Payer: COMMERCIAL

## 2023-05-19 LAB — B-HCG SERPL-ACNC: 7863 MIU/ML (ref 0–5)

## 2023-05-19 PROCEDURE — 36415 COLL VENOUS BLD VENIPUNCTURE: CPT

## 2023-05-19 PROCEDURE — 84702 CHORIONIC GONADOTROPIN TEST: CPT

## 2023-05-22 ENCOUNTER — HOSPITAL ENCOUNTER (OUTPATIENT)
Dept: LAB | Facility: MEDICAL CENTER | Age: 28
End: 2023-05-22
Attending: OBSTETRICS & GYNECOLOGY
Payer: COMMERCIAL

## 2023-05-22 LAB — B-HCG SERPL-ACNC: ABNORMAL MIU/ML (ref 0–5)

## 2023-05-22 PROCEDURE — 36415 COLL VENOUS BLD VENIPUNCTURE: CPT

## 2023-05-22 PROCEDURE — 84702 CHORIONIC GONADOTROPIN TEST: CPT

## 2023-05-23 ENCOUNTER — INITIAL PRENATAL (OUTPATIENT)
Dept: OBGYN | Facility: CLINIC | Age: 28
End: 2023-05-23
Payer: COMMERCIAL

## 2023-05-23 ENCOUNTER — APPOINTMENT (OUTPATIENT)
Dept: OBGYN | Facility: CLINIC | Age: 28
End: 2023-05-23
Payer: COMMERCIAL

## 2023-05-23 VITALS — BODY MASS INDEX: 35.35 KG/M2 | WEIGHT: 175 LBS | SYSTOLIC BLOOD PRESSURE: 114 MMHG | DIASTOLIC BLOOD PRESSURE: 84 MMHG

## 2023-05-23 DIAGNOSIS — O36.80X0 PREGNANCY WITH UNCERTAIN FETAL VIABILITY, SINGLE OR UNSPECIFIED FETUS: ICD-10-CM

## 2023-05-23 PROCEDURE — 76830 TRANSVAGINAL US NON-OB: CPT | Performed by: OBSTETRICS & GYNECOLOGY

## 2023-05-23 PROCEDURE — 3074F SYST BP LT 130 MM HG: CPT | Performed by: OBSTETRICS & GYNECOLOGY

## 2023-05-23 PROCEDURE — 3079F DIAST BP 80-89 MM HG: CPT | Performed by: OBSTETRICS & GYNECOLOGY

## 2023-05-23 PROCEDURE — 99213 OFFICE O/P EST LOW 20 MIN: CPT | Mod: 25 | Performed by: OBSTETRICS & GYNECOLOGY

## 2023-05-23 NOTE — PROGRESS NOTES
CC: Missed menses and +UPT    Mary Jane Prater is a 27 y.o.  who presents for REPEAT pregnancy confirmation US. She was seen by Dr. Thibodeaux on  and only small gestational sac was identified with no YS or FP.  She obtained beta level the next day and 2 days after and beta level went from 7800 --> 99751.  This is was a planned pregnancy.  She reports mild cramps but denies bleeding.       OB History:    OB History    Para Term  AB Living   2       1     SAB IAB Ectopic Molar Multiple Live Births   1                # Outcome Date GA Lbr Scooby/2nd Weight Sex Delivery Anes PTL Lv   2 Current            1 SAB      SAB              Objective:   Vitals:  /84   Wt 175 lb   Body mass index is 35.35 kg/m². (Goal BM I>18 <25)  Exam:  General: is in no apparent distress  Psychiatric: appropriate affect, alert and oriented x3, intact judgment and insight.  Respiratory: normal effort  Female GYN: normal female external genitalia without lesions      Procedure:  Transvaginal US performed by me and per my read:    Indication: Amenorrhea, +UPT.     Findings:   Novoa intrauterine pregnancy @ 5w6d by CRL.   Positive yolk sac.   Positive fetal cardiac activity    Right ovary wnl.   Left Ovary wnl.   No free fluid in the cul-de-sac.    Impression:   Viable IUP @ 5w6d.  SHAUN by US of 24.  SHAUN by LMP: 23  Final SHAUN: 24      A/P:   27 y.o.  here for pregnancy confirmation visit    Amenorrhea due to pregnancy.     - US today is NOT c/w LMP. Final SHAUN of 23.     - Normal pregnancy s/sx discussed   - Advised prenatal vitamins, healthy well rounded diet, adequate hydration, and continued exercise.     - Labs:     - PNL not ordered and did not discuss elective testing due to early gestation. Will address all labs and testing at newOB visit in 4wks   - SAB precautions discussed.   - Discussed the size of our practice and that she will see multiple providers during the course of her  care. She expresses understanding.     Follow up in 4 weeks for new OB visit.    Total Time: 20 minutes spent in chart review, exam, counseling and documention      Stephanie Ramires D.O.

## 2023-06-13 ENCOUNTER — INITIAL PRENATAL (OUTPATIENT)
Dept: OBGYN | Facility: CLINIC | Age: 28
End: 2023-06-13
Payer: COMMERCIAL

## 2023-06-13 VITALS — DIASTOLIC BLOOD PRESSURE: 52 MMHG | WEIGHT: 172.8 LBS | SYSTOLIC BLOOD PRESSURE: 92 MMHG | BODY MASS INDEX: 34.9 KG/M2

## 2023-06-13 DIAGNOSIS — Z34.81 ENCOUNTER FOR SUPERVISION OF OTHER NORMAL PREGNANCY, FIRST TRIMESTER: ICD-10-CM

## 2023-06-13 PROCEDURE — 3078F DIAST BP <80 MM HG: CPT | Performed by: STUDENT IN AN ORGANIZED HEALTH CARE EDUCATION/TRAINING PROGRAM

## 2023-06-13 PROCEDURE — 0501F PRENATAL FLOW SHEET: CPT | Performed by: STUDENT IN AN ORGANIZED HEALTH CARE EDUCATION/TRAINING PROGRAM

## 2023-06-13 PROCEDURE — 3074F SYST BP LT 130 MM HG: CPT | Performed by: STUDENT IN AN ORGANIZED HEALTH CARE EDUCATION/TRAINING PROGRAM

## 2023-06-13 ASSESSMENT — EDINBURGH POSTNATAL DEPRESSION SCALE (EPDS)
THE THOUGHT OF HARMING MYSELF HAS OCCURRED TO ME: NEVER
I HAVE BEEN SO UNHAPPY THAT I HAVE BEEN CRYING: NO, NEVER
I HAVE FELT SCARED OR PANICKY FOR NO GOOD REASON: NO, NOT AT ALL
I HAVE LOOKED FORWARD WITH ENJOYMENT TO THINGS: AS MUCH AS I EVER DID
I HAVE BEEN ABLE TO LAUGH AND SEE THE FUNNY SIDE OF THINGS: AS MUCH AS I ALWAYS COULD
I HAVE BEEN ANXIOUS OR WORRIED FOR NO GOOD REASON: NO, NOT AT ALL
I HAVE BEEN SO UNHAPPY THAT I HAVE HAD DIFFICULTY SLEEPING: NOT AT ALL
I HAVE BLAMED MYSELF UNNECESSARILY WHEN THINGS WENT WRONG: NO, NEVER
THINGS HAVE BEEN GETTING ON TOP OF ME: NO, I HAVE BEEN COPING AS WELL AS EVER

## 2023-06-13 NOTE — PROGRESS NOTES
Pt. Here for NOB visit today.   #[phone]  First prenatal care  Pt. States no complaints  Pharmacy verified  Chaperone offered and provided

## 2023-06-13 NOTE — PROGRESS NOTES
Establish Pregnancy Visit    CC: First OB Visit    HPI: Patient is a 28 y.o.  at 8w6d who presents for her first OB visit.  She is not yet feeling fetal movement.  She reports one day of vaginal spotting on Bryce 2, no vaginal bleeding since then. Denies leakage of fluid, denies contractions.   She denies nausea/vomiting, headaches, or urinary symptoms.      DATING:   Estimated Date of Delivery: 24 by 6 wk US    GYN HX:   Last Pap: age 21 - declines today; wants to do pap next visit  Hx Moderate or Severe Dysplasia : no  Hx STD : no    OBSTETRIC HISTORY:  OB History    Para Term  AB Living   2       1     SAB IAB Ectopic Molar Multiple Live Births   1                # Outcome Date GA Lbr Scooby/2nd Weight Sex Delivery Anes PTL Lv   2 Current            1 SAB      SAB          MEDICAL HISTORY:  No past medical history on file.    MEDICATIONS:  Current Outpatient Medications on File Prior to Visit   Medication Sig Dispense Refill    Prenatal MV-Min-Fe Fum-FA-DHA (PRENATAL 1 PO) Take 1 Tablet by mouth every day.       No current facility-administered medications on file prior to visit.       FAMILY HISTORY:  Family History   Problem Relation Age of Onset    No Known Problems Mother     Heart Disease Father     Diabetes Father        SURGICAL HISTORY:  Past Surgical History:   Procedure Laterality Date    WILBUR BY LAPAROSCOPY  2018    Procedure: WILBUR BY LAPAROSCOPY;  Surgeon: Kwesi Clay M.D.;  Location: SURGERY [facility];  Service: General       ALLERGIES / REACTIONS:  No Known Allergies             SOCIAL HISTORY:   reports that she has never smoked. She has never used smokeless tobacco. She reports that she does not currently use alcohol. She reports that she does not use drugs.    ROS:   Gen: no fevers or chills, no significant weight loss or gain, excessive fatigue  Respiratory:  no cough or dyspnea  Cardiac:  no chest pain, no palpitations, no syncope  Breast: no breast  discharge, pain, lump or skin changes  GI:  no heartburn, no abdominal pain, no nausea or vomiting  Urinary: no dysuria, urgency, frequency, incontinence   Psych: no depression or anxiety  Neuro: no migraines with aura, fainting spells, numbness or tingling  Extremities: no joint pain, persistently swollen ankles, recurrent leg cramps         PHYSICAL EXAMINATION:  Vital Signs:   Vitals:    06/13/23 0946   BP: 92/52   Weight: 172 lb 12.8 oz     Body mass index is 34.9 kg/m².  Constitutional: The patient is well developed and well nourished.  Psychiatric: Patient is oriented to time place and person.   Skin: No rash observed.  Neck: Neck appears symmetric. There are no masses or adenopathy present.  Respiratory: normal effort  Abdomen: Soft, non-tender.  Pelvic: deferred to next visit  Extremeties: Legs are symmetric and without tenderness. There is no edema present.  Obstetrics:   bpm    ACOG SCREENING  Infection Prevention  1. High Risk For HIV: No 6. Rash Or Illness Since LMP: No     2. High Risk For Hepatitis B or C: No 7. History Of STD, GC, Chlamydia, HPV Syphilis: No     3. Live With Someone With TB Or Exposed To TB: No 8. Have a cat in the home?: No     4. Patient Or Partner Has A History Of Herpes: No      5. History of Chicken Pox: Yes (Comment: Child)             Genetic Screening/Teratology Counseling- Includes patient, baby's father, or anyone in either family with:  Patient's age 35 years or older as of estimated date of delivery: No     Thalassemia (Italian, Greek, Mediterranean, or  background): MCV less than 80: No     Neural tube defect (Meningomyelocele, Spina bifida, or Anencephaly): No     Congenital heart defect: No     Down syndrome: No     Adalid-Sachs (Ashkenazi Rastafari, Cajun, Irish Rawlins): No     Canavan disease (Ashkenazi Rastafari): No     Familial dysautonomia (Ashkenazi Rastafari): No     Sickle cell disease or trait (): No     Hemophilia or other blood disorders: No      Muscular dystrophy: No    Cystic fibrosis: No     Cristhian's chorea: No     Mental retardation/autism: No     Other inherited genetic or chromosomal disorder: No     Maternal metabolic disorder (eg. Type 1 diabetes, PKU): No     Patient or baby's father had child with birth defects not listed above: No     Recurrent pregnancy loss, or a stillbirth: No     Medications (including supplements, vitamins, herbs, or OTC drugs)/illicit/recreational drugs/alcohol since last menstrual period: No                 ASSESSMENT AND PLAN:  Patient Active Problem List    Diagnosis Date Noted    Herpes 2022    Dyspareunia, female 11/15/2022    Hordeolum externum of left upper eyelid 11/15/2022    Encounter to establish care with new doctor 2022    Overweight 2022       28 y.o.  at 8w6d   - PNL and STI screening ordered   - Dating reviewed: Dated by 6 wk US  - Discussed options for genetic/aneuploidy testing and information given for pt to consider.  Advised to call insurance for cost of testing.           - she currently desires aneuploidy testing; will order next visit  - Desires pap screening next visit  - Given order for anatomy scan  - Discussed recommendation for flu, Tdap vaccine during pregnancy  - Discussed office policies, prenatal care timeline, weight gain, diet and activity.  - Taking PNV.  - Increase water intake and encouraged healthy nutrition. Encouraged moderate exercise may continue into final trimester.     Return in 4 weeks for next prenatal visit     Олег Thompson DO, FACOG

## 2023-07-08 ENCOUNTER — HOSPITAL ENCOUNTER (OUTPATIENT)
Dept: LAB | Facility: MEDICAL CENTER | Age: 28
End: 2023-07-08
Attending: STUDENT IN AN ORGANIZED HEALTH CARE EDUCATION/TRAINING PROGRAM
Payer: COMMERCIAL

## 2023-07-08 DIAGNOSIS — Z34.81 ENCOUNTER FOR SUPERVISION OF OTHER NORMAL PREGNANCY, FIRST TRIMESTER: ICD-10-CM

## 2023-07-08 LAB
ABO GROUP BLD: NORMAL
BLD GP AB SCN SERPL QL: NORMAL
ERYTHROCYTE [DISTWIDTH] IN BLOOD BY AUTOMATED COUNT: 41.4 FL (ref 35.9–50)
HBV SURFACE AG SER QL: ABNORMAL
HCT VFR BLD AUTO: 40.4 % (ref 37–47)
HCV AB SER QL: ABNORMAL
HGB BLD-MCNC: 14.1 G/DL (ref 12–16)
HIV 1+2 AB+HIV1 P24 AG SERPL QL IA: NORMAL
MCH RBC QN AUTO: 31.5 PG (ref 27–33)
MCHC RBC AUTO-ENTMCNC: 34.9 G/DL (ref 32.2–35.5)
MCV RBC AUTO: 90.2 FL (ref 81.4–97.8)
PLATELET # BLD AUTO: 335 K/UL (ref 164–446)
PMV BLD AUTO: 9.5 FL (ref 9–12.9)
RBC # BLD AUTO: 4.48 M/UL (ref 4.2–5.4)
RH BLD: NORMAL
RUBV AB SER QL: 74.2 IU/ML
T PALLIDUM AB SER QL IA: ABNORMAL
WBC # BLD AUTO: 12.4 K/UL (ref 4.8–10.8)

## 2023-07-08 PROCEDURE — 87389 HIV-1 AG W/HIV-1&-2 AB AG IA: CPT

## 2023-07-08 PROCEDURE — 36415 COLL VENOUS BLD VENIPUNCTURE: CPT

## 2023-07-08 PROCEDURE — 87491 CHLMYD TRACH DNA AMP PROBE: CPT

## 2023-07-08 PROCEDURE — 85027 COMPLETE CBC AUTOMATED: CPT

## 2023-07-08 PROCEDURE — 86850 RBC ANTIBODY SCREEN: CPT

## 2023-07-08 PROCEDURE — 86900 BLOOD TYPING SEROLOGIC ABO: CPT

## 2023-07-08 PROCEDURE — 86762 RUBELLA ANTIBODY: CPT

## 2023-07-08 PROCEDURE — 87591 N.GONORRHOEAE DNA AMP PROB: CPT

## 2023-07-08 PROCEDURE — 87086 URINE CULTURE/COLONY COUNT: CPT

## 2023-07-08 PROCEDURE — 86780 TREPONEMA PALLIDUM: CPT

## 2023-07-08 PROCEDURE — 87340 HEPATITIS B SURFACE AG IA: CPT

## 2023-07-08 PROCEDURE — 86901 BLOOD TYPING SEROLOGIC RH(D): CPT

## 2023-07-08 PROCEDURE — 86803 HEPATITIS C AB TEST: CPT

## 2023-07-10 LAB
BACTERIA UR CULT: NORMAL
SIGNIFICANT IND 70042: NORMAL
SITE SITE: NORMAL
SOURCE SOURCE: NORMAL

## 2023-07-11 ENCOUNTER — ROUTINE PRENATAL (OUTPATIENT)
Dept: OBGYN | Facility: CLINIC | Age: 28
End: 2023-07-11
Payer: COMMERCIAL

## 2023-07-11 ENCOUNTER — HOSPITAL ENCOUNTER (OUTPATIENT)
Facility: MEDICAL CENTER | Age: 28
End: 2023-07-11
Attending: STUDENT IN AN ORGANIZED HEALTH CARE EDUCATION/TRAINING PROGRAM
Payer: COMMERCIAL

## 2023-07-11 VITALS — BODY MASS INDEX: 35.75 KG/M2 | SYSTOLIC BLOOD PRESSURE: 119 MMHG | DIASTOLIC BLOOD PRESSURE: 68 MMHG | WEIGHT: 177 LBS

## 2023-07-11 DIAGNOSIS — Z34.81 ENCOUNTER FOR SUPERVISION OF OTHER NORMAL PREGNANCY, FIRST TRIMESTER: ICD-10-CM

## 2023-07-11 PROCEDURE — 3074F SYST BP LT 130 MM HG: CPT | Performed by: STUDENT IN AN ORGANIZED HEALTH CARE EDUCATION/TRAINING PROGRAM

## 2023-07-11 PROCEDURE — 81420 FETAL CHRMOML ANEUPLOIDY: CPT

## 2023-07-11 PROCEDURE — 36415 COLL VENOUS BLD VENIPUNCTURE: CPT | Performed by: STUDENT IN AN ORGANIZED HEALTH CARE EDUCATION/TRAINING PROGRAM

## 2023-07-11 PROCEDURE — 3078F DIAST BP <80 MM HG: CPT | Performed by: STUDENT IN AN ORGANIZED HEALTH CARE EDUCATION/TRAINING PROGRAM

## 2023-07-11 PROCEDURE — 36415 COLL VENOUS BLD VENIPUNCTURE: CPT

## 2023-07-11 PROCEDURE — 81422 FETAL CHRMOML MICRODELTJ: CPT

## 2023-07-11 PROCEDURE — 0502F SUBSEQUENT PRENATAL CARE: CPT | Performed by: STUDENT IN AN ORGANIZED HEALTH CARE EDUCATION/TRAINING PROGRAM

## 2023-07-11 NOTE — PROGRESS NOTES
OB Visit Note - 12w6d     MEDICAL DECISION MAKING:  Mary Jane Prater is a 28 y.o. female  at 12w6d     S: Pt presents for routine OB follow up. Good fetal movement. No contractions, vaginal bleeding, or leakage of fluid.  Still having some nausea but improving, does not feel she needs meds.  Still needs to schedule her anatomy scan.  Desires genetic testing today. Declines pap. Questions answered.     O:  Vitals:    23 0939   BP: 119/68     Patients' weight gain, fluid intake and exercise level discussed.      FHT: 146 bpm     See flow sheet.        Recent Labs     22  1208 22  1209 11/15/22  1627 23  0701   ABOGROUP  --   --   --  O   RUBELLAIGG  --   --   --  74.20   HEPBSAG   < >  --  Non-Reactive Non-Reactive   HEPCAB  --  Non-Reactive Non-Reactive Non-Reactive   GCBYDNAPR  --   --  Negative  --    TSHULTRASEN  --  2.930  --   --     < > = values in this interval not displayed.          A/P:Mary Jane Prater is a 28 y.o. female  at 12w6d who presents for routine obstetric follow-up.    P:  Continue FKCs.    Labor and return precautions given.  Instructions given on where to go - patient verbalized understanding.  All questions answered. Anticipatory guidance.  Encouraged adequate water intake.  PP contraception:undecided  Will need pap postpartum, declines   GBS at 36 weeks  Encouraged to call and schedule anatomy scan for 18-20 weeks  NIPT drawn today; MSAFP at next visit  F/u in 4 week(s) and PRN    Pregnancy complicated by:  Patient Active Problem List   Diagnosis    Encounter to establish care with new doctor    Overweight    Dyspareunia, female    Hordeolum externum of left upper eyelid    Herpes       The patient will follow up in 4 week(s). She was counseled to call and/or report to L&D for vaginal bleeding, regular contractions, leakage of fluid or decreased fetal movement.    Олег Thompson D.O.

## 2023-07-19 LAB
22Q112 DEL SYND Q0669: NORMAL
5P15.2 DEL BLD/T FISH: NORMAL
ANNOTATION COMMENT IMP: NORMAL
AS GENE MUT ANL BLD/T: NORMAL
DEL 1P36 SYND Q0208: NORMAL
EER FETAL ANEUPLOIDY W/MICRODELETIONS NL11653: NORMAL
FET CHR X + Y ANEUP RISK PLAS.CFDNA QN: NORMAL
FET SEX US: NORMAL
FET TS 13 RISK PLAS.CFDNA QL: NORMAL
FET TS 18 RISK PLAS.CFDNA QL: NORMAL
FET TS 21 RISK PLAS.CFDNA QL: NORMAL
FETAL ANEUPLOIDY - TRIPLOIDY NV11651: NORMAL
FETAL FRACTION Q0204: 5.7 %
GA (DAYS): 6 D
GA (WEEKS): 12 WK
NUMBER OF FETUSES Q0671: 1
PWS GENE MUT ANL BLD/T: NORMAL
REPORT FETAL SEX NL11652: YES

## 2023-08-10 ENCOUNTER — HOSPITAL ENCOUNTER (OUTPATIENT)
Facility: MEDICAL CENTER | Age: 28
End: 2023-08-10
Attending: STUDENT IN AN ORGANIZED HEALTH CARE EDUCATION/TRAINING PROGRAM
Payer: COMMERCIAL

## 2023-08-10 ENCOUNTER — ROUTINE PRENATAL (OUTPATIENT)
Dept: OBGYN | Facility: CLINIC | Age: 28
End: 2023-08-10
Payer: COMMERCIAL

## 2023-08-10 VITALS — BODY MASS INDEX: 36.56 KG/M2 | DIASTOLIC BLOOD PRESSURE: 74 MMHG | WEIGHT: 181 LBS | SYSTOLIC BLOOD PRESSURE: 114 MMHG

## 2023-08-10 DIAGNOSIS — Z34.82 ENCOUNTER FOR SUPERVISION OF OTHER NORMAL PREGNANCY, SECOND TRIMESTER: ICD-10-CM

## 2023-08-10 PROCEDURE — 3074F SYST BP LT 130 MM HG: CPT | Performed by: STUDENT IN AN ORGANIZED HEALTH CARE EDUCATION/TRAINING PROGRAM

## 2023-08-10 PROCEDURE — 3078F DIAST BP <80 MM HG: CPT | Performed by: STUDENT IN AN ORGANIZED HEALTH CARE EDUCATION/TRAINING PROGRAM

## 2023-08-10 PROCEDURE — 0502F SUBSEQUENT PRENATAL CARE: CPT | Performed by: STUDENT IN AN ORGANIZED HEALTH CARE EDUCATION/TRAINING PROGRAM

## 2023-08-10 PROCEDURE — 82105 ALPHA-FETOPROTEIN SERUM: CPT

## 2023-08-10 PROCEDURE — 36415 COLL VENOUS BLD VENIPUNCTURE: CPT | Performed by: STUDENT IN AN ORGANIZED HEALTH CARE EDUCATION/TRAINING PROGRAM

## 2023-08-11 NOTE — PROGRESS NOTES
OB Visit Note - 17w2d     MEDICAL DECISION MAKING:  Mary Jane Prater is a 28 y.o. female  at 17w2d     S: Pt presents for routine OB follow up. Not yet feeling fetal movement. No contractions, vaginal bleeding, or leakage of fluid.  Questions answered.     O:  Vitals:    08/10/23 1309   BP: 114/74     Patients' weight gain, fluid intake and exercise level discussed.    FHT: 156 bpm       See flow sheet.        Recent Labs     23  0701 23  1708   ABOGROUP O  --    RUBELLAIGG 74.20  --    HEPBSAG Non-Reactive  --    HEPCAB Non-Reactive  --    GCBYDNAPR  --  Negative          A/P:Mary Jane Prater is a 28 y.o. female  at 17w2d who presents for routine obstetric follow-up.    P:  Anatomy US scheduled for end of August  MSAFP ordered  Reviewed NIPT - low risk    Labor and return precautions given.  Instructions given on where to go - patient verbalized understanding.  All questions answered. Anticipatory guidance.  Encouraged adequate water intake.  PP contraception: undecided  GBS at 36 weeks  F/u in 4 week(s) and PRN    Pregnancy complicated by:  Patient Active Problem List   Diagnosis    Encounter to establish care with new doctor    Overweight    Dyspareunia, female    Hordeolum externum of left upper eyelid    Herpes       The patient will follow up in 4 week(s). She was counseled to call and/or report to L&D for vaginal bleeding, regular contractions, leakage of fluid or decreased fetal movement.    Олег Thompson D.O.

## 2023-08-13 LAB
# FETUSES US: NORMAL
AFP MOM SERPL: 1.16
AFP SERPL-MCNC: 41 NG/ML
AGE - REPORTED: 28.6 YR
CURRENT SMOKER: NO
FAMILY MEMBER DISEASES HX: NO
GA METHOD: NORMAL
GA: NORMAL WK
IDDM PATIENT QL: NO
INTEGRATED SCN PATIENT-IMP: NORMAL
SPECIMEN DRAWN SERPL: NORMAL

## 2023-08-30 ENCOUNTER — APPOINTMENT (OUTPATIENT)
Dept: RADIOLOGY | Facility: MEDICAL CENTER | Age: 28
End: 2023-08-30
Attending: STUDENT IN AN ORGANIZED HEALTH CARE EDUCATION/TRAINING PROGRAM
Payer: COMMERCIAL

## 2023-08-31 ENCOUNTER — APPOINTMENT (OUTPATIENT)
Dept: OBGYN | Facility: CLINIC | Age: 28
End: 2023-08-31
Payer: COMMERCIAL

## 2023-09-14 ENCOUNTER — OFFICE VISIT (OUTPATIENT)
Dept: URGENT CARE | Facility: PHYSICIAN GROUP | Age: 28
End: 2023-09-14
Payer: COMMERCIAL

## 2023-09-14 VITALS
TEMPERATURE: 98.1 F | OXYGEN SATURATION: 97 % | DIASTOLIC BLOOD PRESSURE: 70 MMHG | BODY MASS INDEX: 36.62 KG/M2 | HEART RATE: 108 BPM | WEIGHT: 181.66 LBS | SYSTOLIC BLOOD PRESSURE: 126 MMHG | RESPIRATION RATE: 16 BRPM | HEIGHT: 59 IN

## 2023-09-14 DIAGNOSIS — J02.9 SORE THROAT: ICD-10-CM

## 2023-09-14 DIAGNOSIS — R09.89 RUNNY NOSE: ICD-10-CM

## 2023-09-14 DIAGNOSIS — R05.1 ACUTE COUGH: ICD-10-CM

## 2023-09-14 LAB
FLUAV RNA SPEC QL NAA+PROBE: NEGATIVE
FLUBV RNA SPEC QL NAA+PROBE: NEGATIVE
RSV RNA SPEC QL NAA+PROBE: NEGATIVE
S PYO DNA SPEC NAA+PROBE: NOT DETECTED
SARS-COV-2 RNA RESP QL NAA+PROBE: NEGATIVE

## 2023-09-14 PROCEDURE — 0241U POCT CEPHEID COV-2, FLU A/B, RSV - PCR: CPT | Performed by: PHYSICIAN ASSISTANT

## 2023-09-14 PROCEDURE — 87651 STREP A DNA AMP PROBE: CPT | Performed by: PHYSICIAN ASSISTANT

## 2023-09-14 PROCEDURE — 3074F SYST BP LT 130 MM HG: CPT | Performed by: PHYSICIAN ASSISTANT

## 2023-09-14 PROCEDURE — 3078F DIAST BP <80 MM HG: CPT | Performed by: PHYSICIAN ASSISTANT

## 2023-09-14 PROCEDURE — 99213 OFFICE O/P EST LOW 20 MIN: CPT | Performed by: PHYSICIAN ASSISTANT

## 2023-09-14 ASSESSMENT — ENCOUNTER SYMPTOMS: COUGH: 1

## 2023-09-14 NOTE — PROGRESS NOTES
"Subjective:   Mary Jane Prater is a 28 y.o. female who presents for Cough (Cough, runny nose, sore throat alleviated with hot tea and increased hydration. Symptoms started x3 days. )     This is a very pleasant 28-year-old female who presents with cough and runny nose ST started Monday.  She is 21 weeks pregnant.  No fevers chills or  myalgias.  No n/v/d.  Cough productive of phlegm.  No pain with swallowing.  Drinking hot teas.  Congestion, nasal congestion.  H/o seasonal allergies.  Takes claritin, uses nasal rinses. No known sick contacts.         Review of Systems   Respiratory:  Positive for cough.        Medications:  PRENATAL 1 PO    Allergies:             Patient has no known allergies.    Surgical History:         Past Surgical History:   Procedure Laterality Date    WILBUR BY LAPAROSCOPY  5/24/2018    Procedure: WILBUR BY LAPAROSCOPY;  Surgeon: Kwesi Clay M.D.;  Location: SURGERY [facility];  Service: General       Past Social Hx:  Mary Jane Prater  reports that she has never smoked. She has never used smokeless tobacco. She reports that she does not currently use alcohol. She reports that she does not use drugs.     Past Family Hx:   Mary Jane Prater family history includes Diabetes in her father; Heart Disease in her father; No Known Problems in her mother.       Problem list, medications, and allergies reviewed by myself today in Epic.     Objective:     /70 (BP Location: Left arm, Patient Position: Sitting, BP Cuff Size: Adult)   Pulse (!) 108   Temp 36.7 °C (98.1 °F) (Temporal)   Resp 16   Ht 1.499 m (4' 11\")   Wt 82.4 kg (181 lb 10.5 oz)   SpO2 97%   BMI 36.69 kg/m²     Physical Exam  Vitals and nursing note reviewed.   Constitutional:       General: She is not in acute distress.     Appearance: Normal appearance. She is not ill-appearing or toxic-appearing.   HENT:      Head: Normocephalic.      Jaw: No trismus.      Right Ear: External ear normal. No " drainage. A middle ear effusion is present. No mastoid tenderness. Tympanic membrane is not erythematous or bulging.      Left Ear: External ear normal. No drainage. A middle ear effusion is present. No mastoid tenderness. Tympanic membrane is not erythematous or bulging.      Nose: Congestion and rhinorrhea present.      Right Turbinates: Enlarged and swollen.      Left Turbinates: Enlarged and swollen.      Mouth/Throat:      Mouth: Mucous membranes are moist.      Tongue: No lesions. Tongue does not deviate from midline.      Palate: No lesions.      Pharynx: Uvula midline. Posterior oropharyngeal erythema present. No oropharyngeal exudate or uvula swelling.      Tonsils: No tonsillar exudate or tonsillar abscesses.   Eyes:      General:         Right eye: No discharge.         Left eye: No discharge.      Extraocular Movements: Extraocular movements intact.   Cardiovascular:      Rate and Rhythm: Normal rate and regular rhythm.      Pulses: Normal pulses.      Heart sounds: Normal heart sounds. No murmur heard.  Pulmonary:      Effort: Pulmonary effort is normal. No accessory muscle usage or respiratory distress.      Breath sounds: Normal breath sounds and air entry. No stridor or decreased air movement. No wheezing, rhonchi or rales.      Comments: Lungs CTA b/l  Musculoskeletal:      Cervical back: Normal range of motion. No rigidity.   Lymphadenopathy:      Head:      Right side of head: No tonsillar adenopathy.      Left side of head: No tonsillar adenopathy.      Cervical: No cervical adenopathy.   Skin:     General: Skin is warm.      Findings: No rash.   Neurological:      Mental Status: She is alert.   Psychiatric:         Behavior: Behavior is cooperative.       Results for orders placed or performed in visit on 09/14/23   POCT CoV-2, Flu A/B, RSV by PCR   Result Value Ref Range    SARS-CoV-2 by PCR Negative Negative, Invalid    Influenza virus A RNA Negative Negative, Invalid    Influenza virus B, PCR  Negative Negative, Invalid    RSV, PCR Negative Negative, Invalid   POCT GROUP A STREP, PCR   Result Value Ref Range    POC Group A Strep, PCR Not Detected Not Detected, Invalid       Assessment/Plan:     Diagnosis and Associated Orders:     1. Acute cough  - POCT CoV-2, Flu A/B, RSV by PCR  - POCT GROUP A STREP, PCR    2. Sore throat  - POCT CoV-2, Flu A/B, RSV by PCR  - POCT GROUP A STREP, PCR    3. Runny nose  - POCT CoV-2, Flu A/B, RSV by PCR  - POCT GROUP A STREP, PCR        Comments/MDM:  Viral panel and strep PCR all negative.  Suspect viral etiology.  Vital signs stable and reassuring.  Remedies that are safe to use for cold related symptoms during pregnancy including warm salt water gargles, rest, honey in hot water, nasal saline spray, humidifier.  Pregnancy safe cold medications include Tylenol which is safe during the entire pregnancy as needed, chlorpheniramine or diphenhydramine, Mucinex, dextromethorphan are also safe throughout pregnancy.  Do not recommend use of ibuprofen, naproxen, Celebrex, aspirin, benzocaine, phenylephrine, nasal steroid sprays.   I personally reviewed prior external notes and test results pertinent to today's visit. Supportive care, natural history, differential diagnoses, and indications for immediate follow-up discussed. Return to clinic or go to ED if symptoms worsen or persist.  Red flag symptoms discussed.  Patient/Parent/Guardian voices understanding. Follow-up with your primary care provider in 3-5 days.  All side effects of medication discussed including allergic response, GI upset, tendon injury, rash, sedation etc    Please note that this dictation was created using voice recognition software. I have made a reasonable attempt to correct obvious errors, but I expect that there are errors of grammar and possibly content that I did not discover before finalizing the note.    This note was electronically signed by Nancy Snider PA-C

## 2023-09-14 NOTE — LETTER
September 14, 2023    To Whom It May Concern:         This is confirmation that Mary Jane Prater attended her scheduled appointment with Nancy Snider P.A.-C. on 9/14/23.  Please excuse patient from work today.         If you have any questions please do not hesitate to call me at the phone number listed below.    Sincerely,          Nancy Snider P.A.-C.  [phone]                   Spiral Flap Text: The defect edges were debeveled with a #15 scalpel blade.  Given the location of the defect, shape of the defect and the proximity to free margins a spiral flap was deemed most appropriate.  Using a sterile surgical marker, an appropriate rotation flap was drawn incorporating the defect and placing the expected incisions within the relaxed skin tension lines where possible. The area thus outlined was incised deep to adipose tissue with a #15 scalpel blade.  The skin margins were undermined to an appropriate distance in all directions utilizing iris scissors.

## 2023-09-18 ENCOUNTER — OFFICE VISIT (OUTPATIENT)
Dept: MEDICAL GROUP | Facility: PHYSICIAN GROUP | Age: 28
End: 2023-09-18
Payer: COMMERCIAL

## 2023-09-18 VITALS
HEART RATE: 100 BPM | SYSTOLIC BLOOD PRESSURE: 116 MMHG | TEMPERATURE: 98 F | BODY MASS INDEX: 37.9 KG/M2 | WEIGHT: 188 LBS | HEIGHT: 59 IN | OXYGEN SATURATION: 97 % | DIASTOLIC BLOOD PRESSURE: 78 MMHG

## 2023-09-18 DIAGNOSIS — R05.1 ACUTE COUGH: ICD-10-CM

## 2023-09-18 PROCEDURE — 99213 OFFICE O/P EST LOW 20 MIN: CPT | Performed by: NURSE PRACTITIONER

## 2023-09-18 PROCEDURE — 3078F DIAST BP <80 MM HG: CPT | Performed by: NURSE PRACTITIONER

## 2023-09-18 PROCEDURE — 3074F SYST BP LT 130 MM HG: CPT | Performed by: NURSE PRACTITIONER

## 2023-09-18 NOTE — ASSESSMENT & PLAN NOTE
New to examiner. Patient reports that she has had a cough for the past 8 days. She was seen in  on 9/14/2023, tested negative for COV-2, Flu A/B, and group B strep. She has been using teas, humidifier, cough drops, tylenol. States that she has pain with the cough, it is interfering with sleep. She does tend to progress to bronchitis in the past. She is currently 22 weeks pregnant. Has not had relief of symptoms in the past with tessalon perles. She will be following up with her OB/GYN next week, was given the okay to use flonase and nasal rinse.

## 2023-09-20 ENCOUNTER — ROUTINE PRENATAL (OUTPATIENT)
Dept: OBGYN | Facility: CLINIC | Age: 28
End: 2023-09-20
Payer: COMMERCIAL

## 2023-09-20 VITALS — DIASTOLIC BLOOD PRESSURE: 70 MMHG | BODY MASS INDEX: 37.97 KG/M2 | WEIGHT: 188 LBS | SYSTOLIC BLOOD PRESSURE: 112 MMHG

## 2023-09-20 DIAGNOSIS — O99.212 OBESITY AFFECTING PREGNANCY IN SECOND TRIMESTER: ICD-10-CM

## 2023-09-20 DIAGNOSIS — Z34.82 ENCOUNTER FOR SUPERVISION OF OTHER NORMAL PREGNANCY, SECOND TRIMESTER: ICD-10-CM

## 2023-09-20 PROCEDURE — 0502F SUBSEQUENT PRENATAL CARE: CPT | Performed by: STUDENT IN AN ORGANIZED HEALTH CARE EDUCATION/TRAINING PROGRAM

## 2023-09-20 PROCEDURE — 3074F SYST BP LT 130 MM HG: CPT | Performed by: STUDENT IN AN ORGANIZED HEALTH CARE EDUCATION/TRAINING PROGRAM

## 2023-09-20 PROCEDURE — 3078F DIAST BP <80 MM HG: CPT | Performed by: STUDENT IN AN ORGANIZED HEALTH CARE EDUCATION/TRAINING PROGRAM

## 2023-09-20 NOTE — PROGRESS NOTES
OB Visit Note - 23w0d     MEDICAL DECISION MAKING:  Mary Jane rPater is a 28 y.o. female  at 23w0d     S: Pt presents for routine OB follow up. Good fetal movement. No contractions, vaginal bleeding, or leakage of fluid.  Reports having cold for the last 10 days with cough, congestion and sore throat.  Was seen in urgent care and negative for COVID, influenza and strep throat. She is starting to feel better today.  No fevers/chills.     Questions answered.     O:  Vitals:    23 1011   BP: 112/70     Patients' weight gain, fluid intake and exercise level discussed.    FH: 24 cm  FHT: 144 bpm     See flow sheet.        Recent Labs     23  0701 23  1708   ABOGROUP O  --    ABSCRN NEG  --    HEMOGLOBIN 14.1  --    PLATELETCT 335  --    RUBELLAIGG 74.20  --    HEPBSAG Non-Reactive  --    HEPCAB Non-Reactive  --    GCBYDNAPR  --  Negative          A/P:Mary Jane Prater is a 28 y.o. female  at 23w0d who presents for routine obstetric follow-up.    P:  Continue FKCs.    Anatomy ultrasound scheduled for   MSAFP negative  Labor and return precautions given.  Instructions given on where to go - patient verbalized understanding.  All questions answered. Anticipatory guidance.  Encouraged adequate water intake.  PP contraception: undecided  GBS at 36 weeks  F/u in 4 week(s) and PRN      Pregnancy complicated by:  Patient Active Problem List   Diagnosis    Encounter to establish care with new doctor    Overweight    Dyspareunia, female    Hordeolum externum of left upper eyelid    Herpes    Acute cough       The patient will follow up in 4 week(s). She was counseled to call and/or report to L&D for vaginal bleeding, regular contractions, leakage of fluid or decreased fetal movement.    Олег Thompson D.O.

## 2023-09-21 ENCOUNTER — HOSPITAL ENCOUNTER (OUTPATIENT)
Dept: RADIOLOGY | Facility: MEDICAL CENTER | Age: 28
End: 2023-09-21
Attending: STUDENT IN AN ORGANIZED HEALTH CARE EDUCATION/TRAINING PROGRAM
Payer: COMMERCIAL

## 2023-09-21 ENCOUNTER — TELEPHONE (OUTPATIENT)
Dept: OBGYN | Facility: CLINIC | Age: 28
End: 2023-09-21
Payer: COMMERCIAL

## 2023-09-21 DIAGNOSIS — Z34.82 ENCOUNTER FOR SUPERVISION OF OTHER NORMAL PREGNANCY, SECOND TRIMESTER: ICD-10-CM

## 2023-09-21 DIAGNOSIS — Z34.81 ENCOUNTER FOR SUPERVISION OF OTHER NORMAL PREGNANCY, FIRST TRIMESTER: ICD-10-CM

## 2023-09-21 PROCEDURE — 76805 OB US >/= 14 WKS SNGL FETUS: CPT

## 2023-09-21 NOTE — TELEPHONE ENCOUNTER
----- Message from Олег Thompson D.O. sent at 9/21/2023  1:46 PM PDT -----  Can you please let patient know that there were some structures that weren't able to be assessed on her ultrasound?  This was due to baby being very active and the positioning of the baby.  There isn't any concern for any abnormality.  I have ordered her another ultrasound to be repeated in about 3-4 weeks.    Thank you!    9/21/2023 1358  Called pt and informed as above. Pt agreed and verbalized understanding.

## 2023-09-21 NOTE — PROGRESS NOTES
Repeat US ordered for 3-4 weeks due to nonvisualization of RVOT, lips, and left foot.  Fetus noted to be very active and in footling breech position during ultrasound.    Олег Thompson D.O.  
no

## 2023-09-24 NOTE — PROGRESS NOTES
"CC: The encounter diagnosis was Acute cough.                                                                                                                                       HPI:   Mary Jane presents today with the following concerns:    Acute cough  New to examiner. Patient reports that she has had a cough for the past 8 days. She was seen in  on 9/14/2023, tested negative for COV-2, Flu A/B, and group B strep. She has been using teas, humidifier, cough drops, tylenol. States that she has pain with the cough, it is interfering with sleep. She does tend to progress to bronchitis in the past. She is currently 22 weeks pregnant. Has not had relief of symptoms in the past with tessalon perles. She will be following up with her OB/GYN next week, was given the okay to use flonase and nasal rinse.    Patient Active Problem List    Diagnosis Date Noted    Acute cough 09/18/2023    Herpes 11/23/2022    Dyspareunia, female 11/15/2022    Hordeolum externum of left upper eyelid 11/15/2022    Encounter to establish care with new doctor 06/09/2022    Overweight 06/09/2022     Current Outpatient Medications   Medication Sig Dispense Refill    Prenatal MV-Min-Fe Fum-FA-DHA (PRENATAL 1 PO) Take 1 Tablet by mouth every day.       No current facility-administered medications for this visit.     Allergies as of 09/18/2023    (No Known Allergies)      ROS:  See HPI    /78 (BP Location: Left arm, Patient Position: Sitting, BP Cuff Size: Adult)   Pulse 100   Temp 36.7 °C (98 °F) (Temporal)   Ht 1.499 m (4' 11\")   Wt 85.3 kg (188 lb)   SpO2 97%   BMI 37.97 kg/m²     Physical Exam:  General: Normal appearing. No distress.  HEENT: Normocephalic. Eyes conjunctiva clear lids without ptosis, pupils equal and reactive to light accommodation, ears normal shape and contour, canals are clear bilaterally, tympanic membranes are benign, nasal mucosa benign, oropharynx is without erythema, edema or exudates. Sinuses (frontal and " maxillary) nontender to palpation.  Pulmonary: Cough. Clear to ausculation.  Normal effort. No rales, rhonchi, or wheezing.  Cardiovascular: Regular rate and rhythm without murmur. Carotid and radial pulses are intact and equal bilaterally.  Neurologic: Grossly nonfocal.  Lymph: No cervical, supraclavicular or axillary lymph nodes are palpable.  Skin: Warm and dry.  No obvious lesions.  Musculoskeletal: Normal gait. No extremity cyanosis, clubbing, or edema.  Psych: Normal mood and affect. Alert and oriented x3. Judgment and insight is normal.     Assessment and Plan.   28 y.o. female with the following issues:  1. Acute cough  New to examiner, ongoing for the patient for the past 8 days. Due to patient being currently pregnant, recommend Flonase and nasal saline sprays as recommended by OB/GYN. Continue teas, humidifiers, tylenol as needed. Keep follow up with OB/GYN next week. Be seen in ER if symptoms worsen.    Return if symptoms worsen or fail to improve.     Please note that this dictation was created using voice recognition software. I have worked with consultants from the vendor as well as technical experts from [facility] to optimize the interface. I have made every reasonable attempt to correct obvious errors, but I expect that there are errors of grammar and possibly content that I did not discover before finalizing the note.

## 2023-10-11 ENCOUNTER — HOSPITAL ENCOUNTER (OUTPATIENT)
Dept: RADIOLOGY | Facility: MEDICAL CENTER | Age: 28
End: 2023-10-11
Attending: STUDENT IN AN ORGANIZED HEALTH CARE EDUCATION/TRAINING PROGRAM
Payer: COMMERCIAL

## 2023-10-11 DIAGNOSIS — Z34.82 ENCOUNTER FOR SUPERVISION OF OTHER NORMAL PREGNANCY, SECOND TRIMESTER: ICD-10-CM

## 2023-10-11 PROCEDURE — 76815 OB US LIMITED FETUS(S): CPT

## 2023-10-16 ENCOUNTER — ROUTINE PRENATAL (OUTPATIENT)
Dept: OBGYN | Facility: CLINIC | Age: 28
End: 2023-10-16
Payer: COMMERCIAL

## 2023-10-16 VITALS — BODY MASS INDEX: 37.97 KG/M2 | SYSTOLIC BLOOD PRESSURE: 108 MMHG | DIASTOLIC BLOOD PRESSURE: 70 MMHG | WEIGHT: 188 LBS

## 2023-10-16 DIAGNOSIS — Z34.83 ENCOUNTER FOR SUPERVISION OF OTHER NORMAL PREGNANCY, THIRD TRIMESTER: ICD-10-CM

## 2023-10-16 PROCEDURE — 90686 IIV4 VACC NO PRSV 0.5 ML IM: CPT | Performed by: OBSTETRICS & GYNECOLOGY

## 2023-10-16 PROCEDURE — 3078F DIAST BP <80 MM HG: CPT | Performed by: OBSTETRICS & GYNECOLOGY

## 2023-10-16 PROCEDURE — 90471 IMMUNIZATION ADMIN: CPT | Performed by: OBSTETRICS & GYNECOLOGY

## 2023-10-16 PROCEDURE — 0502F SUBSEQUENT PRENATAL CARE: CPT | Performed by: OBSTETRICS & GYNECOLOGY

## 2023-10-16 PROCEDURE — 3074F SYST BP LT 130 MM HG: CPT | Performed by: OBSTETRICS & GYNECOLOGY

## 2023-11-01 ENCOUNTER — TELEPHONE (OUTPATIENT)
Dept: OBGYN | Facility: CLINIC | Age: 28
End: 2023-11-01
Payer: COMMERCIAL

## 2023-11-03 ENCOUNTER — TELEPHONE (OUTPATIENT)
Dept: OBGYN | Facility: CLINIC | Age: 28
End: 2023-11-03
Payer: COMMERCIAL

## 2023-11-03 DIAGNOSIS — Z34.83 ENCOUNTER FOR SUPERVISION OF OTHER NORMAL PREGNANCY, THIRD TRIMESTER: ICD-10-CM

## 2023-11-03 RX ORDER — BREAST PUMP
EACH MISCELLANEOUS
Qty: 1 EACH | Refills: 0 | Status: SHIPPED | OUTPATIENT
Start: 2023-11-03 | End: 2024-02-14 | Stop reason: CLARIF

## 2023-11-03 NOTE — TELEPHONE ENCOUNTER
[facility] called requesting Breast pump script for pt. Printed and faxed over. No further questions.

## 2023-11-13 ENCOUNTER — TELEPHONE (OUTPATIENT)
Dept: OBGYN | Facility: CLINIC | Age: 28
End: 2023-11-13

## 2023-11-13 ENCOUNTER — APPOINTMENT (OUTPATIENT)
Dept: OBGYN | Facility: CLINIC | Age: 28
End: 2023-11-13
Payer: COMMERCIAL

## 2023-11-13 ENCOUNTER — HOSPITAL ENCOUNTER (OUTPATIENT)
Dept: LAB | Facility: MEDICAL CENTER | Age: 28
End: 2023-11-13
Attending: OBSTETRICS & GYNECOLOGY
Payer: COMMERCIAL

## 2023-11-13 ENCOUNTER — HOSPITAL ENCOUNTER (EMERGENCY)
Facility: MEDICAL CENTER | Age: 28
End: 2023-11-13
Attending: STUDENT IN AN ORGANIZED HEALTH CARE EDUCATION/TRAINING PROGRAM | Admitting: STUDENT IN AN ORGANIZED HEALTH CARE EDUCATION/TRAINING PROGRAM
Payer: COMMERCIAL

## 2023-11-13 VITALS
SYSTOLIC BLOOD PRESSURE: 128 MMHG | DIASTOLIC BLOOD PRESSURE: 78 MMHG | OXYGEN SATURATION: 97 % | WEIGHT: 188 LBS | BODY MASS INDEX: 37.9 KG/M2 | HEART RATE: 100 BPM | RESPIRATION RATE: 18 BRPM | TEMPERATURE: 97.5 F | HEIGHT: 59 IN

## 2023-11-13 DIAGNOSIS — R11.0 NAUSEA: ICD-10-CM

## 2023-11-13 DIAGNOSIS — Z34.83 ENCOUNTER FOR SUPERVISION OF OTHER NORMAL PREGNANCY, THIRD TRIMESTER: ICD-10-CM

## 2023-11-13 LAB
ALBUMIN SERPL BCP-MCNC: 4 G/DL (ref 3.2–4.9)
ALBUMIN/GLOB SERPL: 1.1 G/DL
ALP SERPL-CCNC: 160 U/L (ref 30–99)
ALT SERPL-CCNC: 15 U/L (ref 2–50)
ANION GAP SERPL CALC-SCNC: 16 MMOL/L (ref 7–16)
APPEARANCE UR: CLEAR
APPEARANCE UR: CLEAR
AST SERPL-CCNC: 19 U/L (ref 12–45)
BASOPHILS # BLD AUTO: 0.2 % (ref 0–1.8)
BASOPHILS # BLD: 0.03 K/UL (ref 0–0.12)
BILIRUB SERPL-MCNC: 0.7 MG/DL (ref 0.1–1.5)
BUN SERPL-MCNC: 8 MG/DL (ref 8–22)
CALCIUM ALBUM COR SERPL-MCNC: 9.4 MG/DL (ref 8.5–10.5)
CALCIUM SERPL-MCNC: 9.4 MG/DL (ref 8.5–10.5)
CHLORIDE SERPL-SCNC: 103 MMOL/L (ref 96–112)
CO2 SERPL-SCNC: 16 MMOL/L (ref 20–33)
COLOR UR AUTO: YELLOW
COLOR UR AUTO: YELLOW
CREAT SERPL-MCNC: 0.54 MG/DL (ref 0.5–1.4)
EOSINOPHIL # BLD AUTO: 0.02 K/UL (ref 0–0.51)
EOSINOPHIL NFR BLD: 0.1 % (ref 0–6.9)
ERYTHROCYTE [DISTWIDTH] IN BLOOD BY AUTOMATED COUNT: 43.4 FL (ref 35.9–50)
ERYTHROCYTE [DISTWIDTH] IN BLOOD BY AUTOMATED COUNT: 45.2 FL (ref 35.9–50)
GFR SERPLBLD CREATININE-BSD FMLA CKD-EPI: 128 ML/MIN/1.73 M 2
GLOBULIN SER CALC-MCNC: 3.8 G/DL (ref 1.9–3.5)
GLUCOSE 1H P 50 G GLC PO SERPL-MCNC: 109 MG/DL (ref 70–139)
GLUCOSE SERPL-MCNC: 87 MG/DL (ref 65–99)
GLUCOSE UR QL STRIP.AUTO: NEGATIVE MG/DL
GLUCOSE UR QL STRIP.AUTO: NEGATIVE MG/DL
HCT VFR BLD AUTO: 44.7 % (ref 37–47)
HCT VFR BLD AUTO: 44.8 % (ref 37–47)
HGB BLD-MCNC: 14.9 G/DL (ref 12–16)
HGB BLD-MCNC: 15.5 G/DL (ref 12–16)
IMM GRANULOCYTES # BLD AUTO: 0.09 K/UL (ref 0–0.11)
IMM GRANULOCYTES NFR BLD AUTO: 0.6 % (ref 0–0.9)
KETONES UR QL STRIP.AUTO: >=160 MG/DL
KETONES UR QL STRIP.AUTO: NEGATIVE MG/DL
LEUKOCYTE ESTERASE UR QL STRIP.AUTO: ABNORMAL
LEUKOCYTE ESTERASE UR QL STRIP.AUTO: ABNORMAL
LYMPHOCYTES # BLD AUTO: 1.17 K/UL (ref 1–4.8)
LYMPHOCYTES NFR BLD: 7.2 % (ref 22–41)
MCH RBC QN AUTO: 31.2 PG (ref 27–33)
MCH RBC QN AUTO: 31.2 PG (ref 27–33)
MCHC RBC AUTO-ENTMCNC: 33.3 G/DL (ref 32.2–35.5)
MCHC RBC AUTO-ENTMCNC: 34.7 G/DL (ref 32.2–35.5)
MCV RBC AUTO: 89.9 FL (ref 81.4–97.8)
MCV RBC AUTO: 93.9 FL (ref 81.4–97.8)
MONOCYTES # BLD AUTO: 0.69 K/UL (ref 0–0.85)
MONOCYTES NFR BLD AUTO: 4.2 % (ref 0–13.4)
NEUTROPHILS # BLD AUTO: 14.27 K/UL (ref 1.82–7.42)
NEUTROPHILS NFR BLD: 87.7 % (ref 44–72)
NITRITE UR QL STRIP.AUTO: NEGATIVE
NITRITE UR QL STRIP.AUTO: NEGATIVE
NRBC # BLD AUTO: 0 K/UL
NRBC BLD-RTO: 0 /100 WBC (ref 0–0.2)
PH UR STRIP.AUTO: 6 [PH] (ref 5–8)
PH UR STRIP.AUTO: 6 [PH] (ref 5–8)
PLATELET # BLD AUTO: 305 K/UL (ref 164–446)
PLATELET # BLD AUTO: 306 K/UL (ref 164–446)
PMV BLD AUTO: 10.6 FL (ref 9–12.9)
PMV BLD AUTO: 10.8 FL (ref 9–12.9)
POTASSIUM SERPL-SCNC: 3.7 MMOL/L (ref 3.6–5.5)
PROT SERPL-MCNC: 7.8 G/DL (ref 6–8.2)
PROT UR QL STRIP: 100 MG/DL
PROT UR QL STRIP: NEGATIVE MG/DL
RBC # BLD AUTO: 4.77 M/UL (ref 4.2–5.4)
RBC # BLD AUTO: 4.97 M/UL (ref 4.2–5.4)
RBC UR QL AUTO: NEGATIVE
RBC UR QL AUTO: NEGATIVE
SODIUM SERPL-SCNC: 135 MMOL/L (ref 135–145)
SP GR UR STRIP.AUTO: 1.01 (ref 1–1.03)
SP GR UR STRIP.AUTO: >=1.03 (ref 1–1.03)
T PALLIDUM AB SER QL IA: NORMAL
WBC # BLD AUTO: 13.2 K/UL (ref 4.8–10.8)
WBC # BLD AUTO: 16.3 K/UL (ref 4.8–10.8)

## 2023-11-13 PROCEDURE — 700111 HCHG RX REV CODE 636 W/ 250 OVERRIDE (IP): Mod: JZ | Performed by: STUDENT IN AN ORGANIZED HEALTH CARE EDUCATION/TRAINING PROGRAM

## 2023-11-13 PROCEDURE — 96374 THER/PROPH/DIAG INJ IV PUSH: CPT

## 2023-11-13 PROCEDURE — 86780 TREPONEMA PALLIDUM: CPT

## 2023-11-13 PROCEDURE — 36415 COLL VENOUS BLD VENIPUNCTURE: CPT

## 2023-11-13 PROCEDURE — 700105 HCHG RX REV CODE 258: Performed by: STUDENT IN AN ORGANIZED HEALTH CARE EDUCATION/TRAINING PROGRAM

## 2023-11-13 PROCEDURE — 80053 COMPREHEN METABOLIC PANEL: CPT

## 2023-11-13 PROCEDURE — 85027 COMPLETE CBC AUTOMATED: CPT

## 2023-11-13 PROCEDURE — 85025 COMPLETE CBC W/AUTO DIFF WBC: CPT

## 2023-11-13 PROCEDURE — 81002 URINALYSIS NONAUTO W/O SCOPE: CPT

## 2023-11-13 PROCEDURE — 99282 EMERGENCY DEPT VISIT SF MDM: CPT

## 2023-11-13 PROCEDURE — 96375 TX/PRO/DX INJ NEW DRUG ADDON: CPT

## 2023-11-13 PROCEDURE — 59025 FETAL NON-STRESS TEST: CPT

## 2023-11-13 PROCEDURE — 82950 GLUCOSE TEST: CPT

## 2023-11-13 RX ORDER — ONDANSETRON 2 MG/ML
4 INJECTION INTRAMUSCULAR; INTRAVENOUS ONCE
Status: COMPLETED | OUTPATIENT
Start: 2023-11-13 | End: 2023-11-13

## 2023-11-13 RX ORDER — SODIUM CHLORIDE, SODIUM LACTATE, POTASSIUM CHLORIDE, AND CALCIUM CHLORIDE .6; .31; .03; .02 G/100ML; G/100ML; G/100ML; G/100ML
1000 INJECTION, SOLUTION INTRAVENOUS ONCE
Status: COMPLETED | OUTPATIENT
Start: 2023-11-13 | End: 2023-11-13

## 2023-11-13 RX ORDER — ONDANSETRON 4 MG/1
4 TABLET, ORALLY DISINTEGRATING ORAL EVERY 6 HOURS PRN
Qty: 60 TABLET | Refills: 2 | Status: ON HOLD | OUTPATIENT
Start: 2023-11-13 | End: 2023-12-27

## 2023-11-13 RX ADMIN — ONDANSETRON 4 MG: 2 INJECTION INTRAMUSCULAR; INTRAVENOUS at 19:02

## 2023-11-13 RX ADMIN — FAMOTIDINE 20 MG: 10 INJECTION, SOLUTION INTRAVENOUS at 19:02

## 2023-11-13 RX ADMIN — SODIUM CHLORIDE, POTASSIUM CHLORIDE, SODIUM LACTATE AND CALCIUM CHLORIDE 1000 ML: 600; 310; 30; 20 INJECTION, SOLUTION INTRAVENOUS at 19:02

## 2023-11-13 NOTE — TELEPHONE ENCOUNTER
Pt called stating is having N/V and upper abdominal pain and tightness. Advised pt to take half a tablet of Unisom with vitamin B6 at night to help with this for the next day. Informed pt for the active nausea she is having to drink ginger ale drinks. Informed pt that N/V does occur during the 2nd/3rd trimester due to hormones. And that tightening of the abdomin is normal as well due to baby, abdominal right ligament stretching. Pt denies any VB/LOF Pt understood and has no further questions

## 2023-11-14 NOTE — ED PROVIDER NOTES
OB ED EVALUATION NOTE    SUBJECTIVE:  Mary Jane Prater is a 28 y.o.,  at 30w5d who presents for evaluation of nausea and vomiting. She denies vaginal bleeding, leakage of fluid, or contractions. She states the baby is moving.     She reports that her symptoms have been significant today. She has had intermittent nausea throughout pregnancy and started vomiting about 1 week ago, but reports one episode per day previously. Since this morning, she has not been able to tolerate PO intake and has had several episodes of emesis. She has not tried any anti-emetics yet. Notes bilateral upper abdominal pain which started after vomiting several times. She has had one episode of loose stools this morning. Denies similar symptoms in close contacts and denies new or suspicious foods.    She reports increased heartburn for the past 3 weeks. Denies periumbilical or lower abdominal pain. Denies fevers, chills, new H/A, vision changes, CP, dyspnea, dysuria, or significant peripheral edema.     She denies complications during this pregnancy    I personally reviewed the past medical and surgical histories, as well as the problem list.    Denies chronic medical problems  Meds: PNV  NKDA  Surgical history of cholecystectomy    Patient Active Problem List   Diagnosis    Encounter to establish care with new doctor    Overweight    Dyspareunia, female    Hordeolum externum of left upper eyelid    Herpes    Acute cough         OBJECTIVE:  Vital Signs:   Vitals:    23 1607   BP: 128/78   Pulse: 100   Resp: 18   Temp: 36.4 °C (97.5 °F)   SpO2: 97%     GEN: NAD  CV: RRR, nl S1 and S2 with systolic flow murmur  Resp: Unlabored, symmetric chest rise, CTAB  Abd: soft, NT throughout, gravid, no guarding  MSK: No CVA tenderness  Ext: no edema    NST read: Interrupted tracing but in contiguous segment baseline 140/moderate variability/accelerations present/no decels noted  [place]: irritability    LABS / IMAGING:  Results for orders  placed or performed during the hospital encounter of 23   POCT urinalysis device results   Result Value Ref Range    POC Color Yellow     POC Appearance Clear     POC Glucose Negative Negative mg/dL    POC Ketones Negative Negative mg/dL    POC Specific Gravity 1.015 1.005 - 1.030    POC Blood Negative Negative    POC Urine PH 6.0 5.0 - 8.0    POC Protein Negative Negative mg/dL    POC Nitrites Negative Negative    POC Leukocyte Esterase Small (A) Negative   POCT urinalysis device results   Result Value Ref Range    POC Color Yellow     POC Appearance Clear     POC Glucose Negative Negative mg/dL    POC Ketones >=160 (A) Negative mg/dL    POC Specific Gravity >=1.030 (A) 1.005 - 1.030    POC Blood Negative Negative    POC Urine PH 6.0 5.0 - 8.0    POC Protein 100 (A) Negative mg/dL    POC Nitrites Negative Negative    POC Leukocyte Esterase Trace (A) Negative         ASSESSMENT AND PLAN:  28 y.o.  at 30w5d presenting for evaluation of nausea and vomiting with significant symptoms since this morning. She has had increased heartburn, nausea, and one episode of daily emesis over the past weeks. Notes 1 episode of diarrhea this morning. Abdominal pain onset after repetitive episodes of emesis in epigastric region. No signs of labor. History and exam not c/w acute abdomen. I think most likely nature of symptoms is viral gastroenteritis.     Nausea and emesis  Mild dehydration  -Insert IV  -LR 1,000 mL   -Ondansetron IV  -Famotidine IV  -CBC and CMP    We will treat symptoms as above and continue to monitor FHT and tocometry. Reassess after initial treatment and lab evaluation.    Eliza Herrera M.D.

## 2023-11-14 NOTE — PROGRESS NOTES
1900 - Report received from [facility]. POC discussed, assumed care     1905 - This RN to bedside. Patient states she is doing much better at this time. Patient given ice water, sprite and saltines to attempt PO challenge.     2030 - This RN to bedside. Discharge instructions, PTL precautions, FKC, follow up and when to return reviewed with pt and family. All questions answered, no further needs stated at this time.    2035 - Pt and FOB ambulate off unit instable condition.

## 2023-11-14 NOTE — PROGRESS NOTES
"1607: Pt is a  at 30.5 weeks today, pt presents to L&D with c/o nausea/vomiting x4 today. Pt states \"I usually feel better after I throw up one time\". Pt also c/o abdominal pain/cramping all over abdomen that started today.  Pt reports +FM, denies VB/LOF at this time. EFM and TOCO applied, VS taken, pt comfortable in bed at this time.      - Dr. Herrera at bedside to evaluate patient, orders received for LR bolus, labs, pepcid & zofran.     - Report given to [facility], care transferred.   "

## 2023-11-30 ENCOUNTER — HOSPITAL ENCOUNTER (EMERGENCY)
Facility: MEDICAL CENTER | Age: 28
End: 2023-11-30
Attending: OBSTETRICS & GYNECOLOGY | Admitting: OBSTETRICS & GYNECOLOGY
Payer: COMMERCIAL

## 2023-11-30 ENCOUNTER — ROUTINE PRENATAL (OUTPATIENT)
Dept: OBGYN | Facility: CLINIC | Age: 28
End: 2023-11-30
Payer: COMMERCIAL

## 2023-11-30 VITALS
OXYGEN SATURATION: 100 % | HEIGHT: 59 IN | HEART RATE: 68 BPM | RESPIRATION RATE: 16 BRPM | BODY MASS INDEX: 39.92 KG/M2 | TEMPERATURE: 98 F | WEIGHT: 198 LBS | SYSTOLIC BLOOD PRESSURE: 139 MMHG | DIASTOLIC BLOOD PRESSURE: 86 MMHG

## 2023-11-30 VITALS — SYSTOLIC BLOOD PRESSURE: 142 MMHG | BODY MASS INDEX: 39.99 KG/M2 | DIASTOLIC BLOOD PRESSURE: 95 MMHG | WEIGHT: 198 LBS

## 2023-11-30 DIAGNOSIS — E66.9 OBESITY (BMI 35.0-39.9 WITHOUT COMORBIDITY): ICD-10-CM

## 2023-11-30 DIAGNOSIS — Z13.29 SCREENING FOR THYROID DISORDER: ICD-10-CM

## 2023-11-30 DIAGNOSIS — Z3A.33 33 WEEKS GESTATION OF PREGNANCY: ICD-10-CM

## 2023-11-30 DIAGNOSIS — B00.2 ORAL HERPES SIMPLEX, NOT CURRENTLY ACTIVE: ICD-10-CM

## 2023-11-30 DIAGNOSIS — Z23 NEED FOR DIPHTHERIA-TETANUS-PERTUSSIS (TDAP) VACCINE: ICD-10-CM

## 2023-11-30 PROBLEM — Z76.89 ENCOUNTER TO ESTABLISH CARE WITH NEW DOCTOR: Status: RESOLVED | Noted: 2022-06-09 | Resolved: 2023-11-30

## 2023-11-30 PROBLEM — H00.014 HORDEOLUM EXTERNUM OF LEFT UPPER EYELID: Status: RESOLVED | Noted: 2022-11-15 | Resolved: 2023-11-30

## 2023-11-30 PROBLEM — E66.3 OVERWEIGHT: Status: RESOLVED | Noted: 2022-06-09 | Resolved: 2023-11-30

## 2023-11-30 PROBLEM — R05.1 ACUTE COUGH: Status: RESOLVED | Noted: 2023-09-18 | Resolved: 2023-11-30

## 2023-11-30 LAB
ALBUMIN SERPL BCP-MCNC: 3.4 G/DL (ref 3.2–4.9)
ALBUMIN/GLOB SERPL: 0.9 G/DL
ALP SERPL-CCNC: 186 U/L (ref 30–99)
ALT SERPL-CCNC: 14 U/L (ref 2–50)
ANION GAP SERPL CALC-SCNC: 10 MMOL/L (ref 7–16)
APPEARANCE UR: CLEAR
APPEARANCE UR: CLEAR
AST SERPL-CCNC: 15 U/L (ref 12–45)
BILIRUB SERPL-MCNC: 0.3 MG/DL (ref 0.1–1.5)
BILIRUB UR STRIP-MCNC: NORMAL MG/DL
BUN SERPL-MCNC: 10 MG/DL (ref 8–22)
CALCIUM ALBUM COR SERPL-MCNC: 9.6 MG/DL (ref 8.5–10.5)
CALCIUM SERPL-MCNC: 9.1 MG/DL (ref 8.5–10.5)
CHLORIDE SERPL-SCNC: 104 MMOL/L (ref 96–112)
CO2 SERPL-SCNC: 21 MMOL/L (ref 20–33)
COLOR UR AUTO: YELLOW
COLOR UR AUTO: YELLOW
CREAT SERPL-MCNC: 0.59 MG/DL (ref 0.5–1.4)
CREAT UR-MCNC: 64.65 MG/DL
ERYTHROCYTE [DISTWIDTH] IN BLOOD BY AUTOMATED COUNT: 41.8 FL (ref 35.9–50)
GFR SERPLBLD CREATININE-BSD FMLA CKD-EPI: 125 ML/MIN/1.73 M 2
GLOBULIN SER CALC-MCNC: 3.6 G/DL (ref 1.9–3.5)
GLUCOSE SERPL-MCNC: 84 MG/DL (ref 65–99)
GLUCOSE UR QL STRIP.AUTO: NEGATIVE MG/DL
GLUCOSE UR STRIP.AUTO-MCNC: NORMAL MG/DL
HCT VFR BLD AUTO: 40.7 % (ref 37–47)
HGB BLD-MCNC: 14 G/DL (ref 12–16)
KETONES UR QL STRIP.AUTO: NEGATIVE MG/DL
KETONES UR STRIP.AUTO-MCNC: NORMAL MG/DL
LEUKOCYTE ESTERASE UR QL STRIP.AUTO: ABNORMAL
LEUKOCYTE ESTERASE UR QL STRIP.AUTO: NORMAL
MCH RBC QN AUTO: 31.1 PG (ref 27–33)
MCHC RBC AUTO-ENTMCNC: 34.4 G/DL (ref 32.2–35.5)
MCV RBC AUTO: 90.4 FL (ref 81.4–97.8)
NITRITE UR QL STRIP.AUTO: NEGATIVE
NITRITE UR QL STRIP.AUTO: NORMAL
PH UR STRIP.AUTO: 7 [PH] (ref 5–8)
PH UR STRIP.AUTO: 7 [PH] (ref 5–8)
PLATELET # BLD AUTO: 265 K/UL (ref 164–446)
PMV BLD AUTO: 10.6 FL (ref 9–12.9)
POTASSIUM SERPL-SCNC: 4.1 MMOL/L (ref 3.6–5.5)
PROT SERPL-MCNC: 7 G/DL (ref 6–8.2)
PROT UR QL STRIP: 1 MG/DL
PROT UR QL STRIP: NEGATIVE MG/DL
PROT UR-MCNC: 12 MG/DL (ref 0–15)
PROT/CREAT UR: 186 MG/G (ref 10–107)
RBC # BLD AUTO: 4.5 M/UL (ref 4.2–5.4)
RBC UR QL AUTO: NEGATIVE
RBC UR QL AUTO: NORMAL
SODIUM SERPL-SCNC: 135 MMOL/L (ref 135–145)
SP GR UR STRIP.AUTO: 1.02
SP GR UR STRIP.AUTO: 1.02 (ref 1–1.03)
UROBILINOGEN UR STRIP-MCNC: 0.2 MG/DL
WBC # BLD AUTO: 9.8 K/UL (ref 4.8–10.8)

## 2023-11-30 PROCEDURE — 3077F SYST BP >= 140 MM HG: CPT | Performed by: FAMILY MEDICINE

## 2023-11-30 PROCEDURE — 80053 COMPREHEN METABOLIC PANEL: CPT

## 2023-11-30 PROCEDURE — 99283 EMERGENCY DEPT VISIT LOW MDM: CPT

## 2023-11-30 PROCEDURE — 90715 TDAP VACCINE 7 YRS/> IM: CPT | Performed by: FAMILY MEDICINE

## 2023-11-30 PROCEDURE — 99283 EMERGENCY DEPT VISIT LOW MDM: CPT | Mod: 25,GC | Performed by: OBSTETRICS & GYNECOLOGY

## 2023-11-30 PROCEDURE — 90471 IMMUNIZATION ADMIN: CPT | Performed by: FAMILY MEDICINE

## 2023-11-30 PROCEDURE — 81002 URINALYSIS NONAUTO W/O SCOPE: CPT | Performed by: FAMILY MEDICINE

## 2023-11-30 PROCEDURE — 36415 COLL VENOUS BLD VENIPUNCTURE: CPT

## 2023-11-30 PROCEDURE — 59025 FETAL NON-STRESS TEST: CPT | Mod: 26 | Performed by: OBSTETRICS & GYNECOLOGY

## 2023-11-30 PROCEDURE — 82570 ASSAY OF URINE CREATININE: CPT

## 2023-11-30 PROCEDURE — 0502F SUBSEQUENT PRENATAL CARE: CPT | Performed by: FAMILY MEDICINE

## 2023-11-30 PROCEDURE — 59025 FETAL NON-STRESS TEST: CPT

## 2023-11-30 PROCEDURE — 85027 COMPLETE CBC AUTOMATED: CPT

## 2023-11-30 PROCEDURE — 81002 URINALYSIS NONAUTO W/O SCOPE: CPT

## 2023-11-30 PROCEDURE — 3080F DIAST BP >= 90 MM HG: CPT | Performed by: FAMILY MEDICINE

## 2023-11-30 PROCEDURE — 84156 ASSAY OF PROTEIN URINE: CPT

## 2023-11-30 PROCEDURE — 59025 FETAL NON-STRESS TEST: CPT | Performed by: FAMILY MEDICINE

## 2023-11-30 ASSESSMENT — PAIN SCALES - GENERAL: PAINLEVEL: 0 - NO PAIN

## 2023-11-30 ASSESSMENT — FIBROSIS 4 INDEX
FIB4 SCORE: 0.45
FIB4 SCORE: 0.45

## 2023-11-30 NOTE — ED PROVIDER NOTES
LABOR AND DELIVERY TRIAGE NOTE    PATIENT ID:  NAME:  Mary Jane Prater  MRN:               [id number]  YOB: 1995     28 y.o. female  at 33w1d.    Subjective: Pt sent from outpatient OB visit for Doppler fetal heart rate was greater than 180 for 1 minute along with elevated blood pressures in the 140s systolic.  NST was then performed and fetal heart rate was 180s to 190s with occasional dips into the 140s to 150s.  The patient denies illness. She does report she has been anxious recently with a board exam this weekend and had her braces tightened this morning which is causing her pain. She also endorses inadequate fluid intake today as she has been to multiple appointments. She denies HA, vision changes, CP, SOB, abd pain, dysuria, diarrhea, n/v, edema or rashes. Additionally she denies contractions, LOF, vaginal bleeding and reports good fetal movement.       negative for contractions  negative pain   negative for LOF  negative for vaginal bleeding  positive for fetal movement    PNL:  Blood Type O+, Rubella immune, HIV neg, TrepAb neg, HBsAg NR, GC/CT neg/neg  1 HR GTT: 109  GBS not yet performed    ROS: Patient denies any fever chills, nausea, vomiting, headache, chest pain, shortness of breath, or dysuria or unusual swelling of hands or feet.     PMHx:   No past medical history on file.     OB History    Para Term  AB Living   2       1     SAB IAB Ectopic Molar Multiple Live Births   1                # Outcome Date GA Lbr Scooby/2nd Weight Sex Delivery Anes PTL Lv   2 Current            1 SAB      SAB          PSHx:  Past Surgical History:   Procedure Laterality Date    WILBUR BY LAPAROSCOPY  2018    Procedure: WILBUR BY LAPAROSCOPY;  Surgeon: Kwesi Clay M.D.;  Location: SURGERY [facility];  Service: General       Social Hx:  Social History     Tobacco Use    Smoking status: Never    Smokeless tobacco: Never   Vaping Use    Vaping Use: Never used    Substance Use Topics    Alcohol use: Not Currently    Drug use: No         Medications:  No current facility-administered medications on file prior to encounter.     Current Outpatient Medications on File Prior to Encounter   Medication Sig Dispense Refill    ondansetron (ZOFRAN ODT) 4 MG TABLET DISPERSIBLE Take 1 Tablet by mouth every 6 hours as needed for Nausea/Vomiting. 60 Tablet 2    Misc. Devices (BREAST PUMP) Misc As needed for breastfeeding or milk storage 1 Each 0    Prenatal MV-Min-Fe Fum-FA-DHA (PRENATAL 1 PO) Take 1 Tablet by mouth every day.         Allergies:  NKDA      Objective:    Vitals:    23 1630 23 1640 23 1650 23 1710   BP: 139/85 128/84 (!) 140/89 139/86   Pulse: 68 60 69 68   Resp:       Temp:       TempSrc:       SpO2:       Weight:       Height:         Temp (24hrs), Av.7 °C (98 °F), Min:36.7 °C (98 °F), Max:36.7 °C (98 °F)    General: No acute distress, resting comfortably in bed.  HEENT: normocephalic, nontraumatic, EOMI  Cardiovascular: Heart RRR with no murmurs, rubs or gallops.  Radial pulses 2+  Respiratory: symmetric chest expansion, lungs CTAB, with no wheezes, rales, rhonci on anterior auscultation  Abdomen: gravid, nontender  Musculoskeletal: RANDOLPH spontaneously, trace edema of the lower extremities to the ankles that is nonpitting  Neuro: non focal with no numbness, tingling or changes in sensation    [place]: Irritability  FHRM: Baseline 135, moderate variability, + accels, no decels    Labs:    Latest Reference Range & Units 23 13:33   POC Color  Yellow   POC Appearance  Clear   POC Specific Gravity 1.005 - 1.030  1.020   POC Urine PH 5.0 - 8.0  7.0   POC Glucose Negative mg/dL Negative   POC Ketones Negative mg/dL Negative   POC Protein Negative mg/dL Negative   POC Nitrites Negative  Negative   POC Leukocyte Esterase Negative  Small !   POC Blood Negative  Negative   !: Data is abnormal  Recent Labs     23  1335   WBC 9.8   RBC 4.50    HEMOGLOBIN 14.0   HEMATOCRIT 40.7   MCV 90.4   MCH 31.1   RDW 41.8   PLATELETCT 265   MPV 10.6      Latest Reference Range & Units 23 13:35   Sodium 135 - 145 mmol/L 135   Potassium 3.6 - 5.5 mmol/L 4.1   Chloride 96 - 112 mmol/L 104   Co2 20 - 33 mmol/L 21   Anion Gap 7.0 - 16.0  10.0   Glucose 65 - 99 mg/dL 84   Bun 8 - 22 mg/dL 10   Creatinine 0.50 - 1.40 mg/dL 0.59   GFR (CKD-EPI) >60 mL/min/1.73 m 2 125   Calcium 8.5 - 10.5 mg/dL 9.1   Correct Calcium 8.5 - 10.5 mg/dL 9.6   AST(SGOT) 12 - 45 U/L 15   ALT(SGPT) 2 - 50 U/L 14   Alkaline Phosphatase 30 - 99 U/L 186 (H)   Total Bilirubin 0.1 - 1.5 mg/dL 0.3   Albumin 3.2 - 4.9 g/dL 3.4   Total Protein 6.0 - 8.2 g/dL 7.0   Globulin 1.9 - 3.5 g/dL 3.6 (H)   A-G Ratio g/dL 0.9   (H): Data is abnormally high    Assessment: 28 y.o. female  at 33w1d presents after visit showed fetal tachycardia with rates in the 180s to 190s maternal hypertension.  Upon presentation to obstetrics triage fetal heart tracing baseline rate of 135 with moderate variability and positive accelerations. The mothers BP continued to be elevated with systolics 130-150s and diastolics in the 90s. PC ratio is 186, the CMP and CBC were reassuring.     #SIUP @ 33w1d   -Cont. Prenatal care with [facility]   -Continue prenatal vitamins  -GBS at upcoming appointment    #elevated blood pressures during pregnancy   Upon presentation patient had systolic blood pressures up to 150s, have seen them trend down with every 15 minute BP checks, but patient still having mildly elevated blood pressures.  H workup ordered revealing within normal limits platelets, liver enzymes, renal function and PC ratio 26.  Discussed with patient return precautions including persistently elevated blood pressures of systolic greater than 150 and diastolic greater than 100.  Also discussed were return precautions for headache, vision changes, loss of fluids or vaginal bleeding, right upper quadrant pain fever.  Patient  demonstrated understanding and reports that she already has a blood pressure cuff.  Also discussed with patient the importance of follow-up blood pressure check in office tomorrow and Monday of next week.  Patient again demonstrated understanding and questions were answered to her satisfaction  -CBC, CMP, PC ratio ordered and evaluated  -Blood pressure check tomorrow in office  -At home blood pressure checks   -Return precautions for SBP>150 & DBP>100  -Follow-up next week for continued blood pressure monitoring    #Fetal status   - FHT: cat 1    #GBS status  -GBS: Should be gathered next week     #Rubella immune, HIV neg, TrepAb neg, HBsAg NR, GC/CT neg/neg    #healthcare maintenance   -TdaP received 11/30/2023  -Flu shot received 10/16/2023  -Recommend RSV vaccination at 32 to 36 weeks      - Patient is cleared to return home with family. Encouraged to see MD/DO for increased painful uterine contractions @ 3-5, vaginal bleeding, loss of fluid, or other serious symptoms.      Discussed case with Dr. Drake, [facility] Attending. Case was discussed and attending agreed with plan prior to discharge of patient.      Devonte Dowd M.D.  PGY-1, [facility]

## 2023-11-30 NOTE — PROGRESS NOTES
[facility]  Prenatal Clinic Note      Mary Jane Prater is a 28 y.o. female  at 33w1d by 5w US who presents for prenatal care.    Subjective      CC: routine prenatal care    HPI: Pt is here for prenatal care. She does not have any concerns or questions at this time. She will get her Tdap today.     Pt had her braces tightened this morning, OK to use acetaminophen for pain. Bp was elevated on arrival, but improved on repeat.     Pt notes that she has oral herpes outbreaks few times per year. Denies ever having genital lesions.    On doppler fetal heart rate was sustained >180 for one minute. Pt denies dehydration, pain, illness, or other concerns. We moved pt to [facility] and FHR was 180s-190s with occasional dips to 140s-150s.    Uterine contractions denies  Leakage of fluid denies  vaginal bleeding denies  fetal movement affirms      Review of Symptoms:  Gen: denies fevers/chills, denies changes in weight  Pulm: denies shortness of breath, denies cough  CV: denies chest pain, denies palpitations  GI: denies nausea, denies vomiting, denies diarrhea or constipation  : denies dysuria, denies vaginal discharge or odor  Skin: denies rash      Histories      Prenatal care with [facility] starting at 5w with following problems:  Patient Active Problem List    Diagnosis Date Noted    Herpes 2022    Dyspareunia, female 11/15/2022       No past medical history on file.  Past Surgical History:   Procedure Laterality Date    WILBUR BY LAPAROSCOPY  2018    Procedure: WILBUR BY LAPAROSCOPY;  Surgeon: Kwesi Clay M.D.;  Location: SURGERY [facility];  Service: General     Family History   Problem Relation Age of Onset    No Known Problems Mother     Heart Disease Father     Diabetes Father      OB History    Para Term  AB Living   2       1     SAB IAB Ectopic Molar Multiple Live Births   1                # Outcome Date GA Lbr Scooby/2nd Weight Sex Delivery Anes PTL Lv   2 Current             1 SAB      SAB        Social History     Socioeconomic History    Marital status: Single     Spouse name: Not on file    Number of children: Not on file    Years of education: Not on file    Highest education level: Not on file   Occupational History    Not on file   Tobacco Use    Smoking status: Never    Smokeless tobacco: Never   Vaping Use    Vaping Use: Never used   Substance and Sexual Activity    Alcohol use: Not Currently    Drug use: No    Sexual activity: Yes     Partners: Male     Birth control/protection: None   Other Topics Concern    Not on file   Social History Narrative    Not on file     Social Determinants of Health     Financial Resource Strain: Unknown (6/9/2022)    Overall Financial Resource Strain (CARDIA)     Difficulty of Paying Living Expenses: Patient refused   Food Insecurity: Unknown (6/9/2022)    Hunger Vital Sign     Worried About Running Out of Food in the Last Year: Patient refused     Ran Out of Food in the Last Year: Patient refused   Transportation Needs: Unknown (6/9/2022)    PRAPARE - Transportation     Lack of Transportation (Medical): Patient refused     Lack of Transportation (Non-Medical): Patient refused   Physical Activity: Not on file   Stress: Not on file   Social Connections: Unknown (6/9/2022)    Social Connection and Isolation Panel [NHANES]     Frequency of Communication with Friends and Family: Patient refused     Frequency of Social Gatherings with Friends and Family: Patient refused     Attends Sikhism Services: Patient refused     Active Member of Clubs or Organizations: Patient refused     Attends Club or Organization Meetings: Patient refused     Marital Status: Patient refused   Intimate Partner Violence: Not on file   Housing Stability: Unknown (6/9/2022)    Housing Stability Vital Sign     Unable to Pay for Housing in the Last Year: Patient refused     Number of Places Lived in the Last Year: Not on file     Unstable Housing in the Last Year: Patient  refused     No Known Allergies     Current Outpatient Medications:     ondansetron (ZOFRAN ODT) 4 MG TABLET DISPERSIBLE, Take 1 Tablet by mouth every 6 hours as needed for Nausea/Vomiting., Disp: 60 Tablet, Rfl: 2    Prenatal MV-Min-Fe Fum-FA-DHA (PRENATAL 1 PO), Take 1 Tablet by mouth every day., Disp: , Rfl:     Misc. Devices (BREAST PUMP) Misc, As needed for breastfeeding or milk storage, Disp: 1 Each, Rfl: 0    Objective:      /68   Wt 198 lb   BMI 39.99 kg/m²     Gen: Alert and oriented, No apparent distress.  Lungs: Breathing comfortably on room air, no cough  CV: Extremities are warm and well perfused, no BLE edema  MSK: Normal movement of extremities, gait normal    SVE: deferred    Lab Review  Recent Labs     23  0701 23  1708 23  1128 23  1840   ABOGROUP O  --   --   --    ABSCRN NEG  --   --   --    HEMOGLOBIN 14.1  --    < > 15.5   PLATELETCT 335  --    < > 305   RUBELLAIGG 74.20  --   --   --    HEPBSAG Non-Reactive  --   --   --    HEPCAB Non-Reactive  --   --   --    GCBYDNAPR  --  Negative  --   --     < > = values in this interval not displayed.      Assessment and Plan:     Mary Jane Prater is a 28 y.o. female  at 33w1d by 5w US who presents for prenatal care.    #SIUP at 33w1d by 5w US, SHAUN 2024  - prenatal care with [facility]  - Continue prenatal vitamins  - GBS to be collected next appointment    #obesity  #pre-pregnancy 175lb, BMI 35  - IOM weight gain goal: 11-20lb  - encourage daily exercise at least 30 minutes daily    #elevated BP  - pt had two elevated BP in clinic 149/95 and 149/92  - denies any pre-eclampsia symptoms  - will send to triage for [facility] labs    #pre-eclampsia prophylaxis, was not started  - risk factors: nulliparity, obesity    #fetal tachycardia 180s on doppler  - NST in office with FHR 180s-190s  - mom denies illness, anxiety  - mom denies medications, antihistamines  - maternal H/H 15.5/44.7 on 23  - UA reveals concentrated  urine  - will screen for hyperthyroid  - will send patient to OB triage for prolonged monitoring    #herpes oral    #postpartum contraception: OCP    #Healthcare Maintenance  - Tdap 11/30/2023  - flu 10/16/2023  - COVID vaccines/boosters recommended  - RSV vaccine recommended 32-36wk, go to your pharmacy  - Continue routine dental care twice per year      Return to clinic in 2 weeks for routine prenatal care    Opal Jaimes MD, MPH

## 2023-12-01 ENCOUNTER — NON-PROVIDER VISIT (OUTPATIENT)
Dept: OBGYN | Facility: CLINIC | Age: 28
End: 2023-12-01
Payer: COMMERCIAL

## 2023-12-01 VITALS — DIASTOLIC BLOOD PRESSURE: 70 MMHG | SYSTOLIC BLOOD PRESSURE: 130 MMHG

## 2023-12-01 NOTE — PROGRESS NOTES
1730-discharge order received.   1735-discharge instruction reviewed with pt. Pre E s/s explained to pt. Pt states understanding. Pt will call office to have BP checked tomorrow.   1736-Pt discharged home

## 2023-12-01 NOTE — PROGRESS NOTES
Pt came in for BP Check per Dr. Drake-[facility].  Sent to L&D 11/30/23 per Dr. Jaimes for HTN after OB appt.   Consulted with Dr. Campos-BP stable. Pt reminded of instructions given from L&D is symptoms get worse. Pt denies headache, vision change, and swelling.

## 2023-12-13 ENCOUNTER — APPOINTMENT (OUTPATIENT)
Dept: OBGYN | Facility: CLINIC | Age: 28
End: 2023-12-13
Payer: COMMERCIAL

## 2023-12-21 ENCOUNTER — APPOINTMENT (OUTPATIENT)
Dept: OBGYN | Facility: CLINIC | Age: 28
End: 2023-12-21
Payer: COMMERCIAL

## 2023-12-22 ENCOUNTER — HOSPITAL ENCOUNTER (OUTPATIENT)
Facility: MEDICAL CENTER | Age: 28
End: 2023-12-22
Attending: PHYSICIAN ASSISTANT
Payer: COMMERCIAL

## 2023-12-22 ENCOUNTER — APPOINTMENT (OUTPATIENT)
Dept: RADIOLOGY | Facility: MEDICAL CENTER | Age: 28
End: 2023-12-22
Attending: OBSTETRICS & GYNECOLOGY
Payer: COMMERCIAL

## 2023-12-22 ENCOUNTER — ROUTINE PRENATAL (OUTPATIENT)
Dept: OBGYN | Facility: CLINIC | Age: 28
End: 2023-12-22
Payer: COMMERCIAL

## 2023-12-22 ENCOUNTER — HOSPITAL ENCOUNTER (INPATIENT)
Facility: MEDICAL CENTER | Age: 28
LOS: 5 days | End: 2023-12-27
Attending: OBSTETRICS & GYNECOLOGY | Admitting: OBSTETRICS & GYNECOLOGY
Payer: COMMERCIAL

## 2023-12-22 VITALS — WEIGHT: 201 LBS | DIASTOLIC BLOOD PRESSURE: 93 MMHG | BODY MASS INDEX: 40.6 KG/M2 | SYSTOLIC BLOOD PRESSURE: 141 MMHG

## 2023-12-22 DIAGNOSIS — G89.18 POST-OPERATIVE PAIN: ICD-10-CM

## 2023-12-22 DIAGNOSIS — O16.3 ELEVATED BLOOD PRESSURE AFFECTING PREGNANCY IN THIRD TRIMESTER, ANTEPARTUM: ICD-10-CM

## 2023-12-22 DIAGNOSIS — O16.3 HYPERTENSION AFFECTING PREGNANCY, THIRD TRIMESTER: ICD-10-CM

## 2023-12-22 DIAGNOSIS — Z34.83 ENCOUNTER FOR SUPERVISION OF OTHER NORMAL PREGNANCY, THIRD TRIMESTER: ICD-10-CM

## 2023-12-22 DIAGNOSIS — O92.70 LACTATION PROBLEM: Primary | ICD-10-CM

## 2023-12-22 LAB
ALBUMIN SERPL BCP-MCNC: 3.3 G/DL (ref 3.2–4.9)
ALBUMIN/GLOB SERPL: 0.9 G/DL
ALP SERPL-CCNC: 196 U/L (ref 30–99)
ALT SERPL-CCNC: 13 U/L (ref 2–50)
ANION GAP SERPL CALC-SCNC: 15 MMOL/L (ref 7–16)
AST SERPL-CCNC: 16 U/L (ref 12–45)
BASOPHILS # BLD AUTO: 0.2 % (ref 0–1.8)
BASOPHILS # BLD: 0.02 K/UL (ref 0–0.12)
BILIRUB SERPL-MCNC: 0.4 MG/DL (ref 0.1–1.5)
BUN SERPL-MCNC: 11 MG/DL (ref 8–22)
CALCIUM ALBUM COR SERPL-MCNC: 9.5 MG/DL (ref 8.5–10.5)
CALCIUM SERPL-MCNC: 8.9 MG/DL (ref 8.5–10.5)
CHLORIDE SERPL-SCNC: 105 MMOL/L (ref 96–112)
CO2 SERPL-SCNC: 17 MMOL/L (ref 20–33)
CREAT SERPL-MCNC: 0.7 MG/DL (ref 0.5–1.4)
CREAT UR-MCNC: 63.79 MG/DL
EOSINOPHIL # BLD AUTO: 0.05 K/UL (ref 0–0.51)
EOSINOPHIL NFR BLD: 0.5 % (ref 0–6.9)
ERYTHROCYTE [DISTWIDTH] IN BLOOD BY AUTOMATED COUNT: 45.1 FL (ref 35.9–50)
GFR SERPLBLD CREATININE-BSD FMLA CKD-EPI: 120 ML/MIN/1.73 M 2
GLOBULIN SER CALC-MCNC: 3.5 G/DL (ref 1.9–3.5)
GLUCOSE SERPL-MCNC: 139 MG/DL (ref 65–99)
HCT VFR BLD AUTO: 41.4 % (ref 37–47)
HGB BLD-MCNC: 14.6 G/DL (ref 12–16)
IMM GRANULOCYTES # BLD AUTO: 0.05 K/UL (ref 0–0.11)
IMM GRANULOCYTES NFR BLD AUTO: 0.5 % (ref 0–0.9)
LYMPHOCYTES # BLD AUTO: 2.11 K/UL (ref 1–4.8)
LYMPHOCYTES NFR BLD: 22.3 % (ref 22–41)
MCH RBC QN AUTO: 31.5 PG (ref 27–33)
MCHC RBC AUTO-ENTMCNC: 35.3 G/DL (ref 32.2–35.5)
MCV RBC AUTO: 89.2 FL (ref 81.4–97.8)
MONOCYTES # BLD AUTO: 0.54 K/UL (ref 0–0.85)
MONOCYTES NFR BLD AUTO: 5.7 % (ref 0–13.4)
NEUTROPHILS # BLD AUTO: 6.71 K/UL (ref 1.82–7.42)
NEUTROPHILS NFR BLD: 70.8 % (ref 44–72)
NRBC # BLD AUTO: 0 K/UL
NRBC BLD-RTO: 0 /100 WBC (ref 0–0.2)
PLATELET # BLD AUTO: 219 K/UL (ref 164–446)
PMV BLD AUTO: 12.1 FL (ref 9–12.9)
POTASSIUM SERPL-SCNC: 3.8 MMOL/L (ref 3.6–5.5)
PROT SERPL-MCNC: 6.8 G/DL (ref 6–8.2)
PROT UR-MCNC: 48 MG/DL (ref 0–15)
PROT/CREAT UR: 752 MG/G (ref 10–107)
RBC # BLD AUTO: 4.64 M/UL (ref 4.2–5.4)
SODIUM SERPL-SCNC: 137 MMOL/L (ref 135–145)
WBC # BLD AUTO: 9.5 K/UL (ref 4.8–10.8)

## 2023-12-22 PROCEDURE — 0502F SUBSEQUENT PRENATAL CARE: CPT | Performed by: PHYSICIAN ASSISTANT

## 2023-12-22 PROCEDURE — 302790 HCHG STAT ANTEPARTUM CARE, DAILY

## 2023-12-22 PROCEDURE — 99222 1ST HOSP IP/OBS MODERATE 55: CPT | Performed by: OBSTETRICS & GYNECOLOGY

## 2023-12-22 PROCEDURE — A9270 NON-COVERED ITEM OR SERVICE: HCPCS | Performed by: OBSTETRICS & GYNECOLOGY

## 2023-12-22 PROCEDURE — 770002 HCHG ROOM/CARE - OB PRIVATE (112)

## 2023-12-22 PROCEDURE — 80053 COMPREHEN METABOLIC PANEL: CPT

## 2023-12-22 PROCEDURE — 82570 ASSAY OF URINE CREATININE: CPT

## 2023-12-22 PROCEDURE — 87150 DNA/RNA AMPLIFIED PROBE: CPT

## 2023-12-22 PROCEDURE — 700102 HCHG RX REV CODE 250 W/ 637 OVERRIDE(OP): Performed by: OBSTETRICS & GYNECOLOGY

## 2023-12-22 PROCEDURE — 76816 OB US FOLLOW-UP PER FETUS: CPT

## 2023-12-22 PROCEDURE — 36415 COLL VENOUS BLD VENIPUNCTURE: CPT

## 2023-12-22 PROCEDURE — 3077F SYST BP >= 140 MM HG: CPT | Performed by: PHYSICIAN ASSISTANT

## 2023-12-22 PROCEDURE — 85025 COMPLETE CBC W/AUTO DIFF WBC: CPT

## 2023-12-22 PROCEDURE — 87081 CULTURE SCREEN ONLY: CPT

## 2023-12-22 PROCEDURE — 3080F DIAST BP >= 90 MM HG: CPT | Performed by: PHYSICIAN ASSISTANT

## 2023-12-22 PROCEDURE — 59025 FETAL NON-STRESS TEST: CPT

## 2023-12-22 PROCEDURE — 84156 ASSAY OF PROTEIN URINE: CPT

## 2023-12-22 PROCEDURE — 302449 STATCHG TRIAGE ONLY (STATISTIC)

## 2023-12-22 RX ORDER — BISACODYL 10 MG
10 SUPPOSITORY, RECTAL RECTAL
Status: DISCONTINUED | OUTPATIENT
Start: 2023-12-22 | End: 2023-12-24 | Stop reason: HOSPADM

## 2023-12-22 RX ORDER — HYDRALAZINE HYDROCHLORIDE 20 MG/ML
5-10 INJECTION INTRAMUSCULAR; INTRAVENOUS
Status: DISCONTINUED | OUTPATIENT
Start: 2023-12-22 | End: 2023-12-23

## 2023-12-22 RX ORDER — LABETALOL HYDROCHLORIDE 5 MG/ML
20-80 INJECTION, SOLUTION INTRAVENOUS
Status: DISCONTINUED | OUTPATIENT
Start: 2023-12-22 | End: 2023-12-23

## 2023-12-22 RX ORDER — NIFEDIPINE 10 MG/1
10 CAPSULE ORAL
Status: DISCONTINUED | OUTPATIENT
Start: 2023-12-22 | End: 2023-12-23

## 2023-12-22 RX ORDER — LABETALOL 100 MG/1
100 TABLET, FILM COATED ORAL TWICE DAILY
Status: DISCONTINUED | OUTPATIENT
Start: 2023-12-22 | End: 2023-12-24 | Stop reason: HOSPADM

## 2023-12-22 RX ORDER — POLYETHYLENE GLYCOL 3350 17 G/17G
1 POWDER, FOR SOLUTION ORAL
Status: DISCONTINUED | OUTPATIENT
Start: 2023-12-22 | End: 2023-12-24 | Stop reason: HOSPADM

## 2023-12-22 RX ORDER — AMOXICILLIN 250 MG
2 CAPSULE ORAL 2 TIMES DAILY
Status: DISCONTINUED | OUTPATIENT
Start: 2023-12-22 | End: 2023-12-24 | Stop reason: HOSPADM

## 2023-12-22 RX ADMIN — NIFEDIPINE 10 MG: 10 CAPSULE ORAL at 12:02

## 2023-12-22 RX ADMIN — DOCUSATE SODIUM 50 MG AND SENNOSIDES 8.6 MG 2 TABLET: 8.6; 5 TABLET, FILM COATED ORAL at 18:30

## 2023-12-22 RX ADMIN — LABETALOL HYDROCHLORIDE 100 MG: 100 TABLET, FILM COATED ORAL at 20:57

## 2023-12-22 RX ADMIN — LABETALOL HYDROCHLORIDE 100 MG: 100 TABLET, FILM COATED ORAL at 15:15

## 2023-12-22 ASSESSMENT — FIBROSIS 4 INDEX
FIB4 SCORE: 0.42

## 2023-12-22 ASSESSMENT — LIFESTYLE VARIABLES: EVER_SMOKED: NEVER

## 2023-12-22 ASSESSMENT — PATIENT HEALTH QUESTIONNAIRE - PHQ9
2. FEELING DOWN, DEPRESSED, IRRITABLE, OR HOPELESS: NOT AT ALL
SUM OF ALL RESPONSES TO PHQ9 QUESTIONS 1 AND 2: 0
1. LITTLE INTEREST OR PLEASURE IN DOING THINGS: NOT AT ALL

## 2023-12-22 NOTE — PROGRESS NOTES
S: 28 y.o.  at 36w2d presents for routine obstetric follow-up.   Good fetal movement.  No contractions, vaginal bleeding, or leakage of fluid.    Questions answered.    Pt sent to L&D after last visit   due to elevated BP. Pre-e labs WNL.    Pt has not been seen in the clinic for OB follow up or NSTs since then despite being told to f/u in 1 week for BP check.   Checking home BP with averages 120-140s  Today pt denies HA, vision changes, dizziness, ABD pain.     O: BP (!) 141/93, 171/103,158/109   Wt 201 lb   BMI 40.60 kg/m²   Patients' weight gain, fluid intake and exercise level discussed.  Vitals, fundal height , fetal position, and FHR reviewed on flowsheet  BSUS: confirmed vertex lie    TDaP: Administered 23  Flu: Administered 10/16/23  RSV: Educated on RSV vaccine at 32-36w  GBS: Collected today   Rh: positive      A/P:  28 y.o.  at 36w2d presents for routine obstetric follow-up.  Size equals dates and/or scan    Pt sent to L&D for elevated BP in clinic. Report given to on call OB.     Follow-up in 1 weeks.    Ada Ritter P.A.-C.  [facility]

## 2023-12-22 NOTE — PROGRESS NOTES
EDC 2024 EGA 36.2    Pt presented to L&D from [facility] for pre eclampsia work up. Pt denies UC's, LOF, vaginal bleeding, states + fetal movement. Pt denies HA, visual disturbances, right upper quadrant pain. EFM and TOCO applied. BP elevated. Dr. Ovalle notified of pt arrival and BP. Orders for CBC, CMP, PCR received. Pt updated on above mentioned POC.

## 2023-12-22 NOTE — PROGRESS NOTES
1245-Report received, care assumed. POC dicussed with pt.   1700-Dr Rivers updated. Pt would like MD to come to bedside to discuss POC, Dr Rivers updated.   1845-Dr Ovalle at bedside.   1855-Report given Eliza [facility]

## 2023-12-22 NOTE — PROGRESS NOTES
Pt. Here for OB/FU. Reports Good FM.   Good #  Pt. Denies VB, LOF, or UC's.   Pharmacy verified.   Chaperone offered and not indicated

## 2023-12-22 NOTE — PROGRESS NOTES
Dr. Ovalle in to evaluate pt, notified of nifedipine 10mg given. Orders to admit to antepartum for 24* hour urine received. Pt transferred to S233, report to МАРИЯ Rogers

## 2023-12-22 NOTE — H&P
Obstetrics Admission History and Physical      History of Present Illness  Patient is a 28 y.o.  at 36w2d who presents for elevated pressures in OB clinic visit.  She states the baby is moving well.    Pregnancy dated by: TVUS at 5w6d    Pregnancy complications/antepartum admissions:   Patient Active Problem List   Diagnosis    Dyspareunia, female    Herpes          OB History    Para Term  AB Living   2       1     SAB IAB Ectopic Molar Multiple Live Births   1                # Outcome Date GA Lbr Scooby/2nd Weight Sex Delivery Anes PTL Lv   2 Current            1 SAB      SAB          No past medical history on file.    No current facility-administered medications on file prior to encounter.     Current Outpatient Medications on File Prior to Encounter   Medication Sig Dispense Refill    ondansetron (ZOFRAN ODT) 4 MG TABLET DISPERSIBLE Take 1 Tablet by mouth every 6 hours as needed for Nausea/Vomiting. 60 Tablet 2    Misc. Devices (BREAST PUMP) Misc As needed for breastfeeding or milk storage 1 Each 0    Prenatal MV-Min-Fe Fum-FA-DHA (PRENATAL 1 PO) Take 1 Tablet by mouth every day.         No Known Allergies    Family History   Problem Relation Age of Onset    No Known Problems Mother     Heart Disease Father     Diabetes Father        Past Surgical History:   Procedure Laterality Date    WILBUR BY LAPAROSCOPY  2018    Procedure: WILBUR BY LAPAROSCOPY;  Surgeon: Kwesi Clay M.D.;  Location: SURGERY [facility];  Service: General       Social History  Social History     Tobacco Use    Smoking status: Never    Smokeless tobacco: Never   Vaping Use    Vaping Use: Never used   Substance Use Topics    Alcohol use: Not Currently    Drug use: No       Review of Systems   General: Fever: Negative  HEENT: Sore Throat: Negative  CV: Chest Pain: Negative  Repiratory: Shortness of Breath: Negative  GI: Abdominal pain: Negative  : Dysuria: Negative    Physical Examination  Vitals:     23 1145   BP: (!) 156/103   Pulse: 73   Temp:    SpO2:      Appearance/Psychiatric: She does not appear anxious.  Constitutional: The patient is well nourished.  Neck: Neck appears symmetric.  Cardiovascular: No peripheral edema.  Respiratory: Respirations unlabored  Gastrointestinal: Soft, non-tender, gravid.  Extremeties: Legs are symmetric and without tenderness.   Skin: No rash observed.    Pelvic: SVE: deferred    Estimated fetal weight:6.5 lb    Labs:  Recent Labs     23  0701 23  1708 23  1128 23  1128   ABOGROUP O  --   --   --    ABSCRN NEG  --   --   --    HEMOGLOBIN 14.1  --    < > 14.6   PLATELETCT 335  --    < > 219   RUBELLAIGG 74.20  --   --   --    HEPBSAG Non-Reactive  --   --   --    HEPCAB Non-Reactive  --   --   --    GCBYDNAPR  --  Negative  --   --     < > = values in this interval not displayed.     O  GBS unknpwn      Assessment/Plan:   28 y.o.  at 36w2d   Patient Active Problem List    Diagnosis Date Noted    Herpes 2022    Dyspareunia, female 11/15/2022     Patient has laboratory evidence of preeclampsia  Patient does not currently have clear evidence of severe features however her blood pressures are very near this threshold  Patient will be admitted for observation and 24-hour urine protein  If patient does not fact have severe features delivery will be contemplated    Pt and treatment plan DW Dr Ike NAPIER  Will order OB US for EFW and fluid    Yoselin Ovalle M.D.

## 2023-12-23 LAB
ALBUMIN SERPL BCP-MCNC: 3.3 G/DL (ref 3.2–4.9)
ALBUMIN/GLOB SERPL: 1 G/DL
ALP SERPL-CCNC: 194 U/L (ref 30–99)
ALT SERPL-CCNC: 12 U/L (ref 2–50)
ANION GAP SERPL CALC-SCNC: 13 MMOL/L (ref 7–16)
AST SERPL-CCNC: 22 U/L (ref 12–45)
BASOPHILS # BLD AUTO: 0.2 % (ref 0–1.8)
BASOPHILS # BLD: 0.02 K/UL (ref 0–0.12)
BILIRUB SERPL-MCNC: 0.5 MG/DL (ref 0.1–1.5)
BUN SERPL-MCNC: 9 MG/DL (ref 8–22)
CALCIUM ALBUM COR SERPL-MCNC: 9.4 MG/DL (ref 8.5–10.5)
CALCIUM SERPL-MCNC: 8.8 MG/DL (ref 8.5–10.5)
CHLORIDE SERPL-SCNC: 104 MMOL/L (ref 96–112)
CO2 SERPL-SCNC: 17 MMOL/L (ref 20–33)
CREAT SERPL-MCNC: 0.79 MG/DL (ref 0.5–1.4)
EOSINOPHIL # BLD AUTO: 0.05 K/UL (ref 0–0.51)
EOSINOPHIL NFR BLD: 0.5 % (ref 0–6.9)
ERYTHROCYTE [DISTWIDTH] IN BLOOD BY AUTOMATED COUNT: 46 FL (ref 35.9–50)
GFR SERPLBLD CREATININE-BSD FMLA CKD-EPI: 104 ML/MIN/1.73 M 2
GLOBULIN SER CALC-MCNC: 3.4 G/DL (ref 1.9–3.5)
GLUCOSE SERPL-MCNC: 86 MG/DL (ref 65–99)
GP B STREP DNA SPEC QL NAA+PROBE: NEGATIVE
HCT VFR BLD AUTO: 42.4 % (ref 37–47)
HGB BLD-MCNC: 14.5 G/DL (ref 12–16)
IMM GRANULOCYTES # BLD AUTO: 0.05 K/UL (ref 0–0.11)
IMM GRANULOCYTES NFR BLD AUTO: 0.5 % (ref 0–0.9)
LYMPHOCYTES # BLD AUTO: 2.09 K/UL (ref 1–4.8)
LYMPHOCYTES NFR BLD: 22 % (ref 22–41)
MCH RBC QN AUTO: 30.8 PG (ref 27–33)
MCHC RBC AUTO-ENTMCNC: 34.2 G/DL (ref 32.2–35.5)
MCV RBC AUTO: 90 FL (ref 81.4–97.8)
MONOCYTES # BLD AUTO: 0.64 K/UL (ref 0–0.85)
MONOCYTES NFR BLD AUTO: 6.7 % (ref 0–13.4)
NEUTROPHILS # BLD AUTO: 6.66 K/UL (ref 1.82–7.42)
NEUTROPHILS NFR BLD: 70.1 % (ref 44–72)
NRBC # BLD AUTO: 0 K/UL
NRBC BLD-RTO: 0 /100 WBC (ref 0–0.2)
PLATELET # BLD AUTO: 228 K/UL (ref 164–446)
PMV BLD AUTO: 11.9 FL (ref 9–12.9)
POTASSIUM SERPL-SCNC: 3.9 MMOL/L (ref 3.6–5.5)
PROT 24H UR-MCNC: 432 MG/24 HR (ref 30–150)
PROT 24H UR-MRATE: 16 MG/DL (ref 0–15)
PROT SERPL-MCNC: 6.7 G/DL (ref 6–8.2)
RBC # BLD AUTO: 4.71 M/UL (ref 4.2–5.4)
SODIUM SERPL-SCNC: 134 MMOL/L (ref 135–145)
SPECIMEN VOL UR: ABNORMAL ML
WBC # BLD AUTO: 9.5 K/UL (ref 4.8–10.8)

## 2023-12-23 PROCEDURE — 700105 HCHG RX REV CODE 258: Performed by: OBSTETRICS & GYNECOLOGY

## 2023-12-23 PROCEDURE — 3E0P7VZ INTRODUCTION OF HORMONE INTO FEMALE REPRODUCTIVE, VIA NATURAL OR ARTIFICIAL OPENING: ICD-10-PCS | Performed by: ADVANCED PRACTICE MIDWIFE

## 2023-12-23 PROCEDURE — 85025 COMPLETE CBC W/AUTO DIFF WBC: CPT

## 2023-12-23 PROCEDURE — 770002 HCHG ROOM/CARE - OB PRIVATE (112)

## 2023-12-23 PROCEDURE — 99232 SBSQ HOSP IP/OBS MODERATE 35: CPT | Performed by: OBSTETRICS & GYNECOLOGY

## 2023-12-23 PROCEDURE — 99252 IP/OBS CONSLTJ NEW/EST SF 35: CPT | Performed by: OBSTETRICS & GYNECOLOGY

## 2023-12-23 PROCEDURE — 36415 COLL VENOUS BLD VENIPUNCTURE: CPT

## 2023-12-23 PROCEDURE — 700102 HCHG RX REV CODE 250 W/ 637 OVERRIDE(OP): Performed by: OBSTETRICS & GYNECOLOGY

## 2023-12-23 PROCEDURE — 700111 HCHG RX REV CODE 636 W/ 250 OVERRIDE (IP): Performed by: OBSTETRICS & GYNECOLOGY

## 2023-12-23 PROCEDURE — A9270 NON-COVERED ITEM OR SERVICE: HCPCS | Performed by: OBSTETRICS & GYNECOLOGY

## 2023-12-23 PROCEDURE — 81050 URINALYSIS VOLUME MEASURE: CPT

## 2023-12-23 PROCEDURE — 84156 ASSAY OF PROTEIN URINE: CPT

## 2023-12-23 PROCEDURE — 80053 COMPREHEN METABOLIC PANEL: CPT

## 2023-12-23 RX ORDER — NIFEDIPINE 10 MG/1
10 CAPSULE ORAL
Status: DISCONTINUED | OUTPATIENT
Start: 2023-12-23 | End: 2023-12-24 | Stop reason: HOSPADM

## 2023-12-23 RX ORDER — LABETALOL HYDROCHLORIDE 5 MG/ML
20-80 INJECTION, SOLUTION INTRAVENOUS
Status: DISCONTINUED | OUTPATIENT
Start: 2023-12-23 | End: 2023-12-24 | Stop reason: HOSPADM

## 2023-12-23 RX ORDER — HYDRALAZINE HYDROCHLORIDE 20 MG/ML
5-10 INJECTION INTRAMUSCULAR; INTRAVENOUS
Status: DISCONTINUED | OUTPATIENT
Start: 2023-12-23 | End: 2023-12-24 | Stop reason: HOSPADM

## 2023-12-23 RX ADMIN — DOCUSATE SODIUM 50 MG AND SENNOSIDES 8.6 MG 2 TABLET: 8.6; 5 TABLET, FILM COATED ORAL at 18:11

## 2023-12-23 RX ADMIN — LABETALOL HYDROCHLORIDE 100 MG: 100 TABLET, FILM COATED ORAL at 21:20

## 2023-12-23 RX ADMIN — LABETALOL HYDROCHLORIDE 100 MG: 100 TABLET, FILM COATED ORAL at 08:43

## 2023-12-23 RX ADMIN — OXYTOCIN 2 MILLI-UNITS/MIN: 10 INJECTION, SOLUTION INTRAMUSCULAR; INTRAVENOUS at 22:27

## 2023-12-23 ASSESSMENT — LIFESTYLE VARIABLES
HAVE YOU EVER FELT YOU SHOULD CUT DOWN ON YOUR DRINKING: NO
EVER HAD A DRINK FIRST THING IN THE MORNING TO STEADY YOUR NERVES TO GET RID OF A HANGOVER: NO
EVER FELT BAD OR GUILTY ABOUT YOUR DRINKING: NO
TOTAL SCORE: 0
CONSUMPTION TOTAL: INCOMPLETE
TOTAL SCORE: 0
EVER_SMOKED: NEVER
TOTAL SCORE: 0
ALCOHOL_USE: NO
HAVE PEOPLE ANNOYED YOU BY CRITICIZING YOUR DRINKING: NO

## 2023-12-23 ASSESSMENT — PATIENT HEALTH QUESTIONNAIRE - PHQ9
SUM OF ALL RESPONSES TO PHQ9 QUESTIONS 1 AND 2: 0
1. LITTLE INTEREST OR PLEASURE IN DOING THINGS: NOT AT ALL
2. FEELING DOWN, DEPRESSED, IRRITABLE, OR HOPELESS: NOT AT ALL

## 2023-12-23 ASSESSMENT — PAIN DESCRIPTION - PAIN TYPE
TYPE: ACUTE PAIN
TYPE: ACUTE PAIN

## 2023-12-23 NOTE — CARE PLAN
The patient is Watcher - Medium risk of patient condition declining or worsening    Shift Goals  Clinical Goals: BP's remain in stable limits  Patient Goals: stay pregnant  Family Goals: support    Progress made toward(s) clinical / shift goals:  blood pressures WNL with scheduled Labetalol. Pending 24 hour urine.     Patient is not progressing towards the following goals:n/a

## 2023-12-23 NOTE — CONSULTS
Consult was requested by Dr. Yoselin Ovalle  Thank you for your consultation request on Ms. Mary Jane Prater I had a chance to see her at the [facility] on . As you recall, Ms. Ihsan Prater is a 28 y.o. y/o, female, G 2 P 0 with an SHAUN Estimated Date of Delivery: 24 and an EGA 36w3d  who was admitted for elevated BP's , suspected preeclampsia without severe features  She denies any HA, visual changes or epigastric pain.    Past OB History:   OB History    Para Term  AB Living   2       1     SAB IAB Ectopic Molar Multiple Live Births   1                # Outcome Date GA Lbr Scooby/2nd Weight Sex Delivery Anes PTL Lv   2 Current            1 SAB      SAB          Past Gyn History: Denies any history of sexually transmitted disease    Past Medical History:   No past medical history on file.     Past Surgical History:   Past Surgical History:   Procedure Laterality Date    WILBUR BY LAPAROSCOPY  2018    Procedure: WILBUR BY LAPAROSCOPY;  Surgeon: Kwesi Clay M.D.;  Location: SURGERY [facility];  Service: General       Allergies:   Patient has no known allergies.    Medications:     Current Facility-Administered Medications:     NIFEdipine IR (Procardia) capsule 10 mg, 10 mg, Oral, Q20MIN PRN, 10 mg at 23 1202 **OR** labetalol (Normodyne/Trandate) injection 20-80 mg, 20-80 mg, Intravenous, Q10 MIN PRN **OR** hydrALAZINE (Apresoline) injection 5-10 mg, 5-10 mg, Intravenous, Q20MIN PRN, Yoselin Ovalle M.D.    senna-docusate (Pericolace Or Senokot S) 8.6-50 MG per tablet 2 Tablet, 2 Tablet, Oral, BID, 2 Tablet at 23 1830 **AND** polyethylene glycol/lytes (Miralax) Packet 1 Packet, 1 Packet, Oral, QDAY PRN **AND** magnesium hydroxide (Milk Of Magnesia) suspension 30 mL, 30 mL, Oral, QDAY PRN **AND** bisacodyl (Dulcolax) suppository 10 mg, 10 mg, Rectal, QDAY PRN, Yoselin Ovalle M.D.    labetalol (Normodyne) tablet 100 mg, 100 mg, Oral, TWICE DAILYYoselin  MACHELLE Ovalle, 100 mg at 12/23/23 0843    Tob/Etoh   Tobacco Use: Low Risk  (12/22/2023)    Patient History     Smoking Tobacco Use: Never     Smokeless Tobacco Use: Never     Passive Exposure: Not on file   ,   Alcohol Use: Unknown (6/9/2022)    AUDIT-C     Frequency of Alcohol Consumption: Patient refused     Average Number of Drinks: Patient refused     Frequency of Binge Drinking: Patient refused        Family History: Negative for any birth defects, chromosomal abnormalities, or mental retardation in the  paternal or maternal history    Labs:  Lab Results   Component Value Date/Time    WBC 9.5 12/23/2023 05:54 AM    RBC 4.71 12/23/2023 05:54 AM    HEMOGLOBIN 14.5 12/23/2023 05:54 AM    HEMATOCRIT 42.4 12/23/2023 05:54 AM    MCV 90.0 12/23/2023 05:54 AM    MCH 30.8 12/23/2023 05:54 AM    MCHC 34.2 12/23/2023 05:54 AM    MPV 11.9 12/23/2023 05:54 AM    NEUTSPOLYS 70.10 12/23/2023 05:54 AM    LYMPHOCYTES 22.00 12/23/2023 05:54 AM    MONOCYTES 6.70 12/23/2023 05:54 AM    EOSINOPHILS 0.50 12/23/2023 05:54 AM    BASOPHILS 0.20 12/23/2023 05:54 AM      Lab Results   Component Value Date/Time    SODIUM 134 (L) 12/23/2023 05:54 AM    POTASSIUM 3.9 12/23/2023 05:54 AM    CHLORIDE 104 12/23/2023 05:54 AM    CO2 17 (L) 12/23/2023 05:54 AM    GLUCOSE 86 12/23/2023 05:54 AM    BUN 9 12/23/2023 05:54 AM    CREATININE 0.79 12/23/2023 05:54 AM        Sonographic evaluation: See report    Impression:  1. 36w3d Intrauterine Pregnancy  Patient was made aware that the growth is consistent with dates and no abnormalities were noted.    2. Preeclampsia without severe features.  Patient was counseled. Preeclampsia is a multisystem progressive disorder characterized by the new onset of hypertension and proteinuria, or of hypertension and significant end-organ dysfunction with or without proteinuria, in the last half of pregnancy or postpartum. The disorder is caused by placental and maternal vascular dysfunction and always resolves  "after delivery. In 2013, the American College of Obstetricians and Gynecologists removed proteinuria as an essential criterion for diagnosis of preeclampsia (hypertension plus signs of significant end-organ dysfunction are sufficient for diagnosis). They also removed massive proteinuria (5 g/24 hours) and fetal growth restriction as possible features of severe disease because massive proteinuria has a poor correlation with outcome, and fetal growth restriction is managed similarly whether or not preeclampsia is diagnosed. Oliguria was also removed as a characteristic of severe disease.    A subset of women with preeclampsia are classified as manifesting the severe end of the preeclampsia spectrum (called \"preeclampsia with severe features,\" formerly \"severe preeclampsia\").  Systolic blood pressure >160 mmHg or diastolic blood pressure >110 mmHg and proteinuria (with or without signs and symptoms of significant end-organ dysfunction).  Systolic blood pressure >140 mmHg or diastolic blood pressure >90 mmHg (with or without proteinuria) and one or more of the following signs and symptoms of significant end-organ dysfunction:  New-onset cerebral or visual disturbance, such as:  -Photopsia (flashes of light) and/or scotomata (dark areas or gaps in the visual field).  -Severe headache (ie, incapacitating, \"the worst headache I've ever had\") or headache that persists and progresses despite analgesic therapy.  -Altered mental status.  Severe, persistent right upper quadrant or epigastric pain unresponsive to medication and not accounted for by an alternative diagnosis or serum transaminase concentration =2 times upper limit of normal for a specific laboratory, or both.  <100,000 platelets/microL.  Progressive renal insufficiency (serum creatinine >1.1 mg/dL [97.3 micromol/L]; some guidelines also include doubling of serum creatinine concentration in the absence of other renal disease.  Pulmonary edema.    For women with " preeclampsia and features of severe disease, we recommend intrapartum and postpartum seizure prophylaxis. The benefit of seizure prophylaxis is less clear in women without severe hypertension or preeclampsia symptoms; however, we also suggest intrapartum and postpartum prophylaxis for these women. We recommend the use of magnesium sulfate as a first-line agent for seizure prophylaxis in preeclampsia.      Recommendations:  1. Suggest delivery if she develops any signs of severe features  2. Suggest delivery by 37 weeks if no signs of severe features.    The findings and recommendations were reviewed with the patient. Her questions were answered, and she expressed understanding of the information given. She agreed with the recommendations and plan of care as delineated above. Time spent face-to-face was 40 minutes. This included reviewing her prenatal records and hospital chart along with the time spent counseling the patient regarding the findings from today's assessment and plan of care.     Thank you for once again for your referral.

## 2023-12-23 NOTE — PROGRESS NOTES
0300- Received report from МАРИЯ Kay.  Pt currently comfortable in bed, no pain. Continuous monitoring in place.  0700- report given to МАРИЯ Chan. Pt in stable condition, BP WNL, care relinquished.

## 2023-12-23 NOTE — PROGRESS NOTES
1900: Report received from Sue [facility]. Plan of care discussed, care assumed at this time.   0300: Report given to Nghia [facility]. Plan of care discussed, care relinquished at this time.

## 2023-12-23 NOTE — PROGRESS NOTES
ANTEPARTUM PROGRESS NOTE;    Mary Jane Prater is a 28 y.o. female  at 36w3d.  Patient was admitted yesterday with newly diagnosed preeclampsia and is being evaluated for evidence of severe features.  Patient has a 24-hour urine protein being collected.  Patient is laying in bed this morning comfortable and without complaint.  Specifically no headache GI symptoms such as right upper quadrant pain or nausea vomiting and no visual changes.    Patient Active Problem List    Diagnosis Date Noted    Hypertension affecting pregnancy, third trimester 2023    Herpes 2022    Dyspareunia, female 11/15/2022       Review of systems; denies vaginal bleeding, leakage of fluid, uterine contractions, fever chills or abdominal pain  No past medical history on file.  Past Surgical History:   Procedure Laterality Date    WILBUR BY LAPAROSCOPY  2018    Procedure: WILBUR BY LAPAROSCOPY;  Surgeon: Kwesi Clay M.D.;  Location: SURGERY [facility];  Service: General     Patient has no known allergies.  Social History     Socioeconomic History    Marital status: Single     Spouse name: Not on file    Number of children: Not on file    Years of education: Not on file    Highest education level: Not on file   Occupational History    Not on file   Tobacco Use    Smoking status: Never    Smokeless tobacco: Never   Vaping Use    Vaping Use: Never used   Substance and Sexual Activity    Alcohol use: Not Currently    Drug use: No    Sexual activity: Yes     Partners: Male     Birth control/protection: None   Other Topics Concern    Not on file   Social History Narrative    Not on file     Social Determinants of Health     Financial Resource Strain: Unknown (2022)    Overall Financial Resource Strain (CARDIA)     Difficulty of Paying Living Expenses: Patient refused   Food Insecurity: Unknown (2022)    Hunger Vital Sign     Worried About Running Out of Food in the Last Year: Patient refused     Ran Out  "of Food in the Last Year: Patient refused   Transportation Needs: Unknown (6/9/2022)    PRAPARE - Transportation     Lack of Transportation (Medical): Patient refused     Lack of Transportation (Non-Medical): Patient refused   Physical Activity: Not on file   Stress: Not on file   Social Connections: Unknown (6/9/2022)    Social Connection and Isolation Panel [NHANES]     Frequency of Communication with Friends and Family: Patient refused     Frequency of Social Gatherings with Friends and Family: Patient refused     Attends Gnosticist Services: Patient refused     Active Member of Clubs or Organizations: Patient refused     Attends Club or Organization Meetings: Patient refused     Marital Status: Patient refused   Intimate Partner Violence: Not on file   Housing Stability: Unknown (6/9/2022)    Housing Stability Vital Sign     Unable to Pay for Housing in the Last Year: Patient refused     Number of Places Lived in the Last Year: Not on file     Unstable Housing in the Last Year: Patient refused         Physical examination;  Alert and oriented x3  Gen.-well-developed well-nourished female in no apparent distress  HEENT-normocephalic, nontraumatic,EOMI,PERRLA  /78   Pulse 79   Temp 36.1 °C (97 °F) (Temporal)   Resp 16   Ht 1.499 m (4' 11\")   Wt 91.2 kg (201 lb)   SpO2 92%   BMI 40.60 kg/m²   Skin is warm and dry  Back-negative for CVA tenderness  Cardiovascular-regular rate and rhythm, normal S1-S2 no murmurs gallops  Lungs-clear to stitch bilaterally  Abdomen-nondistended positive bowel sounds soft nontender without masses or hepatosplenomegaly  Cervix-deferred  Extremities without cyanosis clubbing or edema  Neurologic grossly intact    Recent Labs     12/22/23  1128 12/23/23  0554   WBC 9.5 9.5   RBC 4.64 4.71   HEMOGLOBIN 14.6 14.5   HEMATOCRIT 41.4 42.4   MCV 89.2 90.0   MCH 31.5 30.8   RDW 45.1 46.0   PLATELETCT 219 228   MPV 12.1 11.9   NEUTSPOLYS 70.80 70.10   LYMPHOCYTES 22.30 22.00 "   MONOCYTES 5.70 6.70   EOSINOPHILS 0.50 0.50   BASOPHILS 0.20 0.20      Recent Labs     12/22/23  1128 12/23/23  0554   ALBUMIN 3.3 3.3   TBILIRUBIN 0.4 0.5   ALKPHOSPHAT 196* 194*   TOTPROTEIN 6.8 6.7   ALTSGPT 13 12   ASTSGOT 16 22   CREATININE 0.70 0.79      Recent Labs     12/22/23  1128 12/23/23  0554   SODIUM 137 134*   POTASSIUM 3.8 3.9   CHLORIDE 105 104   CO2 17* 17*   GLUCOSE 139* 86   BUN 11 9        Impression;  IUP AT 36w3d  Preeclampsia toxemia, overnight on 100 mg p.o. labetalol twice daily patient's blood pressures have mostly normalized.  Patient had 1 severe range systolic of 167 but the majority of her blood pressures have been in the 118-139/74-88    Plan;  MFM consult today  Complete 24-hour urine  Evaluate for delivery now versus delivery after 37 completed weeks gestational age

## 2023-12-23 NOTE — PROGRESS NOTES
0700- report received from Violeta [facility]. Plan of care discussed    0745- assessment done. Patient denies any contractions, LOF or any VB. States positive fetal movement. Patient denies any HA or vision changes.     1010- Dr Mendez in department. Updated on fetal tracing and AM blood pressures.  Order received for NST Q shift     1330- 24 hour urine sent to lab.     1630- Dr Mendez updated on 24 hour urine results.     1900- report given to Mindy [facility]

## 2023-12-23 NOTE — PROGRESS NOTES
ANTEPARTUM PROGRESS NOTE;    Mary Jane Prater is a 28 y.o. female  at 36w2d.  Patient is admitted with preeclampsia, rule out severe features.  And asks for clarification of the plan of care.    Patient Active Problem List    Diagnosis Date Noted    Hypertension affecting pregnancy, third trimester 2023    Herpes 2022    Dyspareunia, female 11/15/2022       Review of systems; denies vaginal bleeding, leakage of fluid, uterine contractions, fever chills or abdominal pain  No past medical history on file.  Past Surgical History:   Procedure Laterality Date    WILBUR BY LAPAROSCOPY  2018    Procedure: WILBUR BY LAPAROSCOPY;  Surgeon: Kwesi Clay M.D.;  Location: SURGERY [facility];  Service: General     Patient has no known allergies.  Social History     Socioeconomic History    Marital status: Single     Spouse name: Not on file    Number of children: Not on file    Years of education: Not on file    Highest education level: Not on file   Occupational History    Not on file   Tobacco Use    Smoking status: Never    Smokeless tobacco: Never   Vaping Use    Vaping Use: Never used   Substance and Sexual Activity    Alcohol use: Not Currently    Drug use: No    Sexual activity: Yes     Partners: Male     Birth control/protection: None   Other Topics Concern    Not on file   Social History Narrative    Not on file     Social Determinants of Health     Financial Resource Strain: Unknown (2022)    Overall Financial Resource Strain (CARDIA)     Difficulty of Paying Living Expenses: Patient refused   Food Insecurity: Unknown (2022)    Hunger Vital Sign     Worried About Running Out of Food in the Last Year: Patient refused     Ran Out of Food in the Last Year: Patient refused   Transportation Needs: Unknown (2022)    PRAPARE - Transportation     Lack of Transportation (Medical): Patient refused     Lack of Transportation (Non-Medical): Patient refused   Physical Activity:  "Not on file   Stress: Not on file   Social Connections: Unknown (6/9/2022)    Social Connection and Isolation Panel [NHANES]     Frequency of Communication with Friends and Family: Patient refused     Frequency of Social Gatherings with Friends and Family: Patient refused     Attends Tenriism Services: Patient refused     Active Member of Clubs or Organizations: Patient refused     Attends Club or Organization Meetings: Patient refused     Marital Status: Patient refused   Intimate Partner Violence: Not on file   Housing Stability: Unknown (6/9/2022)    Housing Stability Vital Sign     Unable to Pay for Housing in the Last Year: Patient refused     Number of Places Lived in the Last Year: Not on file     Unstable Housing in the Last Year: Patient refused         Physical examination;  Alert and oriented x3  Gen.-well-developed well-nourished female in no apparent distress  HEENT-normocephalic, nontraumatic,EOMI,PERRLA  /83   Pulse 81   Temp 36.7 °C (98 °F) (Temporal)   Resp 17   Ht 1.499 m (4' 11\")   Wt 91.2 kg (201 lb)   SpO2 92%   BMI 40.60 kg/m²         Impression;  IUP AT 36w2d  Preeclampsia by elevated blood pressures and elevated protein creatinine ratio.  Patient has no headache no visual changes no right upper quadrant pain or other GI symptoms and her lab results are otherwise normal.  Patient has a 24-hour urine collection which will be completed tomorrow midday to early afternoon    Plan;  Plan to evaluate patient for signs or symptoms of severe preeclampsia which would indicate moving directly to magnesium sulfate and delivery versus delivery at 37 weeks gestation  All patient's questions are answered and she expresses comfort with this plan         "

## 2023-12-24 ENCOUNTER — ANESTHESIA EVENT (OUTPATIENT)
Dept: OBGYN | Facility: MEDICAL CENTER | Age: 28
End: 2023-12-24
Payer: COMMERCIAL

## 2023-12-24 ENCOUNTER — ANESTHESIA (OUTPATIENT)
Dept: OBGYN | Facility: MEDICAL CENTER | Age: 28
End: 2023-12-24
Payer: COMMERCIAL

## 2023-12-24 PROCEDURE — C1755 CATHETER, INTRASPINAL: HCPCS | Performed by: OBSTETRICS & GYNECOLOGY

## 2023-12-24 PROCEDURE — 160009 HCHG ANES TIME/MIN: Performed by: OBSTETRICS & GYNECOLOGY

## 2023-12-24 PROCEDURE — 700111 HCHG RX REV CODE 636 W/ 250 OVERRIDE (IP)

## 2023-12-24 PROCEDURE — 160002 HCHG RECOVERY MINUTES (STAT): Performed by: OBSTETRICS & GYNECOLOGY

## 2023-12-24 PROCEDURE — 700105 HCHG RX REV CODE 258: Performed by: OBSTETRICS & GYNECOLOGY

## 2023-12-24 PROCEDURE — 700102 HCHG RX REV CODE 250 W/ 637 OVERRIDE(OP): Performed by: OBSTETRICS & GYNECOLOGY

## 2023-12-24 PROCEDURE — 700111 HCHG RX REV CODE 636 W/ 250 OVERRIDE (IP): Mod: JZ | Performed by: ANESTHESIOLOGY

## 2023-12-24 PROCEDURE — 160035 HCHG PACU - 1ST 60 MINS PHASE I: Performed by: OBSTETRICS & GYNECOLOGY

## 2023-12-24 PROCEDURE — 700102 HCHG RX REV CODE 250 W/ 637 OVERRIDE(OP): Performed by: ANESTHESIOLOGY

## 2023-12-24 PROCEDURE — 88307 TISSUE EXAM BY PATHOLOGIST: CPT

## 2023-12-24 PROCEDURE — 160029 HCHG SURGERY MINUTES - 1ST 30 MINS LEVEL 4: Performed by: OBSTETRICS & GYNECOLOGY

## 2023-12-24 PROCEDURE — 160048 HCHG OR STATISTICAL LEVEL 1-5: Performed by: OBSTETRICS & GYNECOLOGY

## 2023-12-24 PROCEDURE — 700111 HCHG RX REV CODE 636 W/ 250 OVERRIDE (IP): Mod: JZ | Performed by: OBSTETRICS & GYNECOLOGY

## 2023-12-24 PROCEDURE — 700102 HCHG RX REV CODE 250 W/ 637 OVERRIDE(OP): Performed by: ADVANCED PRACTICE MIDWIFE

## 2023-12-24 PROCEDURE — 700105 HCHG RX REV CODE 258: Performed by: ANESTHESIOLOGY

## 2023-12-24 PROCEDURE — 303615 HCHG EPIDURAL/SPINAL ANESTHESIA FOR LABOR

## 2023-12-24 PROCEDURE — 700101 HCHG RX REV CODE 250: Performed by: ANESTHESIOLOGY

## 2023-12-24 PROCEDURE — 160041 HCHG SURGERY MINUTES - EA ADDL 1 MIN LEVEL 4: Performed by: OBSTETRICS & GYNECOLOGY

## 2023-12-24 PROCEDURE — C1765 ADHESION BARRIER: HCPCS | Performed by: OBSTETRICS & GYNECOLOGY

## 2023-12-24 PROCEDURE — 59510 CESAREAN DELIVERY: CPT | Performed by: STUDENT IN AN ORGANIZED HEALTH CARE EDUCATION/TRAINING PROGRAM

## 2023-12-24 PROCEDURE — 700111 HCHG RX REV CODE 636 W/ 250 OVERRIDE (IP): Performed by: OBSTETRICS & GYNECOLOGY

## 2023-12-24 PROCEDURE — A9270 NON-COVERED ITEM OR SERVICE: HCPCS | Performed by: ADVANCED PRACTICE MIDWIFE

## 2023-12-24 PROCEDURE — A9270 NON-COVERED ITEM OR SERVICE: HCPCS | Performed by: OBSTETRICS & GYNECOLOGY

## 2023-12-24 PROCEDURE — A9270 NON-COVERED ITEM OR SERVICE: HCPCS | Performed by: ANESTHESIOLOGY

## 2023-12-24 PROCEDURE — 700111 HCHG RX REV CODE 636 W/ 250 OVERRIDE (IP): Performed by: ANESTHESIOLOGY

## 2023-12-24 PROCEDURE — 770002 HCHG ROOM/CARE - OB PRIVATE (112)

## 2023-12-24 RX ORDER — ACETAMINOPHEN 500 MG
1000 TABLET ORAL EVERY 6 HOURS
Status: COMPLETED | OUTPATIENT
Start: 2023-12-24 | End: 2023-12-25

## 2023-12-24 RX ORDER — HYDROMORPHONE HYDROCHLORIDE 1 MG/ML
0.1 INJECTION, SOLUTION INTRAMUSCULAR; INTRAVENOUS; SUBCUTANEOUS
Status: DISCONTINUED | OUTPATIENT
Start: 2023-12-24 | End: 2023-12-24 | Stop reason: HOSPADM

## 2023-12-24 RX ORDER — LABETALOL 100 MG/1
200 TABLET, FILM COATED ORAL EVERY 8 HOURS
Status: DISCONTINUED | OUTPATIENT
Start: 2023-12-24 | End: 2023-12-24

## 2023-12-24 RX ORDER — ROPIVACAINE HYDROCHLORIDE 2 MG/ML
INJECTION, SOLUTION EPIDURAL; INFILTRATION; PERINEURAL
Status: COMPLETED
Start: 2023-12-24 | End: 2023-12-24

## 2023-12-24 RX ORDER — KETOROLAC TROMETHAMINE 30 MG/ML
INJECTION, SOLUTION INTRAMUSCULAR; INTRAVENOUS PRN
Status: DISCONTINUED | OUTPATIENT
Start: 2023-12-24 | End: 2023-12-24 | Stop reason: SURG

## 2023-12-24 RX ORDER — ACETAMINOPHEN 500 MG
1000 TABLET ORAL EVERY 6 HOURS PRN
Status: DISCONTINUED | OUTPATIENT
Start: 2023-12-28 | End: 2023-12-27 | Stop reason: HOSPADM

## 2023-12-24 RX ORDER — ONDANSETRON 2 MG/ML
4 INJECTION INTRAMUSCULAR; INTRAVENOUS EVERY 6 HOURS PRN
Status: ACTIVE | OUTPATIENT
Start: 2023-12-24 | End: 2023-12-25

## 2023-12-24 RX ORDER — HYDROMORPHONE HYDROCHLORIDE 1 MG/ML
0.2 INJECTION, SOLUTION INTRAMUSCULAR; INTRAVENOUS; SUBCUTANEOUS
Status: ACTIVE | OUTPATIENT
Start: 2023-12-24 | End: 2023-12-25

## 2023-12-24 RX ORDER — DIPHENHYDRAMINE HYDROCHLORIDE 50 MG/ML
25 INJECTION INTRAMUSCULAR; INTRAVENOUS EVERY 6 HOURS PRN
Status: DISCONTINUED | OUTPATIENT
Start: 2023-12-25 | End: 2023-12-27 | Stop reason: HOSPADM

## 2023-12-24 RX ORDER — EPHEDRINE SULFATE 50 MG/ML
5 INJECTION, SOLUTION INTRAVENOUS
Status: DISCONTINUED | OUTPATIENT
Start: 2023-12-24 | End: 2023-12-24 | Stop reason: HOSPADM

## 2023-12-24 RX ORDER — MORPHINE SULFATE 0.5 MG/ML
INJECTION, SOLUTION EPIDURAL; INTRATHECAL; INTRAVENOUS PRN
Status: DISCONTINUED | OUTPATIENT
Start: 2023-12-24 | End: 2023-12-24 | Stop reason: SURG

## 2023-12-24 RX ORDER — HYDROMORPHONE HYDROCHLORIDE 1 MG/ML
0.4 INJECTION, SOLUTION INTRAMUSCULAR; INTRAVENOUS; SUBCUTANEOUS
Status: ACTIVE | OUTPATIENT
Start: 2023-12-24 | End: 2023-12-25

## 2023-12-24 RX ORDER — DIPHENHYDRAMINE HYDROCHLORIDE 50 MG/ML
12.5 INJECTION INTRAMUSCULAR; INTRAVENOUS EVERY 6 HOURS PRN
Status: ACTIVE | OUTPATIENT
Start: 2023-12-24 | End: 2023-12-25

## 2023-12-24 RX ORDER — OXYCODONE HCL 5 MG/5 ML
5 SOLUTION, ORAL ORAL
Status: DISCONTINUED | OUTPATIENT
Start: 2023-12-24 | End: 2023-12-24 | Stop reason: HOSPADM

## 2023-12-24 RX ORDER — SODIUM CHLORIDE, SODIUM LACTATE, POTASSIUM CHLORIDE, CALCIUM CHLORIDE 600; 310; 30; 20 MG/100ML; MG/100ML; MG/100ML; MG/100ML
INJECTION, SOLUTION INTRAVENOUS CONTINUOUS
Status: DISCONTINUED | OUTPATIENT
Start: 2023-12-24 | End: 2023-12-27 | Stop reason: HOSPADM

## 2023-12-24 RX ORDER — ONDANSETRON 2 MG/ML
4 INJECTION INTRAMUSCULAR; INTRAVENOUS EVERY 6 HOURS PRN
Status: DISCONTINUED | OUTPATIENT
Start: 2023-12-25 | End: 2023-12-27 | Stop reason: HOSPADM

## 2023-12-24 RX ORDER — LABETALOL 100 MG/1
100 TABLET, FILM COATED ORAL EVERY 8 HOURS
Status: DISCONTINUED | OUTPATIENT
Start: 2023-12-25 | End: 2023-12-24

## 2023-12-24 RX ORDER — EPHEDRINE SULFATE 50 MG/ML
10 INJECTION, SOLUTION INTRAVENOUS
Status: ACTIVE | OUTPATIENT
Start: 2023-12-24 | End: 2023-12-25

## 2023-12-24 RX ORDER — CEFAZOLIN SODIUM 1 G/3ML
2 INJECTION, POWDER, FOR SOLUTION INTRAMUSCULAR; INTRAVENOUS ONCE
Status: DISCONTINUED | OUTPATIENT
Start: 2023-12-24 | End: 2023-12-24 | Stop reason: HOSPADM

## 2023-12-24 RX ORDER — LABETALOL HYDROCHLORIDE 5 MG/ML
5 INJECTION, SOLUTION INTRAVENOUS
Status: DISCONTINUED | OUTPATIENT
Start: 2023-12-24 | End: 2023-12-24

## 2023-12-24 RX ORDER — OXYTOCIN 10 [USP'U]/ML
10 INJECTION, SOLUTION INTRAMUSCULAR; INTRAVENOUS
Status: DISCONTINUED | OUTPATIENT
Start: 2023-12-24 | End: 2023-12-27 | Stop reason: HOSPADM

## 2023-12-24 RX ORDER — ONDANSETRON 2 MG/ML
4 INJECTION INTRAMUSCULAR; INTRAVENOUS
Status: DISCONTINUED | OUTPATIENT
Start: 2023-12-24 | End: 2023-12-24 | Stop reason: HOSPADM

## 2023-12-24 RX ORDER — OXYCODONE HYDROCHLORIDE 5 MG/1
5 TABLET ORAL EVERY 4 HOURS PRN
Status: ACTIVE | OUTPATIENT
Start: 2023-12-24 | End: 2023-12-25

## 2023-12-24 RX ORDER — LIDOCAINE HCL/EPINEPHRINE/PF 2%-1:200K
VIAL (ML) INJECTION PRN
Status: DISCONTINUED | OUTPATIENT
Start: 2023-12-24 | End: 2023-12-24 | Stop reason: SURG

## 2023-12-24 RX ORDER — DIPHENHYDRAMINE HYDROCHLORIDE 50 MG/ML
12.5 INJECTION INTRAMUSCULAR; INTRAVENOUS
Status: DISCONTINUED | OUTPATIENT
Start: 2023-12-24 | End: 2023-12-24 | Stop reason: HOSPADM

## 2023-12-24 RX ORDER — SODIUM CHLORIDE, SODIUM LACTATE, POTASSIUM CHLORIDE, AND CALCIUM CHLORIDE .6; .31; .03; .02 G/100ML; G/100ML; G/100ML; G/100ML
250 INJECTION, SOLUTION INTRAVENOUS PRN
Status: DISCONTINUED | OUTPATIENT
Start: 2023-12-24 | End: 2023-12-24 | Stop reason: HOSPADM

## 2023-12-24 RX ORDER — HYDRALAZINE HYDROCHLORIDE 20 MG/ML
5 INJECTION INTRAMUSCULAR; INTRAVENOUS
Status: DISCONTINUED | OUTPATIENT
Start: 2023-12-24 | End: 2023-12-24

## 2023-12-24 RX ORDER — MEPERIDINE HYDROCHLORIDE 25 MG/ML
12.5 INJECTION INTRAMUSCULAR; INTRAVENOUS; SUBCUTANEOUS
Status: DISCONTINUED | OUTPATIENT
Start: 2023-12-24 | End: 2023-12-24 | Stop reason: HOSPADM

## 2023-12-24 RX ORDER — KETOROLAC TROMETHAMINE 30 MG/ML
15 INJECTION, SOLUTION INTRAMUSCULAR; INTRAVENOUS EVERY 6 HOURS
Status: COMPLETED | OUTPATIENT
Start: 2023-12-24 | End: 2023-12-25

## 2023-12-24 RX ORDER — HYDROMORPHONE HYDROCHLORIDE 1 MG/ML
0.4 INJECTION, SOLUTION INTRAMUSCULAR; INTRAVENOUS; SUBCUTANEOUS
Status: DISCONTINUED | OUTPATIENT
Start: 2023-12-24 | End: 2023-12-24 | Stop reason: HOSPADM

## 2023-12-24 RX ORDER — ONDANSETRON 2 MG/ML
4 INJECTION INTRAMUSCULAR; INTRAVENOUS EVERY 4 HOURS PRN
Status: DISCONTINUED | OUTPATIENT
Start: 2023-12-24 | End: 2023-12-24 | Stop reason: HOSPADM

## 2023-12-24 RX ORDER — LABETALOL 100 MG/1
100 TABLET, FILM COATED ORAL TWICE DAILY
Status: DISCONTINUED | OUTPATIENT
Start: 2023-12-24 | End: 2023-12-24

## 2023-12-24 RX ORDER — SODIUM CHLORIDE, SODIUM LACTATE, POTASSIUM CHLORIDE, AND CALCIUM CHLORIDE .6; .31; .03; .02 G/100ML; G/100ML; G/100ML; G/100ML
1000 INJECTION, SOLUTION INTRAVENOUS
Status: DISCONTINUED | OUTPATIENT
Start: 2023-12-24 | End: 2023-12-24

## 2023-12-24 RX ORDER — IBUPROFEN 800 MG/1
800 TABLET ORAL EVERY 8 HOURS PRN
Status: DISCONTINUED | OUTPATIENT
Start: 2023-12-28 | End: 2023-12-25

## 2023-12-24 RX ORDER — HALOPERIDOL 5 MG/ML
1 INJECTION INTRAMUSCULAR
Status: DISCONTINUED | OUTPATIENT
Start: 2023-12-24 | End: 2023-12-24 | Stop reason: HOSPADM

## 2023-12-24 RX ORDER — ROPIVACAINE HYDROCHLORIDE 2 MG/ML
INJECTION, SOLUTION EPIDURAL; INFILTRATION
Status: COMPLETED | OUTPATIENT
Start: 2023-12-24 | End: 2023-12-24

## 2023-12-24 RX ORDER — ONDANSETRON 4 MG/1
4 TABLET, ORALLY DISINTEGRATING ORAL EVERY 6 HOURS PRN
Status: DISCONTINUED | OUTPATIENT
Start: 2023-12-25 | End: 2023-12-27 | Stop reason: HOSPADM

## 2023-12-24 RX ORDER — CEFAZOLIN SODIUM 1 G/3ML
INJECTION, POWDER, FOR SOLUTION INTRAMUSCULAR; INTRAVENOUS PRN
Status: DISCONTINUED | OUTPATIENT
Start: 2023-12-24 | End: 2023-12-24 | Stop reason: SURG

## 2023-12-24 RX ORDER — IPRATROPIUM BROMIDE AND ALBUTEROL SULFATE 2.5; .5 MG/3ML; MG/3ML
3 SOLUTION RESPIRATORY (INHALATION)
Status: DISCONTINUED | OUTPATIENT
Start: 2023-12-24 | End: 2023-12-24 | Stop reason: HOSPADM

## 2023-12-24 RX ORDER — TERBUTALINE SULFATE 1 MG/ML
0.25 INJECTION, SOLUTION SUBCUTANEOUS ONCE
Status: COMPLETED | OUTPATIENT
Start: 2023-12-24 | End: 2023-12-24

## 2023-12-24 RX ORDER — METOCLOPRAMIDE HYDROCHLORIDE 5 MG/ML
10 INJECTION INTRAMUSCULAR; INTRAVENOUS ONCE
Status: DISCONTINUED | OUTPATIENT
Start: 2023-12-24 | End: 2023-12-24 | Stop reason: HOSPADM

## 2023-12-24 RX ORDER — TERBUTALINE SULFATE 1 MG/ML
INJECTION, SOLUTION SUBCUTANEOUS
Status: DISCONTINUED
Start: 2023-12-24 | End: 2023-12-24

## 2023-12-24 RX ORDER — OXYCODONE HYDROCHLORIDE 10 MG/1
10 TABLET ORAL EVERY 4 HOURS PRN
Status: ACTIVE | OUTPATIENT
Start: 2023-12-24 | End: 2023-12-25

## 2023-12-24 RX ORDER — IBUPROFEN 800 MG/1
800 TABLET ORAL EVERY 8 HOURS
Status: DISCONTINUED | OUTPATIENT
Start: 2023-12-25 | End: 2023-12-25

## 2023-12-24 RX ORDER — ACETAMINOPHEN 500 MG
1000 TABLET ORAL EVERY 6 HOURS
Status: DISCONTINUED | OUTPATIENT
Start: 2023-12-25 | End: 2023-12-27 | Stop reason: HOSPADM

## 2023-12-24 RX ORDER — DIPHENHYDRAMINE HCL 25 MG
25 TABLET ORAL EVERY 6 HOURS PRN
Status: DISCONTINUED | OUTPATIENT
Start: 2023-12-25 | End: 2023-12-27 | Stop reason: HOSPADM

## 2023-12-24 RX ORDER — SODIUM CHLORIDE, SODIUM LACTATE, POTASSIUM CHLORIDE, AND CALCIUM CHLORIDE .6; .31; .03; .02 G/100ML; G/100ML; G/100ML; G/100ML
500 INJECTION, SOLUTION INTRAVENOUS
Status: DISCONTINUED | OUTPATIENT
Start: 2023-12-24 | End: 2023-12-24 | Stop reason: HOSPADM

## 2023-12-24 RX ORDER — CITRIC ACID/SODIUM CITRATE 334-500MG
30 SOLUTION, ORAL ORAL ONCE
Status: DISCONTINUED | OUTPATIENT
Start: 2023-12-24 | End: 2023-12-24 | Stop reason: HOSPADM

## 2023-12-24 RX ORDER — OXYCODONE HCL 5 MG/5 ML
10 SOLUTION, ORAL ORAL
Status: DISCONTINUED | OUTPATIENT
Start: 2023-12-24 | End: 2023-12-24 | Stop reason: HOSPADM

## 2023-12-24 RX ORDER — SODIUM CHLORIDE, SODIUM GLUCONATE, SODIUM ACETATE, POTASSIUM CHLORIDE AND MAGNESIUM CHLORIDE 526; 502; 368; 37; 30 MG/100ML; MG/100ML; MG/100ML; MG/100ML; MG/100ML
INJECTION, SOLUTION INTRAVENOUS
Status: DISCONTINUED | OUTPATIENT
Start: 2023-12-24 | End: 2023-12-24 | Stop reason: SURG

## 2023-12-24 RX ORDER — LABETALOL 100 MG/1
200 TABLET, FILM COATED ORAL EVERY 8 HOURS
Status: DISCONTINUED | OUTPATIENT
Start: 2023-12-25 | End: 2023-12-27 | Stop reason: HOSPADM

## 2023-12-24 RX ORDER — DOCUSATE SODIUM 100 MG/1
100 CAPSULE, LIQUID FILLED ORAL 2 TIMES DAILY PRN
Status: DISCONTINUED | OUTPATIENT
Start: 2023-12-24 | End: 2023-12-27 | Stop reason: HOSPADM

## 2023-12-24 RX ORDER — OXYCODONE HYDROCHLORIDE 5 MG/1
5 TABLET ORAL EVERY 4 HOURS PRN
Status: DISCONTINUED | OUTPATIENT
Start: 2023-12-25 | End: 2023-12-27 | Stop reason: HOSPADM

## 2023-12-24 RX ORDER — SODIUM CHLORIDE, SODIUM LACTATE, POTASSIUM CHLORIDE, CALCIUM CHLORIDE 600; 310; 30; 20 MG/100ML; MG/100ML; MG/100ML; MG/100ML
INJECTION, SOLUTION INTRAVENOUS ONCE
Status: ACTIVE | OUTPATIENT
Start: 2023-12-24 | End: 2023-12-25

## 2023-12-24 RX ORDER — HYDROMORPHONE HYDROCHLORIDE 1 MG/ML
0.2 INJECTION, SOLUTION INTRAMUSCULAR; INTRAVENOUS; SUBCUTANEOUS
Status: DISCONTINUED | OUTPATIENT
Start: 2023-12-24 | End: 2023-12-24 | Stop reason: HOSPADM

## 2023-12-24 RX ORDER — LIDOCAINE HYDROCHLORIDE AND EPINEPHRINE 15; 5 MG/ML; UG/ML
INJECTION, SOLUTION EPIDURAL
Status: COMPLETED | OUTPATIENT
Start: 2023-12-24 | End: 2023-12-24

## 2023-12-24 RX ORDER — SODIUM CHLORIDE, SODIUM GLUCONATE, SODIUM ACETATE, POTASSIUM CHLORIDE AND MAGNESIUM CHLORIDE 526; 502; 368; 37; 30 MG/100ML; MG/100ML; MG/100ML; MG/100ML; MG/100ML
500 INJECTION, SOLUTION INTRAVENOUS CONTINUOUS
Status: DISCONTINUED | OUTPATIENT
Start: 2023-12-24 | End: 2023-12-27 | Stop reason: HOSPADM

## 2023-12-24 RX ORDER — EPHEDRINE SULFATE 50 MG/ML
5 INJECTION, SOLUTION INTRAVENOUS
Status: DISCONTINUED | OUTPATIENT
Start: 2023-12-24 | End: 2023-12-24

## 2023-12-24 RX ORDER — PHENYLEPHRINE HYDROCHLORIDE 10 MG/ML
INJECTION, SOLUTION INTRAMUSCULAR; INTRAVENOUS; SUBCUTANEOUS PRN
Status: DISCONTINUED | OUTPATIENT
Start: 2023-12-24 | End: 2023-12-24 | Stop reason: SURG

## 2023-12-24 RX ORDER — ROPIVACAINE HYDROCHLORIDE 2 MG/ML
INJECTION, SOLUTION EPIDURAL; INFILTRATION; PERINEURAL CONTINUOUS
Status: DISCONTINUED | OUTPATIENT
Start: 2023-12-24 | End: 2023-12-27 | Stop reason: HOSPADM

## 2023-12-24 RX ORDER — OXYCODONE HYDROCHLORIDE 10 MG/1
10 TABLET ORAL EVERY 4 HOURS PRN
Status: DISCONTINUED | OUTPATIENT
Start: 2023-12-25 | End: 2023-12-27 | Stop reason: HOSPADM

## 2023-12-24 RX ORDER — SODIUM CHLORIDE, SODIUM LACTATE, POTASSIUM CHLORIDE, CALCIUM CHLORIDE 600; 310; 30; 20 MG/100ML; MG/100ML; MG/100ML; MG/100ML
INJECTION, SOLUTION INTRAVENOUS PRN
Status: DISCONTINUED | OUTPATIENT
Start: 2023-12-24 | End: 2023-12-27 | Stop reason: HOSPADM

## 2023-12-24 RX ORDER — DIPHENHYDRAMINE HYDROCHLORIDE 50 MG/ML
25 INJECTION INTRAMUSCULAR; INTRAVENOUS EVERY 6 HOURS PRN
Status: DISPENSED | OUTPATIENT
Start: 2023-12-24 | End: 2023-12-25

## 2023-12-24 RX ORDER — METOPROLOL TARTRATE 1 MG/ML
1 INJECTION, SOLUTION INTRAVENOUS
Status: DISCONTINUED | OUTPATIENT
Start: 2023-12-24 | End: 2023-12-24 | Stop reason: HOSPADM

## 2023-12-24 RX ORDER — ONDANSETRON 2 MG/ML
INJECTION INTRAMUSCULAR; INTRAVENOUS PRN
Status: DISCONTINUED | OUTPATIENT
Start: 2023-12-24 | End: 2023-12-24 | Stop reason: SURG

## 2023-12-24 RX ORDER — SODIUM CHLORIDE, SODIUM GLUCONATE, SODIUM ACETATE, POTASSIUM CHLORIDE AND MAGNESIUM CHLORIDE 526; 502; 368; 37; 30 MG/100ML; MG/100ML; MG/100ML; MG/100ML; MG/100ML
1000 INJECTION, SOLUTION INTRAVENOUS ONCE
Status: DISCONTINUED | OUTPATIENT
Start: 2023-12-24 | End: 2023-12-24 | Stop reason: HOSPADM

## 2023-12-24 RX ORDER — DEXAMETHASONE SODIUM PHOSPHATE 4 MG/ML
INJECTION, SOLUTION INTRA-ARTICULAR; INTRALESIONAL; INTRAMUSCULAR; INTRAVENOUS; SOFT TISSUE PRN
Status: DISCONTINUED | OUTPATIENT
Start: 2023-12-24 | End: 2023-12-24 | Stop reason: SURG

## 2023-12-24 RX ADMIN — MORPHINE SULFATE 3 MG: 0.5 INJECTION, SOLUTION EPIDURAL; INTRATHECAL; INTRAVENOUS at 14:37

## 2023-12-24 RX ADMIN — AZITHROMYCIN 500 MG: 500 INJECTION, POWDER, LYOPHILIZED, FOR SOLUTION INTRAVENOUS at 14:23

## 2023-12-24 RX ADMIN — SODIUM CHLORIDE, POTASSIUM CHLORIDE, SODIUM LACTATE AND CALCIUM CHLORIDE: 600; 310; 30; 20 INJECTION, SOLUTION INTRAVENOUS at 06:00

## 2023-12-24 RX ADMIN — PHENYLEPHRINE HYDROCHLORIDE 200 MCG: 10 INJECTION INTRAVENOUS at 14:38

## 2023-12-24 RX ADMIN — SODIUM CHLORIDE, SODIUM GLUCONATE, SODIUM ACETATE, POTASSIUM CHLORIDE AND MAGNESIUM CHLORIDE: 526; 502; 368; 37; 30 INJECTION, SOLUTION INTRAVENOUS at 13:50

## 2023-12-24 RX ADMIN — PHENYLEPHRINE HYDROCHLORIDE 200 MCG: 10 INJECTION INTRAVENOUS at 14:06

## 2023-12-24 RX ADMIN — OXYTOCIN 6 MILLI-UNITS/MIN: 10 INJECTION, SOLUTION INTRAMUSCULAR; INTRAVENOUS at 06:26

## 2023-12-24 RX ADMIN — MIDAZOLAM HYDROCHLORIDE 1 MG: 1 INJECTION, SOLUTION INTRAMUSCULAR; INTRAVENOUS at 14:20

## 2023-12-24 RX ADMIN — ONDANSETRON 4 MG: 2 INJECTION INTRAMUSCULAR; INTRAVENOUS at 08:59

## 2023-12-24 RX ADMIN — PHENYLEPHRINE HYDROCHLORIDE 200 MCG: 10 INJECTION INTRAVENOUS at 14:28

## 2023-12-24 RX ADMIN — CEFAZOLIN 2 G: 1 INJECTION, POWDER, FOR SOLUTION INTRAMUSCULAR; INTRAVENOUS at 14:01

## 2023-12-24 RX ADMIN — OXYTOCIN 10 UNITS: 10 INJECTION, SOLUTION INTRAMUSCULAR; INTRAVENOUS at 15:13

## 2023-12-24 RX ADMIN — OXYTOCIN 1000 ML: 10 INJECTION, SOLUTION INTRAMUSCULAR; INTRAVENOUS at 14:14

## 2023-12-24 RX ADMIN — SODIUM CHLORIDE, POTASSIUM CHLORIDE, SODIUM LACTATE AND CALCIUM CHLORIDE: 600; 310; 30; 20 INJECTION, SOLUTION INTRAVENOUS at 08:14

## 2023-12-24 RX ADMIN — TERBUTALINE SULFATE 0.25 MG: 1 INJECTION SUBCUTANEOUS at 13:40

## 2023-12-24 RX ADMIN — MIDAZOLAM HYDROCHLORIDE 1 MG: 1 INJECTION, SOLUTION INTRAMUSCULAR; INTRAVENOUS at 14:41

## 2023-12-24 RX ADMIN — ACETAMINOPHEN 1000 MG: 500 TABLET ORAL at 18:31

## 2023-12-24 RX ADMIN — LIDOCAINE HYDROCHLORIDE,EPINEPHRINE BITARTRATE 3 ML: 15; .005 INJECTION, SOLUTION EPIDURAL; INFILTRATION; INTRACAUDAL; PERINEURAL at 07:43

## 2023-12-24 RX ADMIN — LABETALOL HYDROCHLORIDE 100 MG: 100 TABLET, FILM COATED ORAL at 08:59

## 2023-12-24 RX ADMIN — ROPIVACAINE HYDROCHLORIDE: 2 INJECTION, SOLUTION EPIDURAL; INFILTRATION at 08:16

## 2023-12-24 RX ADMIN — KETOROLAC TROMETHAMINE 15 MG: 30 INJECTION, SOLUTION INTRAMUSCULAR; INTRAVENOUS at 21:11

## 2023-12-24 RX ADMIN — TERBUTALINE SULFATE 0.25 MG: 1 INJECTION, SOLUTION SUBCUTANEOUS at 13:40

## 2023-12-24 RX ADMIN — LIDOCAINE HYDROCHLORIDE,EPINEPHRINE BITARTRATE 10 ML: 20; .005 INJECTION, SOLUTION EPIDURAL; INFILTRATION; INTRACAUDAL; PERINEURAL at 13:52

## 2023-12-24 RX ADMIN — ROPIVACAINE HYDROCHLORIDE 10 ML: 5 INJECTION, SOLUTION EPIDURAL; INFILTRATION; PERINEURAL at 07:43

## 2023-12-24 RX ADMIN — PHENYLEPHRINE HYDROCHLORIDE 200 MCG: 10 INJECTION INTRAVENOUS at 14:23

## 2023-12-24 RX ADMIN — KETOROLAC TROMETHAMINE 30 MG: 30 INJECTION, SOLUTION INTRAMUSCULAR; INTRAVENOUS at 14:44

## 2023-12-24 RX ADMIN — ONDANSETRON 4 MG: 2 INJECTION INTRAMUSCULAR; INTRAVENOUS at 14:06

## 2023-12-24 RX ADMIN — DEXAMETHASONE SODIUM PHOSPHATE 8 MG: 4 INJECTION INTRA-ARTICULAR; INTRALESIONAL; INTRAMUSCULAR; INTRAVENOUS; SOFT TISSUE at 14:38

## 2023-12-24 RX ADMIN — DIPHENHYDRAMINE HYDROCHLORIDE 25 MG: 50 INJECTION, SOLUTION INTRAMUSCULAR; INTRAVENOUS at 22:05

## 2023-12-24 RX ADMIN — LABETALOL HYDROCHLORIDE 200 MG: 100 TABLET, FILM COATED ORAL at 20:36

## 2023-12-24 RX ADMIN — OXYTOCIN 125 ML/HR: 10 INJECTION, SOLUTION INTRAMUSCULAR; INTRAVENOUS at 16:06

## 2023-12-24 RX ADMIN — FENTANYL CITRATE 50 MCG: 50 INJECTION, SOLUTION INTRAMUSCULAR; INTRAVENOUS at 14:23

## 2023-12-24 RX ADMIN — LIDOCAINE HYDROCHLORIDE,EPINEPHRINE BITARTRATE 5 ML: 20; .005 INJECTION, SOLUTION EPIDURAL; INFILTRATION; INTRACAUDAL; PERINEURAL at 14:04

## 2023-12-24 RX ADMIN — LABETALOL HYDROCHLORIDE 100 MG: 100 TABLET, FILM COATED ORAL at 18:31

## 2023-12-24 ASSESSMENT — EDINBURGH POSTNATAL DEPRESSION SCALE (EPDS)
I HAVE BEEN SO UNHAPPY THAT I HAVE HAD DIFFICULTY SLEEPING: NOT AT ALL
I HAVE LOOKED FORWARD WITH ENJOYMENT TO THINGS: AS MUCH AS I EVER DID
I HAVE FELT SCARED OR PANICKY FOR NO GOOD REASON: NO, NOT AT ALL
I HAVE BEEN SO UNHAPPY THAT I HAVE BEEN CRYING: NO, NEVER
I HAVE FELT SAD OR MISERABLE: NOT VERY OFTEN
THE THOUGHT OF HARMING MYSELF HAS OCCURRED TO ME: NEVER
I HAVE BLAMED MYSELF UNNECESSARILY WHEN THINGS WENT WRONG: NOT VERY OFTEN
THINGS HAVE BEEN GETTING ON TOP OF ME: NO, MOST OF THE TIME I HAVE COPED QUITE WELL
I HAVE BEEN ANXIOUS OR WORRIED FOR NO GOOD REASON: NO, NOT AT ALL
I HAVE BEEN ABLE TO LAUGH AND SEE THE FUNNY SIDE OF THINGS: AS MUCH AS I ALWAYS COULD

## 2023-12-24 ASSESSMENT — PAIN DESCRIPTION - PAIN TYPE
TYPE: ACUTE PAIN
TYPE: SURGICAL PAIN
TYPE: ACUTE PAIN
TYPE: SURGICAL PAIN;ACUTE PAIN

## 2023-12-24 ASSESSMENT — PAIN SCALES - GENERAL: PAIN_LEVEL: 0

## 2023-12-24 NOTE — PROGRESS NOTES
2039: Received report from Midny [facility]    2200: SVE: 3/70/-2    2204: Adina RIVAS in room to complete membrane sweep. POC discussed with pt. Orders received.     0616: [place] in room to break water. Andrea DAY claimed membranes have been broken but no water was seen. Towel placed under pt to check for leakage of fluid.     0635: Pt feels an increase in pain and requests an epidural.     0700: Gave report to Tegan [facility].

## 2023-12-24 NOTE — OP REPORT
DATE OF SERVICE: 2023     PREOPERATIVE DIAGNOSES:  1.  Intrauterine pregnancy at 36w4d  2.  Preeclampsia with severe features  3.  Prolonged fetal decelerations  4.  Inability to augment     POSTOPERATIVE DIAGNOSES:  1.  Intrauterine pregnancy at 36w4d  2.  same    PROCEDURE PERFORMED: Primary low transverse  section.     SURGEON: Xiang Choi MD     ASSISTANT: Praveena Mckinnon MD     ANESTHESIA: Epidural     ANESTHESIOLOGIST: Cruz Jeffries MD     SPECIMEN: Cord gases and placenta sent for evaluation     ESTIMATED BLOOD LOSS: 500 mL     FINDINGS:  A viable male infant, weight 2780 g, Apgars of 1, 7 and 9 in vertex    presentation with clear amniotic fluid.  There was a normal uterus,   tubes, and ovaries bilaterally.     COMPLICATIONS:  None.     PROCEDURE:  After appropriate consents were obtained, the patient was taken to the operating room where epidural anesthesia was applied without complications.  The patient was then prepped and draped in the usual sterile manner.  Clamp test on the skin verified adequate anesthesia.  A Pfannenstiel incision was made with a scalpel 3cm superior to the pubic symphysis and extended down to the rectus fascia.  The rectus fascia was incised with the scalpel and tented up. The underlying rectus muscle was  from the fascia first inferiorly and then superiorly using the mir scissors.  The rectus muscle was  bluntly in the midline.  The peritoneum was entered bluntly in the midline. The peritoneum incision was extended superiorly and inferiorly with the Metzenbaum scissors with great care to avoid injury to underlying bowel or bladder.  Abraham retractor was placed. The vesicouterine peritoneum was tented up and entered with Metzenbaum scissors, and a bladder flap was created.  An incision was made into the lower uterine segment transversely and the incision was extended bluntly.  Amniotomy was performed and there was noted to be clear  amniotic fluid. The Infant's head was grasped and delivered atraumatically followed by the remainder of the body without any complications.  The mouth and nares were suctioned. The cord was doubly clamped and cut and the infant was handed off to awaiting neonatology team.  The placenta was then allowed to spontaneously deliver. The uterus was cleared of clots and debris.  The hysterotomy incision was reapproximated with 1-0 Vicryl suture in a running locked fashion.  Hemostasis was noted.  The tubes and ovaries were examined and noted to be normal.  The pelvis was irrigated with normal saline. The pericolic gutters were examined and any blood clots were removed.  The Abraham retractor was removed. The peritoneum was reapproximated with 3-0 Vicryl suture running.  The rectus muscles were examined and hemostatic.  The fascia was reapproximated with 0 Vicryl suture running.  The subcutaneous fat was irrigated and any small bleeders were bovied for hemostasis.  The subcutaneous fat was then reapproximated with 3-0 Vicryl suture running.  The skin was reapproximated with 4-0 Monocryl suture running. Steri strips and a Mepilex dressing were placed.  Sponge, needle, instrument, and lap counts were correct x2.  Patient tolerated the procedure well and went to recovery room in stable condition.        ____________________________________  Xiang Choi MD

## 2023-12-24 NOTE — PROGRESS NOTES
1900: Report received from Dustin [facility].    2000: Dr. Mendez updated on elevated Bps. Orders received.    2035: Orders received to transfer and admit patient for induction of labor.    2039: Report given to Turner [facility].

## 2023-12-24 NOTE — CARE PLAN
The patient is Stable - Low risk of patient condition declining or worsening    Shift Goals  Clinical Goals: Stable VS  Patient Goals: Stay informed  Family Goals: Support    Progress made toward(s) clinical / shift goals:  Progressing    Problem: Knowledge Deficit - L&D  Goal: Patient and family/caregivers will demonstrate understanding of plan of care, disease process/condition, diagnostic tests and medications  Outcome: Progressing  Note: All patient questions and concerns answered.     Problem: Risk for Injury  Goal: Patient and fetus will be free of preventable injury/complications  Outcome: Progressing  Note: Vital signs monitored closely. Non stress test every shift to monitor baby.

## 2023-12-24 NOTE — CARE PLAN
The patient is Stable - Low risk of patient condition declining or worsening    Shift Goals  Clinical Goals: Stable VS  Patient Goals: Stay informed  Family Goals: Support    Progress made toward(s) clinical / shift goals:    Problem: Knowledge Deficit - L&D  Goal: Patient and family/caregivers will demonstrate understanding of plan of care, disease process/condition, diagnostic tests and medications  Outcome: Progressing     Problem: Pain  Goal: Patient's pain will be alleviated or reduced to the patient’s comfort goal  Outcome: Progressing     Problem: Risk for Injury  Goal: Patient and fetus will be free of preventable injury/complications  Outcome: Progressing       Patient is not progressing towards the following goals:

## 2023-12-24 NOTE — ANESTHESIA POSTPROCEDURE EVALUATION
Patient: Mary Jane Prater    Procedure Summary       Date: 23 Room / Location: [facility] OR 01 / SURGERY LABOR AND DELIVERY    Anesthesia Start: 725 Anesthesia Stop:     Procedure:  SECTION, PRIMARY (Abdomen) Diagnosis: (same, maxx)    Surgeons: Xiang Choi M.D. Responsible Provider: Cruz Jeffries M.D.    Anesthesia Type: epidural ASA Status: 3            Final Anesthesia Type: epidural  Last vitals  BP   Blood Pressure: 115/70    Temp   36.5 °C (97.7 °F)    Pulse   90   Resp   17    SpO2   100 %      Anesthesia Post Evaluation    Patient location during evaluation: PACU  Patient participation: complete - patient participated  Level of consciousness: awake and alert  Pain score: 0    Airway patency: patent  Anesthetic complications: no  Cardiovascular status: hemodynamically stable  Respiratory status: acceptable  Hydration status: euvolemic    PONV: none    patient able to participate, but full recovery from regional anesthesia has not occurred and is not expected within the stipulated timeframe for the completion of the evaluation      There were no known notable events for this encounter.     Nurse Pain Score: 0 (NPRS)

## 2023-12-24 NOTE — ANESTHESIA PROCEDURE NOTES
Epidural Block    Date/Time: 12/24/2023 7:35 AM    Performed by: Cruz Jeffries M.D.  Authorized by: Cruz Jeffries M.D.    Patient Location:  OB  Start Time:  12/24/2023 7:35 AM  End Time:  12/24/2023 7:43 AM  Reason for Block: labor analgesia    patient identified, IV checked, site marked, risks and benefits discussed, surgical consent, monitors and equipment checked and pre-op evaluation    Patient Position:  Sitting  Prep: ChloraPrep, patient draped and sterile technique    Monitoring:  Blood pressure, continuous pulse oximetry and heart rate  Approach:  Midline  Location:  L3-L4  Injection Technique:  GERARDO air  Skin infiltration:  Lidocaine  Strength:  1%  Dose:  3ml  Needle Type:  Tuohy  Needle Gauge:  17 G  Needle Length:  3.5 in  Loss of resistance::  5  Catheter Size:  19 G  Catheter at Skin Depth:  8  Test Dose Result:  Negative

## 2023-12-24 NOTE — PROGRESS NOTES
0700 Report received from Turner CRUZ RN. POC for labor discussed. Care assumed.    1344 Provider at bedside. Decision for  section.     1348 Pt transferred from labor room 221 to OR 1.    1412 Delivery of viable male infant with nuchal cord. Apgars .     1459 Pt transferred from OR 1 to PACU 2.    1500 Pt arrived in PACU 2.     1616 PT transferred to postpartum via gurney with infant in arms, FOB at side and belongings in possession.     1635 Report given to Klaudia MORIN RN. Bands verified. Care relinquished.

## 2023-12-24 NOTE — ANESTHESIA PREPROCEDURE EVALUATION
Date: 12/24/23  Procedure: Labor Epidural         Relevant Problems   CARDIAC   (positive) Hypertension affecting pregnancy, third trimester       Physical Exam    Airway   Mallampati: III  TM distance: >3 FB  Neck ROM: full       Cardiovascular    Dental    Pulmonary    Abdominal    Neurological      Other findings: Patient has pre-eclampsia with severe features.              Anesthesia Plan    ASA 3   ASA physical status 3 criteria: preeclampsia with severe features    Plan - epidural   Neuraxial block will be labor analgesia                  Pertinent diagnostic labs and testing reviewed    Informed Consent:    Anesthetic plan and risks discussed with patient.

## 2023-12-24 NOTE — OR SURGEON
Immediate Post OP Note    PreOp Diagnosis: Intrauterine pregnancy at 36 weeks and 4-day gestational age, preeclampsia severe features, prolonged fetal decelerations, inability to augment      PostOp Diagnosis: Same      Procedure(s):   SECTION, PRIMARY - Wound Class: Clean Contaminated    Surgeon(s):  Xiang Choi M.D.    Anesthesiologist/Type of Anesthesia:  Anesthesiologist: Cruz Jeffries M.D./Spinal    Surgical Staff:  Circulator: Tegan Quintanilla R.N.  Scrub Person: Netta Collazo; Tressa Tabares  L&D Circulator Assistant: Aurelia Valiente RSHADY; Lynn Adler R.N.; Lynn Shepherd R.N.  L&D Baby  Nurse: Alicia A Damico, R.N.    Specimens removed if any:  Cord gases and placenta sent for evaluation    Estimated Blood Loss: 500 mL    Findings: Male infant, Apgars of 1, 7 and 9.  Weight 2780 g.  Normal placenta with three-vessel cord.    Complications: None        2023 3:09 PM Xiang Choi M.D.

## 2023-12-24 NOTE — PROGRESS NOTES
"S: The patient is starting to feel contractions.  She denies headache, visual changes, right upper quadrant pain, worsening edema.    O:BP (!) 149/88   Pulse 65   Temp 36.2 °C (97.1 °F) (Temporal)   Resp 16   Ht 1.499 m (4' 11\")   Wt 91.2 kg (201 lb)   SpO2 97%      [place] - irregular  EFM - 130s, moderate variability, + accels  Cx - 4 cm/90%/-2    Reflexes 3+.  No clonus.    AROM attempted, no fluid noted    Pit at 6 mu/min    A/P: 28-year-old -0-1-0 at 36-4/7 weeks with preeclampsia with severe features.  Induction in process status post Cervidil, now on Pitocin.  AROM attempted, but difficult due to patient feeling uncomfortable.  Will plan for epidural and recheck.  Continue Pitocin titration.  Start magnesium sulfate 4 g load 2 g/h for seizure prophylaxis when in active labor.  Anticipate .  "

## 2023-12-24 NOTE — ANESTHESIA TIME REPORT
Anesthesia Start and Stop Event Times       Date Time Event    12/24/2023 0724 Ready for Procedure     0725 Anesthesia Start     1504 Anesthesia Stop          Responsible Staff  12/24/23      Name Role Begin End    Cruz Jeffries M.D. Anesth 0725 1414          Overtime Reason:  no overtime (within assigned shift)    Comments:

## 2023-12-24 NOTE — PROGRESS NOTES
S: Pt is laying in bed upon entry to room. Denies presence of headache or epigastric pain. She has questions about her blood pressure. Family is at bedside.     O:    Vitals:    12/23/23 1700 12/23/23 1900 12/23/23 1945 12/23/23 2000   BP: 131/69  (!) 161/101 (!) 153/105   Pulse: (!) 59  72 66   Resp: 16      Temp: 36.6 °C (97.8 °F) 36.2 °C (97.1 °F)     TempSrc: Temporal Temporal     SpO2:       Weight:       Height:               FHTs:  Baseline 145, pos accels, no decels, moderate variability        [place]: Contractions not present        SVE: will check at time of induction start.     A/P:    1.  IUP @ 36w3d   2.  Cat I FHTs    3.  Preeclampsia- discussed with patient severe features. At this time, plan closer monitoring of Bps.Plan to start induction process. Start IV at this time in event IV push antihypertensive is necessary. Explained to patient concerns and labor process.    SHAHRZAD Marcus, CNM

## 2023-12-25 LAB
ERYTHROCYTE [DISTWIDTH] IN BLOOD BY AUTOMATED COUNT: 44.8 FL (ref 35.9–50)
HCT VFR BLD AUTO: 36.7 % (ref 37–47)
HGB BLD-MCNC: 12.6 G/DL (ref 12–16)
MCH RBC QN AUTO: 30.9 PG (ref 27–33)
MCHC RBC AUTO-ENTMCNC: 34.3 G/DL (ref 32.2–35.5)
MCV RBC AUTO: 90 FL (ref 81.4–97.8)
PLATELET # BLD AUTO: 202 K/UL (ref 164–446)
PMV BLD AUTO: 12.2 FL (ref 9–12.9)
RBC # BLD AUTO: 4.08 M/UL (ref 4.2–5.4)
WBC # BLD AUTO: 18.3 K/UL (ref 4.8–10.8)

## 2023-12-25 PROCEDURE — 700102 HCHG RX REV CODE 250 W/ 637 OVERRIDE(OP): Performed by: ANESTHESIOLOGY

## 2023-12-25 PROCEDURE — 770002 HCHG ROOM/CARE - OB PRIVATE (112)

## 2023-12-25 PROCEDURE — 700102 HCHG RX REV CODE 250 W/ 637 OVERRIDE(OP): Performed by: ADVANCED PRACTICE MIDWIFE

## 2023-12-25 PROCEDURE — 36415 COLL VENOUS BLD VENIPUNCTURE: CPT

## 2023-12-25 PROCEDURE — A9270 NON-COVERED ITEM OR SERVICE: HCPCS | Performed by: ANESTHESIOLOGY

## 2023-12-25 PROCEDURE — 700111 HCHG RX REV CODE 636 W/ 250 OVERRIDE (IP): Performed by: ANESTHESIOLOGY

## 2023-12-25 PROCEDURE — A9270 NON-COVERED ITEM OR SERVICE: HCPCS | Performed by: OBSTETRICS & GYNECOLOGY

## 2023-12-25 PROCEDURE — 85027 COMPLETE CBC AUTOMATED: CPT

## 2023-12-25 PROCEDURE — 700102 HCHG RX REV CODE 250 W/ 637 OVERRIDE(OP): Performed by: OBSTETRICS & GYNECOLOGY

## 2023-12-25 PROCEDURE — A9270 NON-COVERED ITEM OR SERVICE: HCPCS | Performed by: ADVANCED PRACTICE MIDWIFE

## 2023-12-25 RX ORDER — IBUPROFEN 800 MG/1
800 TABLET ORAL EVERY 8 HOURS
Status: DISCONTINUED | OUTPATIENT
Start: 2023-12-26 | End: 2023-12-27 | Stop reason: HOSPADM

## 2023-12-25 RX ORDER — IBUPROFEN 800 MG/1
800 TABLET ORAL EVERY 8 HOURS PRN
Status: DISCONTINUED | OUTPATIENT
Start: 2023-12-29 | End: 2023-12-27 | Stop reason: HOSPADM

## 2023-12-25 RX ADMIN — KETOROLAC TROMETHAMINE 15 MG: 30 INJECTION, SOLUTION INTRAMUSCULAR; INTRAVENOUS at 17:47

## 2023-12-25 RX ADMIN — ACETAMINOPHEN 1000 MG: 500 TABLET ORAL at 07:41

## 2023-12-25 RX ADMIN — DOCUSATE SODIUM 100 MG: 100 CAPSULE, LIQUID FILLED ORAL at 10:50

## 2023-12-25 RX ADMIN — LABETALOL HYDROCHLORIDE 200 MG: 100 TABLET, FILM COATED ORAL at 14:39

## 2023-12-25 RX ADMIN — KETOROLAC TROMETHAMINE 15 MG: 30 INJECTION, SOLUTION INTRAMUSCULAR; INTRAVENOUS at 10:50

## 2023-12-25 RX ADMIN — KETOROLAC TROMETHAMINE 15 MG: 30 INJECTION, SOLUTION INTRAMUSCULAR; INTRAVENOUS at 03:05

## 2023-12-25 RX ADMIN — ACETAMINOPHEN 1000 MG: 500 TABLET ORAL at 12:45

## 2023-12-25 RX ADMIN — ACETAMINOPHEN 1000 MG: 500 TABLET ORAL at 01:14

## 2023-12-25 RX ADMIN — ACETAMINOPHEN 1000 MG: 500 TABLET ORAL at 18:36

## 2023-12-25 RX ADMIN — LABETALOL HYDROCHLORIDE 200 MG: 100 TABLET, FILM COATED ORAL at 06:06

## 2023-12-25 RX ADMIN — LABETALOL HYDROCHLORIDE 200 MG: 100 TABLET, FILM COATED ORAL at 22:07

## 2023-12-25 ASSESSMENT — PAIN DESCRIPTION - PAIN TYPE
TYPE: SURGICAL PAIN;ACUTE PAIN
TYPE: SURGICAL PAIN
TYPE: SURGICAL PAIN;ACUTE PAIN
TYPE: SURGICAL PAIN;ACUTE PAIN

## 2023-12-25 NOTE — PROGRESS NOTES
Assumed care of patient, report at bedside from, Klaudia HSIEH. Assessment completed and WDL. Call light within reach, discussed plan of care, denies pain at the moment. Will continue to monitor.

## 2023-12-25 NOTE — PROGRESS NOTES
Patient BP elevated (see flowsheets). Scheduled labetalol given at this time. Dr. Choi notified. Will re-check patient BP in 15 minutes.    1850- BP still elevated (see flowsheets). Voalted Dr. Choi on BP. Message read, no new orders currently given to this RN. Notified provider of NOC RN.

## 2023-12-25 NOTE — CARE PLAN
The patient is Stable - Low risk of patient condition declining or worsening    Shift Goals  Clinical Goals: Pain control, lochia remain WDL, VSS    Progress made toward(s) clinical / shift goals:  Patient pain well controlled at this time s/p surgery. On bed rest. VSS and WDL. Lochia scant rubra.    Patient is not progressing towards the following goals:

## 2023-12-25 NOTE — CARE PLAN
The patient is Watcher - Medium risk of patient condition declining or worsening    Shift Goals  Clinical Goals: Pain control, lochia remain WDL, VSS  Patient Goals: healthy mom, healthy baby  Family Goals: emotional support    Progress made toward(s) clinical / shift goals:    Problem: Knowledge Deficit - L&D  Goal: Patient and family/caregivers will demonstrate understanding of plan of care, disease process/condition, diagnostic tests and medications  Outcome: Progressing     Problem: Risk for Excess Fluid Volume  Goal: Patient will demonstrate pulse, blood pressure and neurologic signs within expected ranges and without any respiratory complications  Outcome: Progressing  Note: Monitor for signs of hypertension.     Problem: Psychosocial - L&D  Goal: Patient's level of anxiety will decrease  Outcome: Progressing  Goal: Patient will be able to discuss coping skills during hospitalization  Outcome: Progressing  Goal: Patient's ability to re-evaluate and adapt role responsibilities will improve  Outcome: Progressing  Goal: Spiritual and cultural needs incorporated into hospitalization  Outcome: Progressing     Problem: Cardiac Output  Goal: Patient will remain normotensive throughout hospitalization  Outcome: Progressing     Problem: Pain  Goal: Patient's pain will be alleviated or reduced to the patient’s comfort goal  Outcome: Progressing     Problem: Risk for Infection and Impaired Wound Healing  Goal: Patient will remain free from infection  Outcome: Progressing  Goal: Patient's wound/surgical incision will decrease in size and heals properly  Outcome: Progressing     Problem: Risk for Injury  Goal: Patient and fetus will be free of preventable injury/complications  Outcome: Progressing     Problem: Risk for Venous Thromboembolism (VTE)  Goal: VTE prevention measures will be implemented and patient will remain free from VTE  Outcome: Progressing     Problem: Discharge Barriers/Planning  Goal: Patient's continuum  of care needs are met  Outcome: Progressing     Problem: Knowledge Deficit - Standard  Goal: Patient and family/care givers will demonstrate understanding of plan of care, disease process/condition, diagnostic tests and medications  Outcome: Progressing     Problem: Knowledge Deficit -   Goal: Patient/support person will demonstrate understanding regarding  section  Outcome: Progressing     Problem: Respiratory  Goal: Patient will achieve/maintain optimum respiratory ventilation and gas exchange  Outcome: Progressing  Note: Monitor SPO2 and titrate/wean oxygen until stable on room air.        Patient is not progressing towards the following goals:

## 2023-12-25 NOTE — PROGRESS NOTES
GASTON Bingham notified of patients /97, orders received for a one time dose of labetalol 200  and to increase the labetalol to 200 TID.

## 2023-12-25 NOTE — CARE PLAN
The patient is Stable - Low risk of patient condition declining or worsening    Shift Goals  Clinical Goals: pain control, rest, maintain VSS  Patient Goals: healthy mom, healthy baby  Family Goals: emotional support    Progress made toward(s) clinical / shift goals:  Pain controlled with scheduled medications, pt able to ambulate and care for infant. BP's WDL    Patient is not progressing towards the following goals:

## 2023-12-25 NOTE — PROGRESS NOTES
Patient transferred from labor and delivery in [facility] with infant in arms and FOB accompanying with belongings. Two RN verification of infant and parent armbands. Report received from BRYCE Javed RN. Patient oriented to unit and POC including, EPDS, BC, I&O chart, feeding frequencies, safe sleeping practices, and emergency cord use. Assessment completed, fundus firm and palpable, lochia scant rubra. Patient has a salazar catheter with adequate output. Pain management discussed. Pitocin infusing at 125 ml/hr. IV patent, no s/s of infiltration at insertion site. Patient encouraged to call with needs.

## 2023-12-25 NOTE — PROGRESS NOTES
Obstetrics & Gynecology Post-Delivery Progress Note    Date of Service      28 y.o.  s/p  for fetal distress  Delivery date: 2023    Events  Elevated blood pressure, change in labetalol dosing overnight. Infant in nursery with lower body temp.    Subjective  Pain: No  Bleeding: lochia minimal  PO's: taking regular diet  Voiding: without difficulty  Ambulating: yes  Passing flatus: Yes  Feeding: breastfeeding well    Objective  Temp:  [36.2 °C (97.2 °F)-37.3 °C (99.1 °F)] 36.7 °C (98 °F)  Pulse:  [0-100] 67  Resp:  [16-19] 18  BP: ()/() 135/80  SpO2:  [81 %-100 %] 93 %    Physical Exam  General: well  Chest/Breasts: nipples intact   Abdomen: nontender, normal bowel sounds  Fundus: firm and at umbilicus  Incision: dressing clean, dry, intact  Perineum: deferred  Extremities: symmetric, calves nontender    Recent Labs     23  1128 23  0554 23  0010   WBC 9.5 9.5 18.3*   RBC 4.64 4.71 4.08*   HEMOGLOBIN 14.6 14.5 12.6   HEMATOCRIT 41.4 42.4 36.7*   MCV 89.2 90.0 90.0   MCH 31.5 30.8 30.9   RDW 45.1 46.0 44.8   PLATELETCT 219 228 202   MPV 12.1 11.9 12.2   NEUTSPOLYS 70.80 70.10  --    LYMPHOCYTES 22.30 22.00  --    MONOCYTES 5.70 6.70  --    EOSINOPHILS 0.50 0.50  --    BASOPHILS 0.20 0.20  --        Assessment/Plan  Mary Jane Prater is a 28 y.o.yo  s/p postop day #1  s/p  for fetal distress    1. Post care: meeting all goals  2. Hemodynamics: stable  3. Pain: controlled  4. Rh+, Rubella Immune  5. Method of Feeding: plans to breastfeed. Discussed with patient pumping and milk transfer in infants. Lactation consult today.   6. Method of Contraception:  not assessed  7. Disposition: likely home postop day 2    VTE prophylaxis: none indicated

## 2023-12-25 NOTE — LACTATION NOTE
"This note was copied from a baby's chart.  Baby 36.4 weeks- LPI, , baby 20 hours old- no latch, MOB Hx Gest HTN, AMA. Initiated 3 step plan.    3 step plan:  Breastfeed/attempt  Supplement according to Guideline volumes  Pump & hand express  Every 3 hours or sooner if baby cues, feed a minimum 8 or more times in 24 hours    Mother reports baby just bottle fed formula, now asleep- latch not attempted. Pump settings reviewed speed 80/60, suction 35% x 15 minutes then hand express x 2-3 minutes each breast, flange fit 22.5 mm. Mother getting drop, unable to collect. Parents watched Slow Pace Bottle feeding video. MOB watched [employer] \"hand expression\" video, LC provided demo on hand expression (no drops). LC reviewed Supplemental Guidelines 10-20-30, mother voiced understanding on volumes & frequency of feeds.     MOB has [facility] insurance, OP follow-up order placed in Epic. Mother already received personal pump through Benefits.     Information sheets provided with review:  Supplemental Guidelines 10-20-30  NNB Resource sheet  HG pump rental  Your LPI  Storage Guidelines  "

## 2023-12-25 NOTE — CARE PLAN
The patient is Stable - Low risk of patient condition declining or worsening    Shift Goals  Clinical Goals: Pain control, lochia remain WDL, VSS    Progress made toward(s) clinical / shift goals:  Patient VSS and WDL set by MD. Lochia scant to light rubra. Pain controlled. Ambulating and voiding without difficulty.    Patient is not progressing towards the following goals:

## 2023-12-25 NOTE — PROGRESS NOTES
Patient started on hospital grade electric breast pump. Education provided to pt regarding pumping every 3hrs, settings of pump: initial speed setting at 80CPM for 2 minutes then decrease to 60 CPM for remainder of pumping session, suction set at 25% per pt comfort. Pt instructed to pump for 15minutes total every 3hrs. Reviewed cleaning of parts and breastmilk storage, questions answered. Patient also educated to watch [employer] hand expressing video to help maximize milk production. All questions answered at this time.

## 2023-12-25 NOTE — PROGRESS NOTES
0700- Received report from МАРИЯ Morales. Assumed care. 12 hour chart check, MAR and orders reviewed.      0740- Assessment complete. Fundus firm and palpable, lochia scant to light rubra. Pain management and interventions discussed with pt. SO asleep at bedside. POC discussed, pt ambulating and voiding without difficulty. All questions and concerns discussed. No further concerns.

## 2023-12-26 LAB — PATHOLOGY CONSULT NOTE: NORMAL

## 2023-12-26 PROCEDURE — A9270 NON-COVERED ITEM OR SERVICE: HCPCS | Performed by: ADVANCED PRACTICE MIDWIFE

## 2023-12-26 PROCEDURE — 700102 HCHG RX REV CODE 250 W/ 637 OVERRIDE(OP): Performed by: ADVANCED PRACTICE MIDWIFE

## 2023-12-26 PROCEDURE — 700102 HCHG RX REV CODE 250 W/ 637 OVERRIDE(OP): Performed by: OBSTETRICS & GYNECOLOGY

## 2023-12-26 PROCEDURE — A9270 NON-COVERED ITEM OR SERVICE: HCPCS | Performed by: OBSTETRICS & GYNECOLOGY

## 2023-12-26 PROCEDURE — 770002 HCHG ROOM/CARE - OB PRIVATE (112)

## 2023-12-26 RX ORDER — SIMETHICONE 125 MG
125 TABLET,CHEWABLE ORAL 4 TIMES DAILY PRN
Status: DISCONTINUED | OUTPATIENT
Start: 2023-12-26 | End: 2023-12-27 | Stop reason: HOSPADM

## 2023-12-26 RX ADMIN — LABETALOL HYDROCHLORIDE 200 MG: 100 TABLET, FILM COATED ORAL at 06:40

## 2023-12-26 RX ADMIN — ACETAMINOPHEN 1000 MG: 500 TABLET ORAL at 16:23

## 2023-12-26 RX ADMIN — ACETAMINOPHEN 1000 MG: 500 TABLET ORAL at 08:37

## 2023-12-26 RX ADMIN — IBUPROFEN 800 MG: 800 TABLET, FILM COATED ORAL at 02:58

## 2023-12-26 RX ADMIN — LABETALOL HYDROCHLORIDE 200 MG: 100 TABLET, FILM COATED ORAL at 13:46

## 2023-12-26 RX ADMIN — LABETALOL HYDROCHLORIDE 200 MG: 100 TABLET, FILM COATED ORAL at 22:07

## 2023-12-26 RX ADMIN — ACETAMINOPHEN 1000 MG: 500 TABLET ORAL at 00:49

## 2023-12-26 RX ADMIN — IBUPROFEN 800 MG: 800 TABLET, FILM COATED ORAL at 10:42

## 2023-12-26 RX ADMIN — SIMETHICONE 125 MG: 125 TABLET, CHEWABLE ORAL at 22:07

## 2023-12-26 RX ADMIN — ACETAMINOPHEN 1000 MG: 500 TABLET ORAL at 22:07

## 2023-12-26 RX ADMIN — IBUPROFEN 800 MG: 800 TABLET, FILM COATED ORAL at 18:19

## 2023-12-26 RX ADMIN — DOCUSATE SODIUM 100 MG: 100 CAPSULE, LIQUID FILLED ORAL at 22:07

## 2023-12-26 ASSESSMENT — PAIN DESCRIPTION - PAIN TYPE
TYPE: SURGICAL PAIN
TYPE: ACUTE PAIN;SURGICAL PAIN
TYPE: SURGICAL PAIN
TYPE: SURGICAL PAIN

## 2023-12-26 NOTE — PROGRESS NOTES
"POSTOPERATIVE  SECTION PROGRESS NOTE;        PATIENT ID:  NAME:  Mary Jane Prater  MRN:               [id number]  YOB: 1995         28 y.o. female  at 36w4d POD# 2 s/p primary  section for prolonged decelerations      Subjective: Doing well today    Objective:    Vitals:    23 1723 23 2207 23 0156 23 0551   BP: 113/72 132/82 123/64 114/76   Pulse: 81 71 68 70   Resp: 17 18 16 16   Temp: 37.1 °C (98.7 °F) 36.8 °C (98.2 °F) 36.7 °C (98.1 °F) 36.7 °C (98 °F)   TempSrc:  Temporal Temporal Temporal   SpO2: 97% 97% 96% 93%   Weight:       Height:         General: No acute distress, resting comfortably in bed.  HEENT: normocephalic, nontraumatic, PERRLA, EOMI  Cardiovascular: Heart RRR with no murmurs, rubs or gallops. Distal Pulses 2+  Respiratory: symmetric chest expansion, lungs CTA bilaterally with no wheezes rales or rhonci  Abdomen: soft, mildly tender around incision which is clean, dry and intact, fundus firm, +BS  Genitourinary: lochia light, denies excessive vaginal bleeding  Musculoskeletal: strength 5/5 in four extremities  Neuro: non focal with no numbness, tingling or changes in sensation      Recent Labs     23  0010   WBC 18.3*   RBC 4.08*   HEMOGLOBIN 12.6   HEMATOCRIT 36.7*   MCV 90.0   MCH 30.9   RDW 44.8   PLATELETCT 202   MPV 12.2     No results for input(s): \"SODIUM\", \"POTASSIUM\", \"CHLORIDE\", \"CO2\", \"GLUCOSE\", \"BUN\", \"CPKTOTAL\" in the last 72 hours.    Current Meds:   Current Facility-Administered Medications   Medication Dose Frequency Provider Last Rate Last Admin    ibuprofen (Motrin) tablet 800 mg  800 mg Q8HRS Xiang Choi M.D.   800 mg at 23 0258    Followed by    [START ON 2023] ibuprofen (Motrin) tablet 800 mg  800 mg Q8HRS PRN Xiang Choi, M.D.        lactated ringers infusion   Continuous vYrose Mendez M.D. 125 mL/hr at 23 1259 Rate Verify at 23 1259    ropivacaine 0.2 % (Naropin) " injection   Continuous Cruz Jeffries M.D.   New Bag at 12/24/23 0816    oxytocin (Pitocin) 0.02 Units/mL LR (postpartum)  125 mL/hr Continuous Xiang Choi M.D. 125 mL/hr at 12/24/23 1606 125 mL/hr at 12/24/23 1606    lactated ringers (LR) infusion   Continuous Xiang Choi M.D.        oxytocin (Pitocin) infusion (for post delivery)  125 mL/hr Continuous Xiang Choi M.D.        oxytocin (Pitocin) injection 10 Units  10 Units Once PRN Xiang Choi M.D.        electrolyte-A (Plasmalyte-A) infusion 500 mL  500 mL Continuous Cruz Jeffries M.D.        lactated ringers infusion   PRN Xiang Choi M.D.        acetaminophen (Tylenol) tablet 1,000 mg  1,000 mg Q6HRS Xiang Choi M.D.   1,000 mg at 12/26/23 0049    Followed by    [START ON 12/28/2023] acetaminophen (Tylenol) tablet 1,000 mg  1,000 mg Q6HRS PRN Xiang Choi M.D.        oxyCODONE immediate-release (Roxicodone) tablet 5 mg  5 mg Q4HRS PRN Xiang Choi M.D.        oxyCODONE immediate release (Roxicodone) tablet 10 mg  10 mg Q4HRS PRN Xiang Choi M.D.        ondansetron (Zofran) syringe/vial injection 4 mg  4 mg Q6HRS PRN Xiang Choi M.D.        Or    ondansetron (Zofran ODT) dispertab 4 mg  4 mg Q6HRS PRN Xiang Choi M.D.        diphenhydrAMINE (Benadryl) tablet/capsule 25 mg  25 mg Q6HRS PRN Xiang Choi M.D.        Or    diphenhydrAMINE (Benadryl) injection 25 mg  25 mg Q6HRS PRN Xiang Choi M.D.        docusate sodium (Colace) capsule 100 mg  100 mg BID PRN Xiang Choi M.D.   100 mg at 12/25/23 1050    labetalol (Normodyne) tablet 200 mg  200 mg Q8HRS GASTON Marcus   200 mg at 12/25/23 2207    oxytocin (Pitocin) 0.02 Units/mL LR (induction of labor)  0.5-20 zion-units/min Continuous Yvrose Mendez M.D. 48 mL/hr at 12/24/23 1259 16 zion-units/min at 12/24/23 1259   Last reviewed on 12/24/2023  2:07 PM by Lynn Adler R.N.          Assessment:  28 y.o. female  at 36w4d POD# 2 s/p primary low-transverse  section-stable    Plan:   Routine care, increase ambulation  Discharge to home POD 3 if stable    Rey Thibodeaux MD

## 2023-12-26 NOTE — LACTATION NOTE
This note was copied from a baby's chart.  Lactation follow-up visit    Baby's Wt loss 4%, mother encouraged to work 3 step plan.    3 step plan:  Breastfeed/attempt  Supplement according to Guideline volumes  Pump & hand express  Every 3 hours or sooner if baby cues, feed a minimum 8 or more times in 24 hours    LC in room, mother requests LC to assist with latch. LC positioned baby nipple to nose, STS, baby few sucks only- no latch. LC demo on slow pace bottle feeding, FOB bottle feeding 20 ml's of formula. Pump settings reviewed with mother, encouraged mother to continue to work 3 step plan.     LC emailed OP the [facility] for OP breastfeeding follow-up, within 5-7 days from discharge.

## 2023-12-26 NOTE — PROGRESS NOTES
0700- report received from NOC RN. POC discussed including feeding intervals, I+O documentation, latch support, lochia changes, ambulation, infant temperature management including STS and layering, weights, VS intervals, and discharge planning. Medications and other comfort measures discussed. Call light in reach. 3 step feeding plan in place. Latching attempted and paced feeding demonstrated with return demonstration by MOB. Supplementation guideline brought to bedside. Pt asks to continue supplementation with formula instead of donor milk. Formula brought to bedside and education provided on measurement and care. LC notified for further consult.     1530- Report given to МАРИЯ Bustillos to assume pt care.

## 2023-12-26 NOTE — CARE PLAN
The patient is Stable - Low risk of patient condition declining or worsening    Shift Goals  Clinical Goals: lochia WDL  Patient Goals: pain WDL  Family Goals: bond    Progress made toward(s) clinical / shift goals:    Problem: Knowledge Deficit - Postpartum  Goal: Patient will verbalize and demonstrate understanding of self and infant care  Outcome: Progressing     Problem: Infection - Postpartum  Goal: Postpartum patient will be free of signs and symptoms of infection  Outcome: Progressing       Patient is not progressing towards the following goals:

## 2023-12-27 VITALS
DIASTOLIC BLOOD PRESSURE: 83 MMHG | WEIGHT: 201 LBS | RESPIRATION RATE: 18 BRPM | BODY MASS INDEX: 40.52 KG/M2 | SYSTOLIC BLOOD PRESSURE: 118 MMHG | HEIGHT: 59 IN | OXYGEN SATURATION: 97 % | TEMPERATURE: 97.6 F | HEART RATE: 75 BPM

## 2023-12-27 PROBLEM — G89.18 POST-OPERATIVE PAIN: Status: ACTIVE | Noted: 2023-12-27

## 2023-12-27 PROCEDURE — A9270 NON-COVERED ITEM OR SERVICE: HCPCS | Performed by: OBSTETRICS & GYNECOLOGY

## 2023-12-27 PROCEDURE — 700102 HCHG RX REV CODE 250 W/ 637 OVERRIDE(OP): Performed by: OBSTETRICS & GYNECOLOGY

## 2023-12-27 PROCEDURE — 700102 HCHG RX REV CODE 250 W/ 637 OVERRIDE(OP): Performed by: ADVANCED PRACTICE MIDWIFE

## 2023-12-27 PROCEDURE — A9270 NON-COVERED ITEM OR SERVICE: HCPCS | Performed by: ADVANCED PRACTICE MIDWIFE

## 2023-12-27 RX ORDER — LABETALOL 200 MG/1
200 TABLET, FILM COATED ORAL EVERY 8 HOURS
Qty: 90 TABLET | Refills: 0 | Status: SHIPPED | OUTPATIENT
Start: 2023-12-27 | End: 2024-01-12

## 2023-12-27 RX ORDER — IBUPROFEN 800 MG/1
800 TABLET ORAL EVERY 8 HOURS
Qty: 30 TABLET | Refills: 1 | Status: SHIPPED | OUTPATIENT
Start: 2023-12-27 | End: 2024-02-01

## 2023-12-27 RX ADMIN — SIMETHICONE 125 MG: 125 TABLET, CHEWABLE ORAL at 04:08

## 2023-12-27 RX ADMIN — ACETAMINOPHEN 1000 MG: 500 TABLET ORAL at 04:08

## 2023-12-27 RX ADMIN — IBUPROFEN 800 MG: 800 TABLET, FILM COATED ORAL at 01:59

## 2023-12-27 RX ADMIN — ACETAMINOPHEN 1000 MG: 500 TABLET ORAL at 09:59

## 2023-12-27 RX ADMIN — IBUPROFEN 800 MG: 800 TABLET, FILM COATED ORAL at 09:59

## 2023-12-27 RX ADMIN — LABETALOL HYDROCHLORIDE 200 MG: 100 TABLET, FILM COATED ORAL at 06:22

## 2023-12-27 ASSESSMENT — PAIN DESCRIPTION - PAIN TYPE
TYPE: ACUTE PAIN;SURGICAL PAIN
TYPE: ACUTE PAIN;SURGICAL PAIN

## 2023-12-27 NOTE — DISCHARGE SUMMARY
Discharge Summary:     Date of Admission: 2023  Date of Discharge: 23      Admitting diagnosis:    1. Pregnancy @ 36w4d  2. Pre-ecalmpsia      Discharge Diagnosis:   1. Status post  for fetal distress.  2. Pre-eclampsia- good control on labetalol    History reviewed. No pertinent past medical history.  OB History    Para Term  AB Living   2 1   1 1 1   SAB IAB Ectopic Molar Multiple Live Births   1       0 1      # Outcome Date GA Lbr Scooby/2nd Weight Sex Delivery Anes PTL Lv   2  23 36w4d / 01:06 2.79 kg (6 lb 2.4 oz) M CS-LTranv EPI N INDY   1 SAB      SAB        Past Surgical History:   Procedure Laterality Date    PRIMARY C SECTION N/A 2023    Procedure:  SECTION, PRIMARY;  Surgeon: Xiang Choi M.D.;  Location: SURGERY LABOR AND DELIVERY;  Service: Obstetrics    WILBUR BY LAPAROSCOPY  2018    Procedure: WILBUR BY LAPAROSCOPY;  Surgeon: Kwesi Clay M.D.;  Location: SURGERY [facility];  Service: General     Patient has no known allergies.    Patient Active Problem List   Diagnosis    Dyspareunia, female    Herpes    Hypertension affecting pregnancy, third trimester       Hospital Course:   Pt is a 28 y.o. now  who presented on 2023 for elevated BPS. Labor induction performed for pre-eclampsia with severe features. Developed prolonged decelerations and primary c/section was performed.     Postpartum course notable for early ambulation, well managed pain, tolerance of diet, spontaneous voiding, and appropriate feeding of infant. She has remained afebrile and blood pressure has been well controlled on labetalol. All maternal questions and concerns addressed    Physical Exam:  Temp:  [36.3 °C (97.3 °F)-36.8 °C (98.2 °F)] 36.7 °C (98 °F)  Pulse:  [63-75] 69  Resp:  [14-18] 18  BP: (119-143)/(68-95) 138/89  SpO2:  [95 %-97 %] 96 %  Physical Exam  General: well appearing, no apparent distress  Abdomen: soft, nontender,  nondistended  Fundus: firm at level below umbilicus  Incision: dressing clean and dry (one old spot of dried blood-small)  Perineum: Deferred  Extremities: symmetric, no peripheral edema, calves nontender    Current Facility-Administered Medications   Medication Dose    simethicone (Mylicon) chewable tablet 125 mg  125 mg    ibuprofen (Motrin) tablet 800 mg  800 mg    Followed by    [START ON 12/29/2023] ibuprofen (Motrin) tablet 800 mg  800 mg    lactated ringers infusion      ropivacaine 0.2 % (Naropin) injection      oxytocin (Pitocin) 0.02 Units/mL LR (postpartum)  125 mL/hr    lactated ringers (LR) infusion      oxytocin (Pitocin) infusion (for post delivery)  125 mL/hr    oxytocin (Pitocin) injection 10 Units  10 Units    electrolyte-A (Plasmalyte-A) infusion 500 mL  500 mL    lactated ringers infusion      acetaminophen (Tylenol) tablet 1,000 mg  1,000 mg    Followed by    [START ON 12/28/2023] acetaminophen (Tylenol) tablet 1,000 mg  1,000 mg    oxyCODONE immediate-release (Roxicodone) tablet 5 mg  5 mg    oxyCODONE immediate release (Roxicodone) tablet 10 mg  10 mg    ondansetron (Zofran) syringe/vial injection 4 mg  4 mg    Or    ondansetron (Zofran ODT) dispertab 4 mg  4 mg    diphenhydrAMINE (Benadryl) tablet/capsule 25 mg  25 mg    Or    diphenhydrAMINE (Benadryl) injection 25 mg  25 mg    docusate sodium (Colace) capsule 100 mg  100 mg    labetalol (Normodyne) tablet 200 mg  200 mg    oxytocin (Pitocin) 0.02 Units/mL LR (induction of labor)  0.5-20 zion-units/min       Recent Labs     12/25/23  0010   WBC 18.3*   RBC 4.08*   HEMOGLOBIN 12.6   HEMATOCRIT 36.7*   MCV 90.0   MCH 30.9   MCHC 34.3   RDW 44.8   PLATELETCT 202   MPV 12.2         Activity/ Discharge Instructions::   Discharge to home  Pelvic Rest x 6 weeks  No heavy lifting x4 weeks  Call or come to ED for: heavy vaginal bleeding, fever >100.4, severe abdominal pain, severe headache, chest pain, shortness of breath,  N/V, incisional drainage,  or other concerns.       Follow up:  Has post op visit on 1/2 @ [facility]    Discharge Meds:   No current outpatient medications on file.   Ibuprofen and labetalol        Zuleyma Rivers M.D.

## 2023-12-27 NOTE — PROGRESS NOTES
Bedside report received from МАРИЯ Bustillos. Pt in bed resting comfortably at this time. Pt able to get up to restroom and void independently, denies need for pain medication at this time. Pt states that she will call if additional pain medication is needed. Whiteboard updated. POC discussed. Call light within reach. All questions and concerns answered at this time, advised to call for assistance as needed.     Patient encouraged to ambulate to decrease gas pain, will continue to monitor as needed

## 2023-12-27 NOTE — DISCHARGE INSTRUCTIONS
PATIENT DISCHARGE EDUCATION INSTRUCTION SHEET    REASONS TO CALL YOUR OBSTETRICIAN  Persistent fever, shaking, chills (Temperature higher than 100.4) may indicate you have an infection  Heavy bleeding: soaking more than 1 pad per hour; Passing clots an egg-sized clot or bigger may mean you have an postpartum hemorrhage  Foul odor from vagina or bad smelling or discolored discharge or blood  Breast infection (Mastitis symptoms); breast pain, chills, fever, redness or red streaks, may feel flu like symptoms  Urinary pain, burning or frequency  Incision that is not healing, increased redness, swelling, tenderness or pain, or any pus from episiotomy or  site may mean you have an infection  Redness, swelling, warmth, or painful to touch in the calf area of your leg may mean you have a blood clot  Severe or intensified depression, thoughts or feelings of wanting to hurt yourself or someone else   Pain in chest, obstructed breathing or shortness of breath (trouble catching your breath) may mean you are having a postpartum complication. Call your provider immediately   Headache that does not get better, even after taking medicine, a bad headache with vision changes or pain in the upper right area of your belly may mean you have high blood pressure or post birth preeclampsia. Call your provider immediately    HAND WASHING  All family and friends should wash their hands:  Before and after holding the baby  Before feeding the baby  After using the restroom or changing the baby's diaper    WOUND CARE  Ask your physician for additional care instructions. In general:   Incision:  May shower and pat incision dry   Keep the incision clean and dry  There should not be any opening or pus from the incision  Continue to walk at home 3 times a day   Do NOT lift anything heavier than your baby (over 10 pounds)  Encourage family to help participate in care of the  to allow rest and mom time to heal  Continue to  "use gia-bottle until bleeding stops as needed    VAGINAL CARE AND BLEEDING  Nothing inside vagina for 6 weeks:   No sexual intercourse, tampons or douching  Bleeding may continue for 2-4 weeks. Amount and color may vary  Soaking 1 pad or more in an hour for several hours is considered heavy bleeding  Passing large egg sized blood clots can be concerning  If you feel like you have heavy bleeding or are having increasing amount of blood clots call your Obstetrician immediately  If you begin feeling faint upon standing, feeling sick to your stomach, have clammy skin, a really fast heartbeat, have chills, start feeling confused, dizzy, sleepy or weak, or feeling like you're going to faint call your Obstetrician immediately    HYPERTENSION   Preeclampsia or gestational hypertension are types of high blood pressure that only pregnant women can get. It is important for you to be aware of symptoms to seek early intervention and treatment. If you have any of these symptoms immediately call your Obstetrician    Vision changes or blurred vision   Severe headache or pain that is unrelieved with medication and will not go away  Persistent pain in upper abdomen or shoulder   Increased swelling of face, feet, or hands  Difficulty breathing or shortness of breath at rest  Urinating less than usual    URINATION AND BOWEL MOVEMENTS  Eating more fiber (bran cereal, fruits, and vegetables) and drinking plenty of fluids will help to avoid constipation  Urinary frequency and urgency after childbirth is normal  If you experience any urinary pain, burning or frequency call your provider    BIRTH CONTROL  It is possible to become pregnant at any time after delivery and while breastfeeding  Plan to discuss a method of birth control with your physician at your post delivery follow up visit    POSTPARTUM BLUES  During the first few days after birth, you may experience a sense of the \"blues\" which may include impatience, irritability or even " "crying. These feelings come and go quickly. However, as many as 1 in 10 women experience emotional symptoms known as postpartum depression.     POSTPARTUM DEPRESSION    May start as early as the second or third day after delivery or take several weeks or months to develop. Symptoms of \"blues\" are present, but are more intense: Crying spells; loss of appetite; feelings of hopelessness or loss of control; fear of touching the baby; over concern or no concern at all about the baby; little or no concern about your own appearance/caring for yourself; and/or inability to sleep or excessive sleeping. Contact your Obstetrician if you are experiencing any of these symptoms     PREVENTING SHAKEN BABY  If you are angry or stressed, PUT THE BABY IN THE CRIB, step away, take some deep breaths, and wait until you are calm to care for the baby. DO NOT SHAKE THE BABY. You are not alone, call a supporter for help.  [facility] 24/7 crisis call line (550-332-4737) or (1-564.166.5175)  You can also text them, text \"ANSWER\" ([phone])  "

## 2023-12-27 NOTE — LACTATION NOTE
"This note was copied from a baby's chart.  Lactation follow-up visit    Wt loss 5%, couplet to be discharged today. Mother chooses to pump & provided, no longer on 3 step plan, baby not latching. Mother plans to rent HG pump through [employer], pump paperwork given yesterday (reviewed how to fill out- benefits will pay for pump rental). Encouraged mother to pump/hand express 8-10 times in 24 hours, \"supply & demand\". Mother has NNB Resource sheet, recommended mother follow-up with the [facility] for supply issues.     Plan:  Pump & provide, offer EBM + formula to equal volumes needed per day/feed and frequency according to \"Supplemental Guidelines\".   "

## 2023-12-27 NOTE — PROGRESS NOTES
Discharge instruction discussed. She knows where to get her prescription. Emphasized the importance of  screening follow-up test. Questions and concerns have been answered. They have provided car seat for baby. The patient and her infant are both doing well.

## 2023-12-27 NOTE — PROGRESS NOTES
A bedside report received from Talia [facility]SI[facility] @ the change of shift.  Assumed care. Discussed plan of care especially on managing pain. Assessment done. Encouraged ambulation. Call light is within reach. Encouraged to call if with needs. Will check at intervals.

## 2023-12-27 NOTE — CARE PLAN
The patient is Stable - Low risk of patient condition declining or worsening    Shift Goals  Clinical Goals: VSS, fundus firm with light lochia, pain control  Patient Goals: pain WDL  Family Goals: bond    Progress made toward(s) clinical / shift goals:    Problem: Knowledge Deficit - Postpartum  Goal: Patient will verbalize and demonstrate understanding of self and infant care  Outcome: Progressing  Note: MOB unable to successfully latch baby, advised to call for assistance, baby is unable to obtain successful latch, unable to marzena nipple deep enough into mouth, encouraged MOB to keep trying and to keep pumping, also encouraged to be aware of her mental health and the stress and anxiety of breastfeeding, encouraged to keep calling for assistance as needed     Problem: Altered Physiologic Condition  Goal: Patient physiologically stable as evidenced by normal lochia, palpable uterine involution and vitals within normal limits  Outcome: Progressing  Note: Fundus firm with scant lochia, voiding well      Problem: Early Mobilization - Post Surgery  Goal: Early mobilization post surgery  Outcome: Progressing  Note: Advised MOB to ambulate to assist with healing and reduce gas pain       Patient is not progressing towards the following goals:

## 2023-12-28 NOTE — LETTER
2023    To Whom It May Concern:         This is confirmation that Mary Jane Prater was delivered via  section on 2023.She will be off work for 12 weeks for post-op and post-partum recovery period.          If you have any questions please do not hesitate to call us at the phone number listed below.    Sincerely,          [facility]  [phone]

## 2024-01-02 ENCOUNTER — POST PARTUM (OUTPATIENT)
Dept: OBGYN | Facility: CLINIC | Age: 29
End: 2024-01-02
Payer: COMMERCIAL

## 2024-01-02 VITALS — SYSTOLIC BLOOD PRESSURE: 132 MMHG | WEIGHT: 181 LBS | BODY MASS INDEX: 36.56 KG/M2 | DIASTOLIC BLOOD PRESSURE: 82 MMHG

## 2024-01-02 DIAGNOSIS — Z01.30 BLOOD PRESSURE CHECK: ICD-10-CM

## 2024-01-02 PROCEDURE — 3075F SYST BP GE 130 - 139MM HG: CPT | Performed by: STUDENT IN AN ORGANIZED HEALTH CARE EDUCATION/TRAINING PROGRAM

## 2024-01-02 PROCEDURE — 0503F POSTPARTUM CARE VISIT: CPT | Performed by: STUDENT IN AN ORGANIZED HEALTH CARE EDUCATION/TRAINING PROGRAM

## 2024-01-02 PROCEDURE — 3079F DIAST BP 80-89 MM HG: CPT | Performed by: STUDENT IN AN ORGANIZED HEALTH CARE EDUCATION/TRAINING PROGRAM

## 2024-01-02 ASSESSMENT — FIBROSIS 4 INDEX: FIB4 SCORE: 0.88

## 2024-01-02 NOTE — PROGRESS NOTES
"GYN FOLLOW UP VISIT    CC:  No chief complaint on file.       HPI: Patient is a 28 y.o.  who presents today for a BP check.  Section, Primary on 2023 due to fetal distress and pre-eclampsia. She was discharged from the hospital on 2023. She states that she has not been checking her blood pressures at home due to it \"freaking her out.\" She has been taking her labetalol 200mg q8 hours. Denies any lightheaded, dizziness, visual acuity changes, scotoma, RUQ pain, swelling, or ALOC.   Mild discomfort to her , but states the pain is tolerable with OTC ibuprofen.          ROS:   General: denies fever / chills  HEENT: denies sore throat:  CV: denies chest pain:  Repiratory: denies shortness of breath  GI: denies abdominal pain  : denies dysuria:    PFSH:  I personally reviewed the past medical and surgical histories.       PHYSICAL EXAMINATION:  Vital Signs:   Vitals:    24 1258   BP: 132/82   Weight: 181 lb     Body mass index is 36.56 kg/m².    Gen: appears well, NAD  Respiratory: normal effort  Abdomen: Soft, non-tender. Well healing surgical scar without any signs of infection.    ASSESSMENT AND PLAN:  28 y.o.      1. Blood pressure check  Pt status post  on 2023.  Due to preeclamptic symptoms she was discharged on labetalol 200 mg every 8 hours.  She states she has been taking her medication as prescribed.  She has not been checking her blood pressures regularly due to \"it freaking her out.\"  I have encouraged her to check her blood pressures in the morning upon waking up.  We discussed proper ways of checking her blood pressure.  I also discussed with her that if she is having any symptoms including lightheaded, dizziness, headache, visual acuity changes, etc. then she should check her blood pressure to see if it is elevated.  Discussed the importance that if she develops the symptoms or things are getting worse that she goes to the emergency room.  All " of her questions were answered here today.  She agrees with this plan.  Will have her come back for  check as scheduled.      Time spent: 15 minutes      Aliza Corcoran P.A.-C.

## 2024-01-04 ENCOUNTER — APPOINTMENT (OUTPATIENT)
Dept: OBGYN | Facility: CLINIC | Age: 29
End: 2024-01-04
Payer: COMMERCIAL

## 2024-01-10 ENCOUNTER — GYNECOLOGY VISIT (OUTPATIENT)
Dept: OBGYN | Facility: CLINIC | Age: 29
End: 2024-01-10
Payer: COMMERCIAL

## 2024-01-10 VITALS — DIASTOLIC BLOOD PRESSURE: 75 MMHG | WEIGHT: 182 LBS | BODY MASS INDEX: 36.76 KG/M2 | SYSTOLIC BLOOD PRESSURE: 116 MMHG

## 2024-01-10 DIAGNOSIS — Z98.891 S/P C-SECTION: ICD-10-CM

## 2024-01-10 DIAGNOSIS — Z30.011 ENCOUNTER FOR INITIAL PRESCRIPTION OF CONTRACEPTIVE PILLS: ICD-10-CM

## 2024-01-10 DIAGNOSIS — Z09 POSTOP CHECK: ICD-10-CM

## 2024-01-10 PROCEDURE — 3078F DIAST BP <80 MM HG: CPT | Performed by: OBSTETRICS & GYNECOLOGY

## 2024-01-10 PROCEDURE — 99024 POSTOP FOLLOW-UP VISIT: CPT | Performed by: OBSTETRICS & GYNECOLOGY

## 2024-01-10 PROCEDURE — 3074F SYST BP LT 130 MM HG: CPT | Performed by: OBSTETRICS & GYNECOLOGY

## 2024-01-10 RX ORDER — ACETAMINOPHEN AND CODEINE PHOSPHATE 120; 12 MG/5ML; MG/5ML
1 SOLUTION ORAL DAILY
Qty: 28 TABLET | Refills: 11 | Status: SHIPPED | OUTPATIENT
Start: 2024-01-10

## 2024-01-10 ASSESSMENT — FIBROSIS 4 INDEX: FIB4 SCORE: 0.88

## 2024-01-10 NOTE — PROGRESS NOTES
Pt here today for C/s check.  C/s was on 12/24/2023  Currently both bottle and breast  C/o none  Phone # [phone] (home)   Pharmacy verified.

## 2024-01-10 NOTE — PROGRESS NOTES
CC: Post-operative check    Date of Surgery: 2023    HPI: Patient is a 28 y.o. year old  who presents for follow up after a primary  section due to fetal intolerance to labor and pre-eclampsia with severe features. She is recovering well. She reports that her pain is reasonably controlled. She is ambulating well. No problem with urination or stooling. We discussed contraceptive option for once she's cleared at 6 weeks. She would like a prescription for micronor today and was counseled this could not be started sooner than three weeks postpartum. We discussed LARCs and their better efficacy. She was given brochures for these.     ROS:   General: Fever: no  GI: Abdominal pain: appropriate with post-op state  : Vaginal bleeding: minimal     PFSH:  I personally reviewed the past medical and surgical histories.     PHYSICAL EXAMINATION:  Vital Signs:   Vitals:    01/10/24 0952   BP: 116/75   BP Location: Left arm   Patient Position: Sitting   Weight: 182 lb     Appearance/Psychiatric: in no apparent distress and well developed and well nourished  Heart: She does not have edema.  Lungs:Respiratory effort is normal.  Abd: soft, nontender, no rebound or guarding, well healing pfannenstiel incision   Pelvic: deferred    ASSESSMENT AND PLAN:  28 y.o.    Diagnoses and all orders for this visit:    Postop check    Encounter for initial prescription of contraceptive pills  -     norethindrone (MICRONOR) 0.35 MG tablet; Take 1 Tablet by mouth every day.    S/P     Plan:  Normal postoperative course  Counseled on slowly advancing activities. Counseled on pelvic rest until 6 weeks  She desires a prescription for micronor which was sent to the pharmacy today  Return to clinic in 4 weeks     Adrienne Meyers M.D.  1/10/2024

## 2024-01-11 ENCOUNTER — HOSPITAL ENCOUNTER (INPATIENT)
Facility: MEDICAL CENTER | Age: 29
LOS: 2 days | DRG: 776 | End: 2024-01-14
Attending: EMERGENCY MEDICINE | Admitting: OBSTETRICS & GYNECOLOGY
Payer: COMMERCIAL

## 2024-01-11 DIAGNOSIS — R31.9 URINARY TRACT INFECTION WITH HEMATURIA, SITE UNSPECIFIED: ICD-10-CM

## 2024-01-11 DIAGNOSIS — N39.0 URINARY TRACT INFECTION WITH HEMATURIA, SITE UNSPECIFIED: ICD-10-CM

## 2024-01-11 DIAGNOSIS — Z98.891 STATUS POST CESAREAN SECTION: ICD-10-CM

## 2024-01-11 DIAGNOSIS — K65.1 INTRA-ABDOMINAL ABSCESS (HCC): ICD-10-CM

## 2024-01-11 LAB
FLUAV RNA SPEC QL NAA+PROBE: NEGATIVE
FLUBV RNA SPEC QL NAA+PROBE: NEGATIVE
RSV RNA SPEC QL NAA+PROBE: NEGATIVE
SARS-COV-2 RNA RESP QL NAA+PROBE: NOTDETECTED

## 2024-01-11 PROCEDURE — 99285 EMERGENCY DEPT VISIT HI MDM: CPT

## 2024-01-11 PROCEDURE — 0241U HCHG SARS-COV-2 COVID-19 NFCT DS RESP RNA 4 TRGT ED POC: CPT

## 2024-01-11 PROCEDURE — 81001 URINALYSIS AUTO W/SCOPE: CPT

## 2024-01-11 PROCEDURE — 83605 ASSAY OF LACTIC ACID: CPT

## 2024-01-11 PROCEDURE — 36415 COLL VENOUS BLD VENIPUNCTURE: CPT

## 2024-01-11 PROCEDURE — 80053 COMPREHEN METABOLIC PANEL: CPT

## 2024-01-11 PROCEDURE — 85025 COMPLETE CBC W/AUTO DIFF WBC: CPT

## 2024-01-11 PROCEDURE — 87086 URINE CULTURE/COLONY COUNT: CPT

## 2024-01-11 PROCEDURE — 87040 BLOOD CULTURE FOR BACTERIA: CPT | Mod: 91

## 2024-01-11 ASSESSMENT — FIBROSIS 4 INDEX: FIB4 SCORE: 0.88

## 2024-01-12 ENCOUNTER — APPOINTMENT (OUTPATIENT)
Dept: RADIOLOGY | Facility: MEDICAL CENTER | Age: 29
DRG: 776 | End: 2024-01-12
Attending: EMERGENCY MEDICINE
Payer: COMMERCIAL

## 2024-01-12 PROBLEM — K65.1 INTRA-ABDOMINAL ABSCESS (HCC): Status: ACTIVE | Noted: 2024-01-12

## 2024-01-12 PROBLEM — K65.1 POSTOPERATIVE INTRA-ABDOMINAL ABSCESS (HCC): Status: ACTIVE | Noted: 2024-01-12

## 2024-01-12 PROBLEM — T81.43XA POSTOPERATIVE INTRA-ABDOMINAL ABSCESS: Status: ACTIVE | Noted: 2024-01-12

## 2024-01-12 LAB
ALBUMIN SERPL BCP-MCNC: 4 G/DL (ref 3.2–4.9)
ALBUMIN/GLOB SERPL: 1 G/DL
ALP SERPL-CCNC: 145 U/L (ref 30–99)
ALT SERPL-CCNC: 26 U/L (ref 2–50)
ANION GAP SERPL CALC-SCNC: 15 MMOL/L (ref 7–16)
APPEARANCE UR: ABNORMAL
AST SERPL-CCNC: 24 U/L (ref 12–45)
BACTERIA #/AREA URNS HPF: NEGATIVE /HPF
BASOPHILS # BLD AUTO: 0.4 % (ref 0–1.8)
BASOPHILS # BLD: 0.06 K/UL (ref 0–0.12)
BILIRUB SERPL-MCNC: 0.6 MG/DL (ref 0.1–1.5)
BILIRUB UR QL STRIP.AUTO: NEGATIVE
BUN SERPL-MCNC: 15 MG/DL (ref 8–22)
CALCIUM ALBUM COR SERPL-MCNC: 9 MG/DL (ref 8.5–10.5)
CALCIUM SERPL-MCNC: 9 MG/DL (ref 8.5–10.5)
CHLORIDE SERPL-SCNC: 101 MMOL/L (ref 96–112)
CO2 SERPL-SCNC: 20 MMOL/L (ref 20–33)
COLOR UR: YELLOW
CREAT SERPL-MCNC: 0.66 MG/DL (ref 0.5–1.4)
EOSINOPHIL # BLD AUTO: 0.12 K/UL (ref 0–0.51)
EOSINOPHIL NFR BLD: 0.8 % (ref 0–6.9)
EPI CELLS #/AREA URNS HPF: NEGATIVE /HPF
ERYTHROCYTE [DISTWIDTH] IN BLOOD BY AUTOMATED COUNT: 45.5 FL (ref 35.9–50)
GFR SERPLBLD CREATININE-BSD FMLA CKD-EPI: 122 ML/MIN/1.73 M 2
GLOBULIN SER CALC-MCNC: 3.9 G/DL (ref 1.9–3.5)
GLUCOSE SERPL-MCNC: 112 MG/DL (ref 65–99)
GLUCOSE UR STRIP.AUTO-MCNC: NEGATIVE MG/DL
HCT VFR BLD AUTO: 42.6 % (ref 37–47)
HGB BLD-MCNC: 14.6 G/DL (ref 12–16)
HYALINE CASTS #/AREA URNS LPF: ABNORMAL /LPF
IMM GRANULOCYTES # BLD AUTO: 0.09 K/UL (ref 0–0.11)
IMM GRANULOCYTES NFR BLD AUTO: 0.6 % (ref 0–0.9)
KETONES UR STRIP.AUTO-MCNC: NEGATIVE MG/DL
LACTATE SERPL-SCNC: 1 MMOL/L (ref 0.5–2)
LEUKOCYTE ESTERASE UR QL STRIP.AUTO: ABNORMAL
LYMPHOCYTES # BLD AUTO: 1.99 K/UL (ref 1–4.8)
LYMPHOCYTES NFR BLD: 13.5 % (ref 22–41)
MCH RBC QN AUTO: 31.3 PG (ref 27–33)
MCHC RBC AUTO-ENTMCNC: 34.3 G/DL (ref 32.2–35.5)
MCV RBC AUTO: 91.4 FL (ref 81.4–97.8)
MICRO URNS: ABNORMAL
MONOCYTES # BLD AUTO: 0.82 K/UL (ref 0–0.85)
MONOCYTES NFR BLD AUTO: 5.6 % (ref 0–13.4)
NEUTROPHILS # BLD AUTO: 11.66 K/UL (ref 1.82–7.42)
NEUTROPHILS NFR BLD: 79.1 % (ref 44–72)
NITRITE UR QL STRIP.AUTO: NEGATIVE
NRBC # BLD AUTO: 0 K/UL
NRBC BLD-RTO: 0 /100 WBC (ref 0–0.2)
PH UR STRIP.AUTO: 5.5 [PH] (ref 5–8)
PLATELET # BLD AUTO: 460 K/UL (ref 164–446)
PMV BLD AUTO: 9.3 FL (ref 9–12.9)
POTASSIUM SERPL-SCNC: 3.4 MMOL/L (ref 3.6–5.5)
PROT SERPL-MCNC: 7.9 G/DL (ref 6–8.2)
PROT UR QL STRIP: 30 MG/DL
RBC # BLD AUTO: 4.66 M/UL (ref 4.2–5.4)
RBC # URNS HPF: ABNORMAL /HPF
RBC UR QL AUTO: ABNORMAL
SODIUM SERPL-SCNC: 136 MMOL/L (ref 135–145)
SP GR UR STRIP.AUTO: 1.02
UROBILINOGEN UR STRIP.AUTO-MCNC: 1 MG/DL
WBC # BLD AUTO: 14.7 K/UL (ref 4.8–10.8)
WBC #/AREA URNS HPF: ABNORMAL /HPF

## 2024-01-12 PROCEDURE — 700102 HCHG RX REV CODE 250 W/ 637 OVERRIDE(OP): Performed by: OBSTETRICS & GYNECOLOGY

## 2024-01-12 PROCEDURE — A9270 NON-COVERED ITEM OR SERVICE: HCPCS | Performed by: OBSTETRICS & GYNECOLOGY

## 2024-01-12 PROCEDURE — 96375 TX/PRO/DX INJ NEW DRUG ADDON: CPT

## 2024-01-12 PROCEDURE — 99222 1ST HOSP IP/OBS MODERATE 55: CPT | Performed by: OBSTETRICS & GYNECOLOGY

## 2024-01-12 PROCEDURE — 700111 HCHG RX REV CODE 636 W/ 250 OVERRIDE (IP): Mod: JZ

## 2024-01-12 PROCEDURE — 700105 HCHG RX REV CODE 258: Performed by: OBSTETRICS & GYNECOLOGY

## 2024-01-12 PROCEDURE — 87040 BLOOD CULTURE FOR BACTERIA: CPT | Mod: 91

## 2024-01-12 PROCEDURE — 700111 HCHG RX REV CODE 636 W/ 250 OVERRIDE (IP): Mod: JZ | Performed by: OBSTETRICS & GYNECOLOGY

## 2024-01-12 PROCEDURE — 96365 THER/PROPH/DIAG IV INF INIT: CPT

## 2024-01-12 PROCEDURE — 74177 CT ABD & PELVIS W/CONTRAST: CPT

## 2024-01-12 PROCEDURE — 96374 THER/PROPH/DIAG INJ IV PUSH: CPT

## 2024-01-12 PROCEDURE — 770002 HCHG ROOM/CARE - OB PRIVATE (112)

## 2024-01-12 PROCEDURE — 700105 HCHG RX REV CODE 258: Performed by: EMERGENCY MEDICINE

## 2024-01-12 PROCEDURE — 700111 HCHG RX REV CODE 636 W/ 250 OVERRIDE (IP): Mod: JZ | Performed by: EMERGENCY MEDICINE

## 2024-01-12 PROCEDURE — 700117 HCHG RX CONTRAST REV CODE 255: Performed by: EMERGENCY MEDICINE

## 2024-01-12 PROCEDURE — 99232 SBSQ HOSP IP/OBS MODERATE 35: CPT | Performed by: OBSTETRICS & GYNECOLOGY

## 2024-01-12 RX ORDER — CEFTRIAXONE 2 G/1
2000 INJECTION, POWDER, FOR SOLUTION INTRAMUSCULAR; INTRAVENOUS ONCE
Status: COMPLETED | OUTPATIENT
Start: 2024-01-12 | End: 2024-01-12

## 2024-01-12 RX ORDER — SODIUM CHLORIDE 9 MG/ML
1000 INJECTION, SOLUTION INTRAVENOUS ONCE
Status: COMPLETED | OUTPATIENT
Start: 2024-01-12 | End: 2024-01-12

## 2024-01-12 RX ORDER — METRONIDAZOLE 500 MG/100ML
500 INJECTION, SOLUTION INTRAVENOUS EVERY 12 HOURS
Status: DISCONTINUED | OUTPATIENT
Start: 2024-01-12 | End: 2024-01-14 | Stop reason: HOSPADM

## 2024-01-12 RX ORDER — ONDANSETRON 2 MG/ML
4 INJECTION INTRAMUSCULAR; INTRAVENOUS
Status: DISCONTINUED | OUTPATIENT
Start: 2024-01-12 | End: 2024-01-14 | Stop reason: HOSPADM

## 2024-01-12 RX ORDER — ONDANSETRON 2 MG/ML
4 INJECTION INTRAMUSCULAR; INTRAVENOUS ONCE
Status: COMPLETED | OUTPATIENT
Start: 2024-01-12 | End: 2024-01-12

## 2024-01-12 RX ORDER — METRONIDAZOLE 500 MG/1
500 TABLET ORAL EVERY 12 HOURS
Status: DISCONTINUED | OUTPATIENT
Start: 2024-01-12 | End: 2024-01-12

## 2024-01-12 RX ORDER — LABETALOL 200 MG/1
200 TABLET, FILM COATED ORAL DAILY
COMMUNITY
End: 2024-02-01

## 2024-01-12 RX ORDER — METRONIDAZOLE 500 MG/100ML
500 INJECTION, SOLUTION INTRAVENOUS ONCE
Status: COMPLETED | OUTPATIENT
Start: 2024-01-12 | End: 2024-01-12

## 2024-01-12 RX ORDER — ACETAMINOPHEN 500 MG
1000 TABLET ORAL EVERY 6 HOURS PRN
Status: DISCONTINUED | OUTPATIENT
Start: 2024-01-12 | End: 2024-01-14 | Stop reason: HOSPADM

## 2024-01-12 RX ORDER — OXYCODONE HYDROCHLORIDE 5 MG/1
5 TABLET ORAL EVERY 4 HOURS PRN
Status: DISCONTINUED | OUTPATIENT
Start: 2024-01-12 | End: 2024-01-14 | Stop reason: HOSPADM

## 2024-01-12 RX ORDER — SODIUM CHLORIDE 9 MG/ML
INJECTION, SOLUTION INTRAVENOUS CONTINUOUS
Status: DISCONTINUED | OUTPATIENT
Start: 2024-01-12 | End: 2024-01-14 | Stop reason: HOSPADM

## 2024-01-12 RX ORDER — ACETAMINOPHEN 500 MG
500 TABLET ORAL ONCE
Status: COMPLETED | OUTPATIENT
Start: 2024-01-12 | End: 2024-01-12

## 2024-01-12 RX ORDER — IBUPROFEN 800 MG/1
800 TABLET ORAL EVERY 8 HOURS PRN
Status: DISCONTINUED | OUTPATIENT
Start: 2024-01-12 | End: 2024-01-14 | Stop reason: HOSPADM

## 2024-01-12 RX ADMIN — SODIUM CHLORIDE: 9 INJECTION, SOLUTION INTRAVENOUS at 16:24

## 2024-01-12 RX ADMIN — AMPICILLIN AND SULBACTAM 3 G: 1; 2 INJECTION, POWDER, FOR SOLUTION INTRAMUSCULAR; INTRAVENOUS at 23:39

## 2024-01-12 RX ADMIN — OXYCODONE 5 MG: 5 TABLET ORAL at 11:28

## 2024-01-12 RX ADMIN — AMPICILLIN AND SULBACTAM 3 G: 1; 2 INJECTION, POWDER, FOR SOLUTION INTRAMUSCULAR; INTRAVENOUS at 18:03

## 2024-01-12 RX ADMIN — CEFTRIAXONE SODIUM 2000 MG: 2 INJECTION, POWDER, FOR SOLUTION INTRAMUSCULAR; INTRAVENOUS at 00:20

## 2024-01-12 RX ADMIN — IOHEXOL 100 ML: 350 INJECTION, SOLUTION INTRAVENOUS at 02:30

## 2024-01-12 RX ADMIN — METRONIDAZOLE 500 MG: 500 INJECTION, SOLUTION INTRAVENOUS at 16:26

## 2024-01-12 RX ADMIN — IBUPROFEN 800 MG: 800 TABLET, FILM COATED ORAL at 16:18

## 2024-01-12 RX ADMIN — SODIUM CHLORIDE 1000 ML: 9 INJECTION, SOLUTION INTRAVENOUS at 05:05

## 2024-01-12 RX ADMIN — ACETAMINOPHEN 500 MG: 500 TABLET ORAL at 16:43

## 2024-01-12 RX ADMIN — METRONIDAZOLE 500 MG: 500 INJECTION, SOLUTION INTRAVENOUS at 05:05

## 2024-01-12 RX ADMIN — ONDANSETRON 4 MG: 2 INJECTION INTRAMUSCULAR; INTRAVENOUS at 00:28

## 2024-01-12 ASSESSMENT — LIFESTYLE VARIABLES
TOTAL SCORE: 0
ALCOHOL_USE: NO
HAVE PEOPLE ANNOYED YOU BY CRITICIZING YOUR DRINKING: NO
EVER FELT BAD OR GUILTY ABOUT YOUR DRINKING: NO
HAVE PEOPLE ANNOYED YOU BY CRITICIZING YOUR DRINKING: NO
DOES PATIENT WANT TO STOP DRINKING: NO
EVER HAD A DRINK FIRST THING IN THE MORNING TO STEADY YOUR NERVES TO GET RID OF A HANGOVER: NO
ALCOHOL_USE: NO
TOTAL SCORE: 0
CONSUMPTION TOTAL: NEGATIVE
HOW MANY TIMES IN THE PAST YEAR HAVE YOU HAD 5 OR MORE DRINKS IN A DAY: 0
EVER FELT BAD OR GUILTY ABOUT YOUR DRINKING: NO
HAVE YOU EVER FELT YOU SHOULD CUT DOWN ON YOUR DRINKING: NO
AVERAGE NUMBER OF DAYS PER WEEK YOU HAVE A DRINK CONTAINING ALCOHOL: 0
TOTAL SCORE: 0
EVER HAD A DRINK FIRST THING IN THE MORNING TO STEADY YOUR NERVES TO GET RID OF A HANGOVER: NO
HAVE YOU EVER FELT YOU SHOULD CUT DOWN ON YOUR DRINKING: NO
TOTAL SCORE: 0
ON A TYPICAL DAY WHEN YOU DRINK ALCOHOL HOW MANY DRINKS DO YOU HAVE: 0
TOTAL SCORE: 0
CONSUMPTION TOTAL: INCOMPLETE
TOTAL SCORE: 0

## 2024-01-12 ASSESSMENT — PAIN DESCRIPTION - PAIN TYPE
TYPE: ACUTE PAIN
TYPE: ACUTE PAIN
TYPE: ACUTE PAIN;SURGICAL PAIN
TYPE: ACUTE PAIN;SURGICAL PAIN
TYPE: ACUTE PAIN

## 2024-01-12 ASSESSMENT — ENCOUNTER SYMPTOMS
PALPITATIONS: 0
SHORTNESS OF BREATH: 0
WEAKNESS: 0
HEADACHES: 0
NAUSEA: 0
ABDOMINAL PAIN: 0
MYALGIAS: 0
CHILLS: 1
FEVER: 0
VOMITING: 0

## 2024-01-12 NOTE — PROGRESS NOTES
Report received from Madie HSI[facility]. Assumed care of pt. Pt resting comfortably in bed. A/Ox4, VSS, RA and no pain.  All needs met. POC reviewed and white board updated. Call light in reach. Bed locked in lowest position with 2 upper bed rails up. No pain or complaints. Admission questions answered. Skin checked.

## 2024-01-12 NOTE — ED NOTES
Med rec complete per patient  Allergies reviewed.   Patient denies any outpatient antibiotics in the last 30 days.   Anticoagulants taken in the last 14 days? No    Patients preferred pharmacy: [facility]

## 2024-01-12 NOTE — ED NOTES
Pt ambulated to Red 9 with a steady gait. C/C is Post-Op Comp. Pt is on the monitor and call light within reach. Chart up for ERP.  Family at bedside.

## 2024-01-12 NOTE — H&P
ED Gyn Consultation Note/ ADMIT H&P      Consultation performed on behalf of Dr. Neal    Chief Complaint   Patient presents with    Post-Op Complications     3 weeks post CS; started to have chills, night sweats, and fever started 2 weeks ago. States she have body aches but unsure whether it's because of her operation. Was seen by her PCP yesterday and was told her wound looks goods. Denies exposure to any respiratory symptoms.     Mary Jane Prater is a 28 y.o. female presents with history of having a primary  on  for preeclampsia with severe features and failure to progress.  She states she felt well for approximately the first week she was home.  She then began with overall not feeling well in the last 2 to 3 days reports fever and chills.  She denies pain.  She denies abnormal vaginal bleeding or vaginal discharge.  She is using breast pump and also feeding formula.  She denies breast pain redness or warmth.  She denies nausea vomiting diarrhea constipation, shortness of breath chest pain, or any other complaints at this time other than the overall feeling of unwell fever and chills.      Review of systems:  Pertinent positives documented in HPI and a comprehensive review of system is negative oyherwise      Gyn history:   No h/o STI,     History reviewed. No pertinent past medical history.  Past Surgical History:   Procedure Laterality Date    PRIMARY C SECTION N/A 2023    Procedure:  SECTION, PRIMARY;  Surgeon: Xiang Choi M.D.;  Location: [facility]ERY [facility];  Service: Obstetrics    WILBUR BY LAPAROSCOPY  2018    Procedure: WILBUR BY LAPAROSCOPY;  Surgeon: Kwesi Clay M.D.;  Location: SURGERY [facility];  Service: General     OB History    Para Term  AB Living   2 1   1 1 1   SAB IAB Ectopic Molar Multiple Live Births   1       0 1      # Outcome Date GA Lbr Scooby/2nd Weight Sex Delivery Anes PTL Lv   2  23 36w4d /  "01:06 2.79 kg (6 lb 2.4 oz) M CS-LTranv EPI N INDY   1 SAB      SAB            Allergies: Patient has no known allergies.    Current Facility-Administered Medications:     ondansetron (Zofran) syringe/vial injection 4 mg, 4 mg, Intravenous, Once PRN, Lynn Neal D.O.    metroNIDAZOLE (Flagyl) IVPB 500 mg, 500 mg, Intravenous, Q12HRS, Zuleyma Rivers M.D.    ampicillin/sulbactam (Unasyn) 3 g in  mL IVPB, 3 g, Intravenous, Q6HRS, Zuleyma Rivers M.D.    Pt with following problems:  Patient Active Problem List    Diagnosis Date Noted    Intra-abdominal abscess (HCC) 01/12/2024    Post-operative pain 12/27/2023    Hypertension affecting pregnancy, third trimester 12/22/2023    Herpes 11/23/2022    Dyspareunia, female 11/15/2022         Objective:      /82   Pulse 73   Temp 35.9 °C (96.7 °F) (Temporal)   Resp 16   Ht 1.499 m (4' 11\")   Wt 82.8 kg (182 lb 8.7 oz)   SpO2 96%     General:   no acute distress, alert, cooperative, appears fatigued   Skin:   normal, no rashes   HEENT:  PERRLA   Lungs:   normal respiratory effort   Heart:   RRR   Pelvic : performed by ER doc and not repeated   Abdomen:   BS positive, ND, soft, NT x 4   EXT:  NT, no edema     Lab Review  Lab:    Recent Results (from the past 2016 hour(s))   T.PALLIDUM AB JED (SCREENING)    Collection Time: 11/13/23 11:28 AM   Result Value Ref Range    Syphilis, Treponemal Qual Non-Reactive Non-Reactive   GLUCOSE 1HR GESTATIONAL    Collection Time: 11/13/23 11:28 AM   Result Value Ref Range    Glucose, Post Dose 109 70 - 139 mg/dL   CBC WITHOUT DIFFERENTIAL    Collection Time: 11/13/23 11:28 AM   Result Value Ref Range    WBC 13.2 (H) 4.8 - 10.8 K/uL    RBC 4.77 4.20 - 5.40 M/uL    Hemoglobin 14.9 12.0 - 16.0 g/dL    Hematocrit 44.8 37.0 - 47.0 %    MCV 93.9 81.4 - 97.8 fL    MCH 31.2 27.0 - 33.0 pg    MCHC 33.3 32.2 - 35.5 g/dL    RDW 45.2 35.9 - 50.0 fL    Platelet Count 306 164 - 446 K/uL    MPV 10.8 9.0 - 12.9 fL   POCT urinalysis " device results    Collection Time: 11/13/23  4:18 PM   Result Value Ref Range    POC Color Yellow     POC Appearance Clear     POC Glucose Negative Negative mg/dL    POC Ketones Negative Negative mg/dL    POC Specific Gravity 1.015 1.005 - 1.030    POC Blood Negative Negative    POC Urine PH 6.0 5.0 - 8.0    POC Protein Negative Negative mg/dL    POC Nitrites Negative Negative    POC Leukocyte Esterase Small (A) Negative   POCT urinalysis device results    Collection Time: 11/13/23  5:59 PM   Result Value Ref Range    POC Color Yellow     POC Appearance Clear     POC Glucose Negative Negative mg/dL    POC Ketones >=160 (A) Negative mg/dL    POC Specific Gravity >=1.030 (A) 1.005 - 1.030    POC Blood Negative Negative    POC Urine PH 6.0 5.0 - 8.0    POC Protein 100 (A) Negative mg/dL    POC Nitrites Negative Negative    POC Leukocyte Esterase Trace (A) Negative   CBC WITH DIFFERENTIAL    Collection Time: 11/13/23  6:40 PM   Result Value Ref Range    WBC 16.3 (H) 4.8 - 10.8 K/uL    RBC 4.97 4.20 - 5.40 M/uL    Hemoglobin 15.5 12.0 - 16.0 g/dL    Hematocrit 44.7 37.0 - 47.0 %    MCV 89.9 81.4 - 97.8 fL    MCH 31.2 27.0 - 33.0 pg    MCHC 34.7 32.2 - 35.5 g/dL    RDW 43.4 35.9 - 50.0 fL    Platelet Count 305 164 - 446 K/uL    MPV 10.6 9.0 - 12.9 fL    Neutrophils-Polys 87.70 (H) 44.00 - 72.00 %    Lymphocytes 7.20 (L) 22.00 - 41.00 %    Monocytes 4.20 0.00 - 13.40 %    Eosinophils 0.10 0.00 - 6.90 %    Basophils 0.20 0.00 - 1.80 %    Immature Granulocytes 0.60 0.00 - 0.90 %    Nucleated RBC 0.00 0.00 - 0.20 /100 WBC    Neutrophils (Absolute) 14.27 (H) 1.82 - 7.42 K/uL    Lymphs (Absolute) 1.17 1.00 - 4.80 K/uL    Monos (Absolute) 0.69 0.00 - 0.85 K/uL    Eos (Absolute) 0.02 0.00 - 0.51 K/uL    Baso (Absolute) 0.03 0.00 - 0.12 K/uL    Immature Granulocytes (abs) 0.09 0.00 - 0.11 K/uL    NRBC (Absolute) 0.00 K/uL   Comp Metabolic Panel    Collection Time: 11/13/23  6:40 PM   Result Value Ref Range    Sodium 135 135 - 145  mmol/L    Potassium 3.7 3.6 - 5.5 mmol/L    Chloride 103 96 - 112 mmol/L    Co2 16 (L) 20 - 33 mmol/L    Anion Gap 16.0 7.0 - 16.0    Glucose 87 65 - 99 mg/dL    Bun 8 8 - 22 mg/dL    Creatinine 0.54 0.50 - 1.40 mg/dL    Calcium 9.4 8.5 - 10.5 mg/dL    Correct Calcium 9.4 8.5 - 10.5 mg/dL    AST(SGOT) 19 12 - 45 U/L    ALT(SGPT) 15 2 - 50 U/L    Alkaline Phosphatase 160 (H) 30 - 99 U/L    Total Bilirubin 0.7 0.1 - 1.5 mg/dL    Albumin 4.0 3.2 - 4.9 g/dL    Total Protein 7.8 6.0 - 8.2 g/dL    Globulin 3.8 (H) 1.9 - 3.5 g/dL    A-G Ratio 1.1 g/dL   ESTIMATED GFR    Collection Time: 11/13/23  6:40 PM   Result Value Ref Range    GFR (CKD-EPI) 128 >60 mL/min/1.73 m 2   POCT Urinalysis    Collection Time: 11/30/23 11:50 AM   Result Value Ref Range    POC Color yellow Negative    POC Appearance clear Negative    POC Glucose neg Negative mg/dL    POC Bilirubin neg Negative mg/dL    POC Ketones neg Negative mg/dL    POC Specific Gravity 1.025 <1.005 - >1.030    POC Blood neg Negative    POC Urine PH 7.0 5.0 - 8.0    POC Protein 1 Negative mg/dL    POC Urobiligen 0.2 Negative (0.2) mg/dL    POC Nitrites neg Negative    POC Leukocyte Esterase neg Negative   PROTEIN/CREAT RATIO URINE    Collection Time: 11/30/23  1:32 PM   Result Value Ref Range    Total Protein, Urine 12.0 0.0 - 15.0 mg/dL    Creatinine, Random Urine 64.65 mg/dL    Protein Creatinine Ratio 186 (H) 10 - 107 mg/g   POCT urinalysis device results    Collection Time: 11/30/23  1:33 PM   Result Value Ref Range    POC Color Yellow     POC Appearance Clear     POC Glucose Negative Negative mg/dL    POC Ketones Negative Negative mg/dL    POC Specific Gravity 1.020 1.005 - 1.030    POC Blood Negative Negative    POC Urine PH 7.0 5.0 - 8.0    POC Protein Negative Negative mg/dL    POC Nitrites Negative Negative    POC Leukocyte Esterase Small (A) Negative   CBC WITHOUT DIFFERENTIAL    Collection Time: 11/30/23  1:35 PM   Result Value Ref Range    WBC 9.8 4.8 - 10.8 K/uL     RBC 4.50 4.20 - 5.40 M/uL    Hemoglobin 14.0 12.0 - 16.0 g/dL    Hematocrit 40.7 37.0 - 47.0 %    MCV 90.4 81.4 - 97.8 fL    MCH 31.1 27.0 - 33.0 pg    MCHC 34.4 32.2 - 35.5 g/dL    RDW 41.8 35.9 - 50.0 fL    Platelet Count 265 164 - 446 K/uL    MPV 10.6 9.0 - 12.9 fL   Comp Metabolic Panel    Collection Time: 11/30/23  1:35 PM   Result Value Ref Range    Sodium 135 135 - 145 mmol/L    Potassium 4.1 3.6 - 5.5 mmol/L    Chloride 104 96 - 112 mmol/L    Co2 21 20 - 33 mmol/L    Anion Gap 10.0 7.0 - 16.0    Glucose 84 65 - 99 mg/dL    Bun 10 8 - 22 mg/dL    Creatinine 0.59 0.50 - 1.40 mg/dL    Calcium 9.1 8.5 - 10.5 mg/dL    Correct Calcium 9.6 8.5 - 10.5 mg/dL    AST(SGOT) 15 12 - 45 U/L    ALT(SGPT) 14 2 - 50 U/L    Alkaline Phosphatase 186 (H) 30 - 99 U/L    Total Bilirubin 0.3 0.1 - 1.5 mg/dL    Albumin 3.4 3.2 - 4.9 g/dL    Total Protein 7.0 6.0 - 8.2 g/dL    Globulin 3.6 (H) 1.9 - 3.5 g/dL    A-G Ratio 0.9 g/dL   ESTIMATED GFR    Collection Time: 11/30/23  1:35 PM   Result Value Ref Range    GFR (CKD-EPI) 125 >60 mL/min/1.73 m 2   GRP B STREP, BY PCR ([facility])    Collection Time: 12/22/23  9:09 AM    Specimen: Genital   Result Value Ref Range    Strep Gp B DNA PCR Negative Negative   PROTEIN/CREAT RATIO URINE    Collection Time: 12/22/23 10:45 AM   Result Value Ref Range    Total Protein, Urine 48.0 (H) 0.0 - 15.0 mg/dL    Creatinine, Random Urine 63.79 mg/dL    Protein Creatinine Ratio 752 (H) 10 - 107 mg/g   CBC WITH DIFFERENTIAL    Collection Time: 12/22/23 11:28 AM   Result Value Ref Range    WBC 9.5 4.8 - 10.8 K/uL    RBC 4.64 4.20 - 5.40 M/uL    Hemoglobin 14.6 12.0 - 16.0 g/dL    Hematocrit 41.4 37.0 - 47.0 %    MCV 89.2 81.4 - 97.8 fL    MCH 31.5 27.0 - 33.0 pg    MCHC 35.3 32.2 - 35.5 g/dL    RDW 45.1 35.9 - 50.0 fL    Platelet Count 219 164 - 446 K/uL    MPV 12.1 9.0 - 12.9 fL    Neutrophils-Polys 70.80 44.00 - 72.00 %    Lymphocytes 22.30 22.00 - 41.00 %    Monocytes 5.70 0.00 - 13.40 %     Eosinophils 0.50 0.00 - 6.90 %    Basophils 0.20 0.00 - 1.80 %    Immature Granulocytes 0.50 0.00 - 0.90 %    Nucleated RBC 0.00 0.00 - 0.20 /100 WBC    Neutrophils (Absolute) 6.71 1.82 - 7.42 K/uL    Lymphs (Absolute) 2.11 1.00 - 4.80 K/uL    Monos (Absolute) 0.54 0.00 - 0.85 K/uL    Eos (Absolute) 0.05 0.00 - 0.51 K/uL    Baso (Absolute) 0.02 0.00 - 0.12 K/uL    Immature Granulocytes (abs) 0.05 0.00 - 0.11 K/uL    NRBC (Absolute) 0.00 K/uL   Comp Metabolic Panel    Collection Time: 12/22/23 11:28 AM   Result Value Ref Range    Sodium 137 135 - 145 mmol/L    Potassium 3.8 3.6 - 5.5 mmol/L    Chloride 105 96 - 112 mmol/L    Co2 17 (L) 20 - 33 mmol/L    Anion Gap 15.0 7.0 - 16.0    Glucose 139 (H) 65 - 99 mg/dL    Bun 11 8 - 22 mg/dL    Creatinine 0.70 0.50 - 1.40 mg/dL    Calcium 8.9 8.5 - 10.5 mg/dL    Correct Calcium 9.5 8.5 - 10.5 mg/dL    AST(SGOT) 16 12 - 45 U/L    ALT(SGPT) 13 2 - 50 U/L    Alkaline Phosphatase 196 (H) 30 - 99 U/L    Total Bilirubin 0.4 0.1 - 1.5 mg/dL    Albumin 3.3 3.2 - 4.9 g/dL    Total Protein 6.8 6.0 - 8.2 g/dL    Globulin 3.5 1.9 - 3.5 g/dL    A-G Ratio 0.9 g/dL   ESTIMATED GFR    Collection Time: 12/22/23 11:28 AM   Result Value Ref Range    GFR (CKD-EPI) 120 >60 mL/min/1.73 m 2   CBC WITH DIFFERENTIAL    Collection Time: 12/23/23  5:54 AM   Result Value Ref Range    WBC 9.5 4.8 - 10.8 K/uL    RBC 4.71 4.20 - 5.40 M/uL    Hemoglobin 14.5 12.0 - 16.0 g/dL    Hematocrit 42.4 37.0 - 47.0 %    MCV 90.0 81.4 - 97.8 fL    MCH 30.8 27.0 - 33.0 pg    MCHC 34.2 32.2 - 35.5 g/dL    RDW 46.0 35.9 - 50.0 fL    Platelet Count 228 164 - 446 K/uL    MPV 11.9 9.0 - 12.9 fL    Neutrophils-Polys 70.10 44.00 - 72.00 %    Lymphocytes 22.00 22.00 - 41.00 %    Monocytes 6.70 0.00 - 13.40 %    Eosinophils 0.50 0.00 - 6.90 %    Basophils 0.20 0.00 - 1.80 %    Immature Granulocytes 0.50 0.00 - 0.90 %    Nucleated RBC 0.00 0.00 - 0.20 /100 WBC    Neutrophils (Absolute) 6.66 1.82 - 7.42 K/uL    Lymphs (Absolute)  2.09 1.00 - 4.80 K/uL    Monos (Absolute) 0.64 0.00 - 0.85 K/uL    Eos (Absolute) 0.05 0.00 - 0.51 K/uL    Baso (Absolute) 0.02 0.00 - 0.12 K/uL    Immature Granulocytes (abs) 0.05 0.00 - 0.11 K/uL    NRBC (Absolute) 0.00 K/uL   Comp Metabolic Panel    Collection Time: 12/23/23  5:54 AM   Result Value Ref Range    Sodium 134 (L) 135 - 145 mmol/L    Potassium 3.9 3.6 - 5.5 mmol/L    Chloride 104 96 - 112 mmol/L    Co2 17 (L) 20 - 33 mmol/L    Anion Gap 13.0 7.0 - 16.0    Glucose 86 65 - 99 mg/dL    Bun 9 8 - 22 mg/dL    Creatinine 0.79 0.50 - 1.40 mg/dL    Calcium 8.8 8.5 - 10.5 mg/dL    Correct Calcium 9.4 8.5 - 10.5 mg/dL    AST(SGOT) 22 12 - 45 U/L    ALT(SGPT) 12 2 - 50 U/L    Alkaline Phosphatase 194 (H) 30 - 99 U/L    Total Bilirubin 0.5 0.1 - 1.5 mg/dL    Albumin 3.3 3.2 - 4.9 g/dL    Total Protein 6.7 6.0 - 8.2 g/dL    Globulin 3.4 1.9 - 3.5 g/dL    A-G Ratio 1.0 g/dL   ESTIMATED GFR    Collection Time: 12/23/23  5:54 AM   Result Value Ref Range    GFR (CKD-EPI) 104 >60 mL/min/1.73 m 2   URINETOTAL PROTEIN 24 HR    Collection Time: 12/23/23  1:30 PM   Result Value Ref Range    Total Volume, Urine 2700mL mL    Total Protein, Urine 16.0 (H) 0.0 - 15.0 mg/dL    Total Protein, 24 Hour Urine 432.0 (H) 30.0 - 150.0 mg/24 Hr   Histology Request    Collection Time: 12/24/23  2:14 PM   Result Value Ref Range    Pathology Request Sent to Histo    CBC without differential    Collection Time: 12/25/23 12:10 AM   Result Value Ref Range    WBC 18.3 (H) 4.8 - 10.8 K/uL    RBC 4.08 (L) 4.20 - 5.40 M/uL    Hemoglobin 12.6 12.0 - 16.0 g/dL    Hematocrit 36.7 (L) 37.0 - 47.0 %    MCV 90.0 81.4 - 97.8 fL    MCH 30.9 27.0 - 33.0 pg    MCHC 34.3 32.2 - 35.5 g/dL    RDW 44.8 35.9 - 50.0 fL    Platelet Count 202 164 - 446 K/uL    MPV 12.2 9.0 - 12.9 fL   POC CoV-2, FLU A/B, RSV by PCR    Collection Time: 01/11/24 10:43 PM   Result Value Ref Range    POC Influenza A RNA, PCR Negative Negative    POC Influenza B RNA, PCR Negative  Negative    POC RSV, by PCR Negative Negative    POC SARS-CoV-2, PCR NotDetected    URINALYSIS    Collection Time: 01/11/24 11:23 PM    Specimen: Urine   Result Value Ref Range    Color Yellow     Character Cloudy (A)     Specific Gravity 1.025 <1.035    Ph 5.5 5.0 - 8.0    Glucose Negative Negative mg/dL    Ketones Negative Negative mg/dL    Protein 30 (A) Negative mg/dL    Bilirubin Negative Negative    Urobilinogen, Urine 1.0 Negative    Nitrite Negative Negative    Leukocyte Esterase Large (A) Negative    Occult Blood Small (A) Negative    Micro Urine Req Microscopic    URINE MICROSCOPIC (W/UA)    Collection Time: 01/11/24 11:23 PM   Result Value Ref Range    WBC Packed (A) /hpf    RBC 2-5 (A) /hpf    Bacteria Negative None /hpf    Epithelial Cells Negative /hpf    Hyaline Cast 0-2 /lpf   CBC With Differential    Collection Time: 01/11/24 11:45 PM   Result Value Ref Range    WBC 14.7 (H) 4.8 - 10.8 K/uL    RBC 4.66 4.20 - 5.40 M/uL    Hemoglobin 14.6 12.0 - 16.0 g/dL    Hematocrit 42.6 37.0 - 47.0 %    MCV 91.4 81.4 - 97.8 fL    MCH 31.3 27.0 - 33.0 pg    MCHC 34.3 32.2 - 35.5 g/dL    RDW 45.5 35.9 - 50.0 fL    Platelet Count 460 (H) 164 - 446 K/uL    MPV 9.3 9.0 - 12.9 fL    Neutrophils-Polys 79.10 (H) 44.00 - 72.00 %    Lymphocytes 13.50 (L) 22.00 - 41.00 %    Monocytes 5.60 0.00 - 13.40 %    Eosinophils 0.80 0.00 - 6.90 %    Basophils 0.40 0.00 - 1.80 %    Immature Granulocytes 0.60 0.00 - 0.90 %    Nucleated RBC 0.00 0.00 - 0.20 /100 WBC    Neutrophils (Absolute) 11.66 (H) 1.82 - 7.42 K/uL    Lymphs (Absolute) 1.99 1.00 - 4.80 K/uL    Monos (Absolute) 0.82 0.00 - 0.85 K/uL    Eos (Absolute) 0.12 0.00 - 0.51 K/uL    Baso (Absolute) 0.06 0.00 - 0.12 K/uL    Immature Granulocytes (abs) 0.09 0.00 - 0.11 K/uL    NRBC (Absolute) 0.00 K/uL   Comp Metabolic Panel    Collection Time: 01/11/24 11:45 PM   Result Value Ref Range    Sodium 136 135 - 145 mmol/L    Potassium 3.4 (L) 3.6 - 5.5 mmol/L    Chloride 101 96 -  112 mmol/L    Co2 20 20 - 33 mmol/L    Anion Gap 15.0 7.0 - 16.0    Glucose 112 (H) 65 - 99 mg/dL    Bun 15 8 - 22 mg/dL    Creatinine 0.66 0.50 - 1.40 mg/dL    Calcium 9.0 8.5 - 10.5 mg/dL    Correct Calcium 9.0 8.5 - 10.5 mg/dL    AST(SGOT) 24 12 - 45 U/L    ALT(SGPT) 26 2 - 50 U/L    Alkaline Phosphatase 145 (H) 30 - 99 U/L    Total Bilirubin 0.6 0.1 - 1.5 mg/dL    Albumin 4.0 3.2 - 4.9 g/dL    Total Protein 7.9 6.0 - 8.2 g/dL    Globulin 3.9 (H) 1.9 - 3.5 g/dL    A-G Ratio 1.0 g/dL   Lactic Acid    Collection Time: 24 11:45 PM   Result Value Ref Range    Lactic Acid 1.0 0.5 - 2.0 mmol/L   ESTIMATED GFR    Collection Time: 24 11:45 PM   Result Value Ref Range    GFR (CKD-EPI) 122 >60 mL/min/1.73 m 2     CT-ABDOMEN-PELVIS WITH   Final Result         1.  Enhancing fluid collection interposed between the bladder and uterus, appearance most compatible with abscess.   2.  Hepatomegaly      IR-CONSULT AND TREAT    (Results Pending)         Assessment:   28 y.o.  here with post operative fever/chills and pelvic abscess     Plan:   Zosyn  Flagyl  IR Consult for possible percutaneous drainage

## 2024-01-12 NOTE — PROGRESS NOTES
"Gynecology Progress Note    Patient: Mary Jane Prater MRN: [id number]     YOB: 1995  Age: 28 y.o.  Sex: female    Unit: CLINICAL DECISION UNIT Room/Bed: T204/ Location: [facility]     Admitting Physician: ELIJAH GALVEZ  Date of  Admission: 2024     Primary Care Physician: Guy Almaraz M.D.        ADMISSION Diagnoses:  Intra-abdominal abscess (HCC) [K65.1]  Postoperative intra-abdominal abscess [T81.43XA]    SUBJECTIVE:  Mary Jane Prater is a 28 y.o. female  admitted for pelvic abscess post C/S 3 weeks prior for failure to progress and severe preeclampsia. Pain is controlled. The patient is NPO. She is not ambulating. No N/V. Denies fevers in triage.     Review of systems:  Constitutional- Neg   HEENT- Neg  Car - neg  Lungs- Neg  GI- Neg    OBJECTIVE:  Vital Signs: /57   Pulse 78   Temp 36.4 °C (97.5 °F) (Temporal)   Resp 16   Ht 1.499 m (4' 11\")   Wt 82.8 kg (182 lb 8.7 oz)   SpO2 97%   BMI 36.87 kg/m²   Body mass index is 36.87 kg/m².  No intake or output data in the 24 hours ending 24 1130  Appearance/Psychiatric: to be in no distress  Constitutional: The patient is well nourished.  Cardiovascular: RRR  Respiratory: Unlabored  Gastrointestinal: Soft, flat and non-tender and No masses or organomegaly; incision well healed.     LABS:   Latest Reference Range & Units 24 23:45   WBC 4.8 - 10.8 K/uL 14.7 (H)   RBC 4.20 - 5.40 M/uL 4.66   Hemoglobin 12.0 - 16.0 g/dL 14.6   Hematocrit 37.0 - 47.0 % 42.6   MCV 81.4 - 97.8 fL 91.4   MCH 27.0 - 33.0 pg 31.3   MCHC 32.2 - 35.5 g/dL 34.3   RDW 35.9 - 50.0 fL 45.5   Platelet Count 164 - 446 K/uL 460 (H)   (H): Data is abnormally high     Latest Reference Range & Units 24 23:45   Sodium 135 - 145 mmol/L 136   Potassium 3.6 - 5.5 mmol/L 3.4 (L)   Chloride 96 - 112 mmol/L 101   Co2 20 - 33 mmol/L 20   Anion Gap 7.0 - 16.0  15.0   Glucose 65 - 99 mg/dL 112 (H)   Bun 8 - 22 mg/dL 15   Creatinine " 0.50 - 1.40 mg/dL 0.66   GFR (CKD-EPI) >60 mL/min/1.73 m 2 122   (L): Data is abnormally low  (H): Data is abnormally high     Latest Reference Range & Units 24 23:23   Color  Yellow   Character  Cloudy !   Specific Gravity <1.035  1.025   Ph 5.0 - 8.0  5.5   Glucose Negative mg/dL Negative   Ketones Negative mg/dL Negative   Bilirubin Negative  Negative   Occult Blood Negative  Small !   Protein Negative mg/dL 30 !   Nitrite Negative  Negative   Leukocyte Esterase Negative  Large !   Urobilinogen, Urine Negative  1.0   Micro Urine Req  Microscopic   WBC /hpf Packed !   RBC /hpf 2-5 !   Epithelial Cells /hpf Negative   Bacteria None /hpf Negative   Hyaline Cast /lpf 0-2   !: Data is abnormal      DIAGNOSTIC IMAGING:  CT-ABDOMEN-PELVIS WITH   Final Result         1.  Enhancing fluid collection interposed between the bladder and uterus, appearance most compatible with abscess.   2.  Hepatomegaly      Pelvis: No adenopathy. There is enhancing fluid collection interposed between the anterior wall of the uterus and bladder measuring approximately 6.6 x 5.7 x 2.1 cm     ASSESSMENT:   LOS: 0 days ,      Mary Jane Prater is a 28 y.o. female  admitted for pelvic abscess post C/S 3 weeks ago for failure to progress and severe preeclampsia.     Abscess reviewed on CT and discussed with IR clinician Dr. Dia. He deems it is unsafe to drain the abscess via IR guidance due to close proximity between bladder and lower uterine segment. It is in a thin 'crescent' shape which makes it difficult to access safely.     We will continue IV antibiotics for at least 48 hours and if no fevers during this time, may transition to oral antibiotics for outpatient course.     PLAN:     Continue IV unasyn and flagyl.   Daily labs.  Monitor closely for fevers and other signs of sepsis.   Discussed plan with patient and all questions answered.   Will transfer to inpatient bed.   Continue postop care.     Electronically signed  by:   Bharat Ryan M.D.  1/12/2024

## 2024-01-12 NOTE — ED TRIAGE NOTES
Chief Complaint   Patient presents with    Post-Op Complications     3 weeks post CS; started to have chills, night sweats, and fever started 2 weeks ago. States she have body aches but unsure whether it's because of her operation. Was seen by her PCP yesterday and was told her wound looks goods. Denies exposure to any respiratory symptoms.       Pt to triage ambulatory with steady gait for above complaint. Aox4 GCS15. POCT for covid collected. Pt back to lobby, educated on triage process and encourage to alert staff of any changes.     Vitals:    01/11/24 2217   BP: (!) 132/95   Pulse: (!) 115   Resp: 19   Temp: 36.7 °C (98 °F)   SpO2: 95%

## 2024-01-12 NOTE — ED NOTES
Bedside report received from previous shift.   Assumed patient care. Verified patient identification.  Checked on bed, connected to monitor,  with unlabored respirations. Discussed plan of care.   Vital signs is stable. Denied any new complaints.   Gurney in low position, side rail up for pt safety. Call light within reach.   No needs identified at the moment. Instructed to use call light when needed.    Contraptions: IV on right arm  Alert and Oriented: X4  Ambulatory: yes  Oxygen: room air  Pending: Meds and labs

## 2024-01-12 NOTE — ED NOTES
Checked on bed, connected to monitor,awake   with unlabored respirations. Vital signs is stable.   Denied any new complaints. No current needs identified.  Gurney in low position, side rail up for pt safety. Call light within reach.

## 2024-01-12 NOTE — PROGRESS NOTES
4 Eyes Skin Assessment Completed by МАРИЯ Cook and МАРИЯ Huston.    Head WDL  Ears WDL  Nose WDL  Mouth WDL  Neck WDL  Breast/Chest WDL  Shoulder Blades WDL  Spine WDL  (R) Arm/Elbow/Hand WDL  (L) Arm/Elbow/Hand WDL  Abdomen old c/s incision  Groin WDL  Scrotum/Coccyx/Buttocks WDL  (R) Leg WDL  (L) Leg WDL  (R) Heel/Foot/Toe WDL  (L) Heel/Foot/Toe WDL          Devices In Places Blood Pressure Cuff and Pulse Ox      Interventions In Place Pillows and Pressure Redistribution Mattress    Possible Skin Injury No    Pictures Uploaded Into Epic N/A  Wound Consult Placed N/A  RN Wound Prevention Protocol Ordered No

## 2024-01-12 NOTE — PROGRESS NOTES
Report received from night shift RN. Patient A&O, in bed resting comfortably, bed in lowest position and locked, call light in reach.

## 2024-01-12 NOTE — ED NOTES
Checked on bed, connected to monitor,  awake with unlabored respirations. Vital signs is stable.   Denied any new complaints. No current needs identified.  Gurney in low position, side rail up for pt safety. Call light within reach.

## 2024-01-12 NOTE — ED PROVIDER NOTES
ED Provider Note    CHIEF COMPLAINT  Chief Complaint   Patient presents with    Post-Op Complications     3 weeks post CS; started to have chills, night sweats, and fever started 2 weeks ago. States she have body aches but unsure whether it's because of her operation. Was seen by her PCP yesterday and was told her wound looks goods. Denies exposure to any respiratory symptoms.       EXTERNAL RECORDS REVIEWED  Inpatient Notes discharge summary 2023.  Pregnancy at 34 weeks 4 days, preeclampsia, good control on labetalol..  Status post  for fetal distress.    HPI/ROS  LIMITATION TO HISTORY   Select: : None  OUTSIDE HISTORIAN(S):  Family father at bedside but does not contribute to history, excuse for exam    Mary Jane Prater is a 28 y.o. female who presents to the emergency department through triage for fever.  Patient states she has had some intermittent chills, night sweats over the last couple of weeks, but noted to have a fever, 101 via axillary temperature earlier today.  Patient did take Tylenol with improvement.  She is almost 3 weeks status post  from a  delivery at 36+ weeks for preeclampsia.  Was advised by OB/GYN that with normal blood pressures at postop visit yesterday she could stop taking labetalol, but states she had headache, body aches today and was unsure if it was related to high blood pressure and so she did take labetalol today as well.  She has had headaches and bodyaches, increased fatigue today with sweating and the documented fever.    Denies abdominal pain, incisional concerns or discharge.  She does have some dysuria without frequency or urgency.  Rare mild vaginal bleeding improving, no other discharge or mucus.  No flank pain.  No vomiting or diarrhea.  Bowel movements have nearly returned to normal.    Denies cough or congestion, sore throat.  Denies shortness of breath.  Denies breast pain, swelling or redness.  She is pumping milk.    Denies sick  "contacts or travel.    PAST MEDICAL HISTORY   Preeclampsia    SURGICAL HISTORY   has a past surgical history that includes angus by laparoscopy (5/24/2018) and primary c section (N/A, 12/24/2023).    FAMILY HISTORY  Family History   Problem Relation Age of Onset    No Known Problems Mother     Heart Disease Father     Diabetes Father        SOCIAL HISTORY  Social History     Tobacco Use    Smoking status: Never    Smokeless tobacco: Never   Vaping Use    Vaping Use: Never used   Substance and Sexual Activity    Alcohol use: Not Currently    Drug use: No    Sexual activity: Yes     Partners: Male     Birth control/protection: None       CURRENT MEDICATIONS  Home Medications       Reviewed by Madie Ramos R.N. (Registered Nurse) on 01/11/24 at 2233  Med List Status: Not Addressed     Medication Last Dose Status   ibuprofen (MOTRIN) 800 MG Tab  Active   labetalol (NORMODYNE) 200 MG Tab  Active   Misc. Devices (BREAST PUMP) Misc  Active   norethindrone (MICRONOR) 0.35 MG tablet  Active   Prenatal MV-Min-Fe Fum-FA-DHA (PRENATAL 1 PO)  Active                    ALLERGIES  No Known Allergies    PHYSICAL EXAM  VITAL SIGNS: /70   Pulse 89   Temp 36.7 °C (98 °F) (Temporal)   Resp 18   Ht 1.499 m (4' 11\")   Wt 82.8 kg (182 lb 8.7 oz)   SpO2 97%   BMI 36.87 kg/m²    Pulse ox interpretation: I interpret this pulse ox as normal.  Constitutional: Alert in no apparent distress.  HENT: Normocephalic, atraumatic. Bilateral external ears normal, Nose normal. Moist mucous membranes.  Oropharynx within normal limits, no erythema, edema or exudate..  No meningeal irritation.  Eyes: Pupils are equal and reactive, Conjunctiva normal.   Neck: Normal range of motion, Supple, non-tender. No stridor.   Lymphatic: No lymphadenopathy noted.   Cardiovascular: Mild tachycardia otherwise regular rate and rhythm, no murmurs. Distal pulses intact.  No peripheral edema.  Thorax & Lungs: Normal breath sounds.  No wheezing/rales/ronchi. " No increased work of breathing, clipped speech or retractions.   Breasts: Without erythema, warmth, induration or discomfort with palpation.  Abdomen: Soft, non-distended, non-tender to palpation. No palpable or pulsatile masses. No peritoneal signs. No CVA tenderness.  Low transverse abdominal incision is clean, dry and intact without erythema, induration, weeping or discharge.  Skin: Warm, moist, No erythema, No rash or cellulitis  Musculoskeletal: Good range of motion in all major joints.   Neurologic: Alert and oriented x 4.  Moves all extremity spontaneously  Psychiatric: Affect normal, Judgment normal, Mood normal.       DIAGNOSTIC STUDIES / PROCEDURES  LABS  Results for orders placed or performed during the hospital encounter of 01/11/24   URINALYSIS    Specimen: Urine   Result Value Ref Range    Color Yellow     Character Cloudy (A)     Specific Gravity 1.025 <1.035    Ph 5.5 5.0 - 8.0    Glucose Negative Negative mg/dL    Ketones Negative Negative mg/dL    Protein 30 (A) Negative mg/dL    Bilirubin Negative Negative    Urobilinogen, Urine 1.0 Negative    Nitrite Negative Negative    Leukocyte Esterase Large (A) Negative    Occult Blood Small (A) Negative    Micro Urine Req Microscopic    CBC With Differential   Result Value Ref Range    WBC 14.7 (H) 4.8 - 10.8 K/uL    RBC 4.66 4.20 - 5.40 M/uL    Hemoglobin 14.6 12.0 - 16.0 g/dL    Hematocrit 42.6 37.0 - 47.0 %    MCV 91.4 81.4 - 97.8 fL    MCH 31.3 27.0 - 33.0 pg    MCHC 34.3 32.2 - 35.5 g/dL    RDW 45.5 35.9 - 50.0 fL    Platelet Count 460 (H) 164 - 446 K/uL    MPV 9.3 9.0 - 12.9 fL    Neutrophils-Polys 79.10 (H) 44.00 - 72.00 %    Lymphocytes 13.50 (L) 22.00 - 41.00 %    Monocytes 5.60 0.00 - 13.40 %    Eosinophils 0.80 0.00 - 6.90 %    Basophils 0.40 0.00 - 1.80 %    Immature Granulocytes 0.60 0.00 - 0.90 %    Nucleated RBC 0.00 0.00 - 0.20 /100 WBC    Neutrophils (Absolute) 11.66 (H) 1.82 - 7.42 K/uL    Lymphs (Absolute) 1.99 1.00 - 4.80 K/uL    Monos  (Absolute) 0.82 0.00 - 0.85 K/uL    Eos (Absolute) 0.12 0.00 - 0.51 K/uL    Baso (Absolute) 0.06 0.00 - 0.12 K/uL    Immature Granulocytes (abs) 0.09 0.00 - 0.11 K/uL    NRBC (Absolute) 0.00 K/uL   Comp Metabolic Panel   Result Value Ref Range    Sodium 136 135 - 145 mmol/L    Potassium 3.4 (L) 3.6 - 5.5 mmol/L    Chloride 101 96 - 112 mmol/L    Co2 20 20 - 33 mmol/L    Anion Gap 15.0 7.0 - 16.0    Glucose 112 (H) 65 - 99 mg/dL    Bun 15 8 - 22 mg/dL    Creatinine 0.66 0.50 - 1.40 mg/dL    Calcium 9.0 8.5 - 10.5 mg/dL    Correct Calcium 9.0 8.5 - 10.5 mg/dL    AST(SGOT) 24 12 - 45 U/L    ALT(SGPT) 26 2 - 50 U/L    Alkaline Phosphatase 145 (H) 30 - 99 U/L    Total Bilirubin 0.6 0.1 - 1.5 mg/dL    Albumin 4.0 3.2 - 4.9 g/dL    Total Protein 7.9 6.0 - 8.2 g/dL    Globulin 3.9 (H) 1.9 - 3.5 g/dL    A-G Ratio 1.0 g/dL   Lactic Acid   Result Value Ref Range    Lactic Acid 1.0 0.5 - 2.0 mmol/L   URINE MICROSCOPIC (W/UA)   Result Value Ref Range    WBC Packed (A) /hpf    RBC 2-5 (A) /hpf    Bacteria Negative None /hpf    Epithelial Cells Negative /hpf    Hyaline Cast 0-2 /lpf   ESTIMATED GFR   Result Value Ref Range    GFR (CKD-EPI) 122 >60 mL/min/1.73 m 2   POC CoV-2, FLU A/B, RSV by PCR   Result Value Ref Range    POC Influenza A RNA, PCR Negative Negative    POC Influenza B RNA, PCR Negative Negative    POC RSV, by PCR Negative Negative    POC SARS-CoV-2, PCR NotDetected      RADIOLOGY  I have independently interpreted the diagnostic imaging associated with this visit and am waiting the final reading from the radiologist.     Radiologist interpretation:   CT-ABDOMEN-PELVIS WITH   Final Result         1.  Enhancing fluid collection interposed between the bladder and uterus, appearance most compatible with abscess.   2.  Hepatomegaly          COURSE & MEDICAL DECISION MAKING    ED Observation Status? Yes; I am placing the patient in to an observation status due to a diagnostic uncertainty as well as therapeutic intensity.  Patient placed in observation status at 11:26 PM, 1/11/2024.     Observation plan is as follows: Labs, imaging, symptom control before final disposition can be made    Upon Reevaluation, the patient's condition has: not improved; and will be escalated to hospitalization.    Patient discharged from ED Observation status at 3:30 AM  (Time) 1/12/24 (Date).     INITIAL ASSESSMENT, COURSE AND PLAN  Care Narrative:   Seen evaluated bedside.  Abdominal exam is benign.  Mild tachycardia without hypotension.  Afebrile after medication for such prior to arrival.  Warm and moist to touch.  Clinically well-appearing otherwise.  Add labs, imaging for postoperative, postpartum fever.    Urinalysis with large leuk esterase, packed WBCs, negative for nitrate.  Culture pending.  Add Rocephin.  Await labs.  Additionally, urine does have 30+ protein, although blood pressure is quite well-controlled, initially systolic 130s, repeat systolic 110.    RSV, COVID, influenza negative.    Leukocytosis with WBC 14.7, leftward shift without bandemia.  Normal lactate.  No electrolyte derangement.    Given fever, progressive chills, body aches postoperatively and postpartum with leukocytosis add a CT of the abdomen and pelvis.    3:28 AM CT as above with enhancing fluid collection interposed between the bladder and the uterus suggestive of abscess.  After discussion with pharmacy since Rocephin given earlier, add Flagyl.  Page OB/GYN.  Otherwise patient clinically well-appearing, no fever, tachycardia, hypotension.  Clinically not toxic.  Blood pressure well-controlled in the setting of recent preeclampsia.    ADDITIONAL PROBLEM LIST  Postpartum  Preeclampsia -blood pressure normal, trace proteinuria    DISPOSITION AND DISCUSSIONS  I have discussed management of the patient with the following physicians and CHRISTIE's:    3:30 AM OBGYN paged  3:51 AM Dr. Rivers is aware of the patient and agreeable to admission.  Anticipates IR for drainage.   Patient remains n.p.o. in the emergency department.  Admit order and holding orders have been placed as requested.  Patient is aware of the findings and agreeable to the disposition and plan.    Discussion of management with other QHP or appropriate source(s): Pharmacy for antibiotic selection        FINAL DIAGNOSIS  1. Intra-abdominal abscess (HCC)    2. Status post  section    3. Urinary tract infection with hematuria, site unspecified           Electronically signed by: Lynn Neal D.O., 2024 11:21 PM

## 2024-01-12 NOTE — PROGRESS NOTES
Radiology Consult  Author: KAT Chung Date & Time created: 2024  11:54 AM   Date of admission  2024  Note to reader: this note follows the APSO format rather than the historical SOAP format. Assessment and plan located at the top of the note for ease of use.    Chief Complaint  28 y.o. female admitted 2024 with   Chief Complaint   Patient presents with    Post-Op Complications     3 weeks post CS; started to have chills, night sweats, and fever started 2 weeks ago. States she have body aches but unsure whether it's because of her operation. Was seen by her PCP yesterday and was told her wound looks goods. Denies exposure to any respiratory symptoms.       HPI  28 year old female with PMH sig for preeclampsia status post   admitted 24 for fever, chills, night sweats, and general malaise for approximately 1 week. Denies abdominal/pelvic pain. CT abdomen revealed a 6.6 x 5.7 x 2.1 cm fluid collection between the bladder and uterus. IR was consulted to evaluate for drain placement.     Assessment/Plan     Principal Problem:    Intra-abdominal abscess (HCC)  Active Problems:    Postoperative intra-abdominal abscess      Plan IR  -Images reviewed with IR Dr Dia. Unfortunately, the fluid collection is not amenable to drain placement due to proximity of surrounding structures; no safe window. Recommend medical management with abx at this time. Please re consult for any change in condition.     Thank you for allowing Interventional Radiology team to participate in the patients care, if any additonal care or requests are needed in the future please do not hesitate to call or place IR order      [id number]       Review of Systems  Physical Exam   Review of Systems   Constitutional:  Positive for chills and malaise/fatigue. Negative for fever.   Respiratory:  Negative for shortness of breath.    Cardiovascular:  Negative for chest pain and palpitations.  "  Gastrointestinal:  Negative for abdominal pain, nausea and vomiting.   Musculoskeletal:  Negative for myalgias.   Neurological:  Negative for weakness and headaches.      Vitals:    01/12/24 1128   BP:    Pulse:    Resp: 16   Temp:    SpO2:       Physical Exam  Vitals and nursing note reviewed.   Constitutional:       General: She is not in acute distress.     Appearance: Normal appearance.   Cardiovascular:      Rate and Rhythm: Normal rate and regular rhythm.   Pulmonary:      Effort: Pulmonary effort is normal. No respiratory distress.   Abdominal:      Palpations: Abdomen is soft.   Skin:     General: Skin is warm and dry.   Neurological:      General: No focal deficit present.      Mental Status: She is alert and oriented to person, place, and time.   Psychiatric:         Mood and Affect: Mood normal.         Behavior: Behavior normal.             Labs    Recent Labs     01/11/24  2345   WBC 14.7*   RBC 4.66   HEMOGLOBIN 14.6   HEMATOCRIT 42.6   MCV 91.4   MCH 31.3   MCHC 34.3   RDW 45.5   PLATELETCT 460*   MPV 9.3     Recent Labs     01/11/24  2345   SODIUM 136   POTASSIUM 3.4*   CHLORIDE 101   CO2 20   GLUCOSE 112*   BUN 15   CREATININE 0.66   CALCIUM 9.0     CT-ABDOMEN-PELVIS WITH   Final Result         1.  Enhancing fluid collection interposed between the bladder and uterus, appearance most compatible with abscess.   2.  Hepatomegaly        Recent Labs     01/11/24  2345   SODIUM 136   POTASSIUM 3.4*   CHLORIDE 101   CO2 20   GLUCOSE 112*   BUN 15     No results found for: \"INR\"  No results found for: \"POCINR\"   No intake or output data in the 24 hours ending 01/12/24 1154   Labs not explicitly included in this progress note were reviewed by the author. Radiology/imaging not explicitly included in this progress note was reviewed by the author.     History reviewed. No pertinent past medical history.     Home Medications    Medication Sig Taking? Last Dose Authorizing Provider   labetalol (NORMODYNE) 200 " MG Tab Take 200 mg by mouth every day. Yes 1/11/2024 at 1300* Physician Outpatient   norethindrone (MICRONOR) 0.35 MG tablet Take 1 Tablet by mouth every day.  NEW RX at NOT STARTED Adrienne Meyers M.D.   ibuprofen (MOTRIN) 800 MG Tab Take 1 Tablet by mouth every 8 hours.  1/11/2024 at 2100 Zuleyma Rivers M.D.   Misc. Devices (BREAST PUMP) Misc As needed for breastfeeding or milk storage  SUPPLIES at [facility] Ada Ritter P.A.-C.   Prenatal MV-Min-Fe Fum-FA-DHA (PRENATAL 1 PO) Take 1 Tablet by mouth every day.  1/11/2024 at AM Physician Outpatient       I have performed a physical exam and reviewed and updated ROS and Plan today (1/12/2024).     45 minutes in directly providing and coordinating care and extensive data review.  No time overlap and excludes procedures.

## 2024-01-12 NOTE — CONSULTS
Met with patient to discuss pumping while she remains hospitalized.     She is using a bedside hospital grade pump. Appropriate settings, [id number], then down to 60cpm, suction to comfort 20-30%, pump for a total of 15min every three hours. 24mm flanges fit well, she is not having discomfort with pumping. She recently pumped 1oz from each breast.     Pt reports that she plans to pump and dump for now, until she knows what the next steps are. LC will send her a copy of her medications from [facility].     She asked about milk suppression, in case pumping becomes too difficult. Milk suppression techniques provided.     Please place another consult if further lactation assistance is needed.

## 2024-01-13 LAB
ERYTHROCYTE [DISTWIDTH] IN BLOOD BY AUTOMATED COUNT: 44.3 FL (ref 35.9–50)
HCT VFR BLD AUTO: 39 % (ref 37–47)
HGB BLD-MCNC: 13.1 G/DL (ref 12–16)
MCH RBC QN AUTO: 30.6 PG (ref 27–33)
MCHC RBC AUTO-ENTMCNC: 33.6 G/DL (ref 32.2–35.5)
MCV RBC AUTO: 91.1 FL (ref 81.4–97.8)
PLATELET # BLD AUTO: 441 K/UL (ref 164–446)
PMV BLD AUTO: 9.2 FL (ref 9–12.9)
RBC # BLD AUTO: 4.28 M/UL (ref 4.2–5.4)
WBC # BLD AUTO: 13.3 K/UL (ref 4.8–10.8)

## 2024-01-13 PROCEDURE — 85027 COMPLETE CBC AUTOMATED: CPT

## 2024-01-13 PROCEDURE — 700102 HCHG RX REV CODE 250 W/ 637 OVERRIDE(OP): Performed by: OBSTETRICS & GYNECOLOGY

## 2024-01-13 PROCEDURE — 36415 COLL VENOUS BLD VENIPUNCTURE: CPT

## 2024-01-13 PROCEDURE — A9270 NON-COVERED ITEM OR SERVICE: HCPCS | Performed by: OBSTETRICS & GYNECOLOGY

## 2024-01-13 PROCEDURE — 770002 HCHG ROOM/CARE - OB PRIVATE (112)

## 2024-01-13 PROCEDURE — 99232 SBSQ HOSP IP/OBS MODERATE 35: CPT | Performed by: OBSTETRICS & GYNECOLOGY

## 2024-01-13 PROCEDURE — 700111 HCHG RX REV CODE 636 W/ 250 OVERRIDE (IP): Mod: JZ | Performed by: OBSTETRICS & GYNECOLOGY

## 2024-01-13 PROCEDURE — 700105 HCHG RX REV CODE 258: Performed by: OBSTETRICS & GYNECOLOGY

## 2024-01-13 RX ADMIN — AMPICILLIN AND SULBACTAM 3 G: 1; 2 INJECTION, POWDER, FOR SOLUTION INTRAMUSCULAR; INTRAVENOUS at 18:08

## 2024-01-13 RX ADMIN — AMPICILLIN AND SULBACTAM 3 G: 1; 2 INJECTION, POWDER, FOR SOLUTION INTRAMUSCULAR; INTRAVENOUS at 12:07

## 2024-01-13 RX ADMIN — AMPICILLIN AND SULBACTAM 3 G: 1; 2 INJECTION, POWDER, FOR SOLUTION INTRAMUSCULAR; INTRAVENOUS at 06:11

## 2024-01-13 RX ADMIN — METRONIDAZOLE 500 MG: 500 INJECTION, SOLUTION INTRAVENOUS at 18:09

## 2024-01-13 RX ADMIN — IBUPROFEN 800 MG: 800 TABLET, FILM COATED ORAL at 06:33

## 2024-01-13 RX ADMIN — METRONIDAZOLE 500 MG: 500 INJECTION, SOLUTION INTRAVENOUS at 06:31

## 2024-01-13 ASSESSMENT — PAIN DESCRIPTION - PAIN TYPE
TYPE: ACUTE PAIN
TYPE: ACUTE PAIN

## 2024-01-13 NOTE — PROGRESS NOTES
"Gynecology Progress Note    Patient: Mary Jane Prater MRN: [id number]     YOB: 1995  Age: 28 y.o.  Sex: female    Unit: POSTPARTUM [facility] Room/Bed: S330/ Location: [facility]     Admitting Physician: ELIJAH GALVEZ  Date of  Admission: 2024     Primary Care Physician: Guy Almaraz M.D.        ADMISSION Diagnoses:  Intra-abdominal abscess (HCC) [K65.1]  Postoperative intra-abdominal abscess [T81.43XA]    SUBJECTIVE:  Mary Jane Prater is a 28 y.o. female  admitted for intrabdominal abscess 3 weeks s/p 1LTCS. Pain is controlled with ibuprofen. Pain is much better than yesterday. The patient is tolerating regular diet. She is ambulating. Voiding. Feels occasional sweats/chills.   No shortness of breath or chest pain.     Review of systems:  Constitutional- Neg  HEENT- Neg  Cor- Neg  Lungs- Neg  GI- Neg    OBJECTIVE:  Vital Signs: /73   Pulse (!) 105 Comment: rn notified  Temp 37 °C (98.6 °F) (Temporal)   Resp 18   Ht 1.499 m (4' 11\")   Wt 82.8 kg (182 lb 8.7 oz)   SpO2 94%   Breastfeeding Yes   BMI 36.87 kg/m²   Body mass index is 36.87 kg/m².  No intake or output data in the 24 hours ending 24 0721  Appearance/Psychiatric: to be in no distress  Constitutional: The patient is well nourished.  Cardiovascular: RRR  Respiratory: Unlabored  Gastrointestinal: Soft nondistended, nontender, no guarding or rebound.   The incision is well healed.    LABS:   Latest Reference Range & Units 24 23:45 24 04:52   WBC 4.8 - 10.8 K/uL 14.7 (H) 13.3 (H)   RBC 4.20 - 5.40 M/uL 4.66 4.28   Hemoglobin 12.0 - 16.0 g/dL 14.6 13.1   Hematocrit 37.0 - 47.0 % 42.6 39.0   MCV 81.4 - 97.8 fL 91.4 91.1   MCH 27.0 - 33.0 pg 31.3 30.6   MCHC 32.2 - 35.5 g/dL 34.3 33.6   RDW 35.9 - 50.0 fL 45.5 44.3   Platelet Count 164 - 446 K/uL 460 (H) 441   (H): Data is abnormally high      DIAGNOSTIC IMAGING:  CT-ABDOMEN-PELVIS WITH   Final Result         1.  Enhancing fluid " collection interposed between the bladder and uterus, appearance most compatible with abscess.   2.  Hepatomegaly          ASSESSMENT:  HD #2    Mary Jane Prater is a 28 y.o. female  admitted for pelvic abscess post C/S 3 weeks ago for failure to progress and severe preeclampsia.  Doing well.   Low grade temp yesterday noted, resolved with tylenol. No evidence of sepsis, hemodynamically and clinically appears stable.   Leukocytosis improving.   IR was consulted, abscess not able to be safely drained.     PLAN:     Continue IV antibiotics for 48h afebrile.   Monitor for signs/symptoms of sepsis.   Continue postop/postpartum care.     Electronically signed by:   Bharat Ryan M.D.  2024

## 2024-01-13 NOTE — CARE PLAN
The patient is Stable - Low risk of patient condition declining or worsening    Shift Goals  Clinical Goals: Abx therapy, VSS  Patient Goals: remain healthy  Family Goals: support    Progress made toward(s) clinical / shift goals:  VSS    Patient is not progressing towards the following goals:

## 2024-01-13 NOTE — LACTATION NOTE
ELISHA continues to pump and dump; Reviewed that medication she is taking is not contraindicated with breastfeeding. Teach that it is safe to given her infant the EBM. She states she will clean her pump parts and start saving her EBM. CDC guidelines for storage given with discussion. Teach to call RN after pumping to store milk and then take it home as family comes to visit.    She has questions about varying levels of milk pumped with each session and wants to increase her milk production. She states she is pumping every 3 hours and settings reviewed to be WNL. Refer to [employer] Rele[employer] BF videos for maximizing milk production. Teach to hand express following each session for 2-5 min. Demo with return demo. Also refer to hand expression/hands on pumping videos @[employer]. MOB voices understanding.

## 2024-01-13 NOTE — CARE PLAN
The patient is Stable - Low risk of patient condition declining or worsening    Shift Goals  Clinical Goals: Abx therapy, VSS  Patient Goals: remain healthy  Family Goals: support    Progress made toward(s) clinical / shift goals:  pt understands poc and abx therapy       Problem: Knowledge Deficit - Standard  Goal: Patient and family/care givers will demonstrate understanding of plan of care, disease process/condition, diagnostic tests and medications  Outcome: Progressing     Problem: Pain - Standard  Goal: Alleviation of pain or a reduction in pain to the patient’s comfort goal  Outcome: Progressing       Patient is not progressing towards the following goals:

## 2024-01-13 NOTE — LACTATION NOTE
Met with patient following transfer to post-partum floor. The current management of her post-operative abdominal abscess is planned to be non-surgical, and focused on antibiotic therapy. Reviewed with patient that none of her current medications are specifically contraindicated for use in lactation. Offered to provide specific information regarding each medication's safety profile, which patient declines at this time. She is offered the opportunity to ask questions, which are answered to her satisfaction. She will review options and notify lactation if she requires develops any additional questions, or desires further lactation support.

## 2024-01-13 NOTE — PROGRESS NOTES
1511: Report received via phone call from МАРИЯ Morales       1629: Patient with oral temp 100.6. Updated Dr. Ryan. Orders received for 1 time 500 mg dose of tylenol.   Tylenol 1 gram received q 6 hrs PRN for moderate pain.

## 2024-01-13 NOTE — PROGRESS NOTES
Assumed patient care. Pt rates declines pain at this tome. Patient assessment completed. Discussed the emergency call light. POC reviewed with patient all questions answered. Will continue to monitor, call light in reach.

## 2024-01-14 VITALS
HEART RATE: 79 BPM | SYSTOLIC BLOOD PRESSURE: 126 MMHG | DIASTOLIC BLOOD PRESSURE: 84 MMHG | RESPIRATION RATE: 16 BRPM | TEMPERATURE: 98.2 F | WEIGHT: 182.54 LBS | BODY MASS INDEX: 36.8 KG/M2 | HEIGHT: 59 IN | OXYGEN SATURATION: 95 %

## 2024-01-14 LAB
BACTERIA UR CULT: NORMAL
ERYTHROCYTE [DISTWIDTH] IN BLOOD BY AUTOMATED COUNT: 44.4 FL (ref 35.9–50)
HCT VFR BLD AUTO: 39.4 % (ref 37–47)
HGB BLD-MCNC: 12.9 G/DL (ref 12–16)
MCH RBC QN AUTO: 30.4 PG (ref 27–33)
MCHC RBC AUTO-ENTMCNC: 32.7 G/DL (ref 32.2–35.5)
MCV RBC AUTO: 92.7 FL (ref 81.4–97.8)
PLATELET # BLD AUTO: 425 K/UL (ref 164–446)
PMV BLD AUTO: 9.1 FL (ref 9–12.9)
RBC # BLD AUTO: 4.25 M/UL (ref 4.2–5.4)
SIGNIFICANT IND 70042: NORMAL
SITE SITE: NORMAL
SOURCE SOURCE: NORMAL
WBC # BLD AUTO: 10.9 K/UL (ref 4.8–10.8)

## 2024-01-14 PROCEDURE — 700105 HCHG RX REV CODE 258: Performed by: OBSTETRICS & GYNECOLOGY

## 2024-01-14 PROCEDURE — 700102 HCHG RX REV CODE 250 W/ 637 OVERRIDE(OP): Performed by: OBSTETRICS & GYNECOLOGY

## 2024-01-14 PROCEDURE — 36415 COLL VENOUS BLD VENIPUNCTURE: CPT

## 2024-01-14 PROCEDURE — 700111 HCHG RX REV CODE 636 W/ 250 OVERRIDE (IP): Mod: JZ | Performed by: OBSTETRICS & GYNECOLOGY

## 2024-01-14 PROCEDURE — 99232 SBSQ HOSP IP/OBS MODERATE 35: CPT | Performed by: OBSTETRICS & GYNECOLOGY

## 2024-01-14 PROCEDURE — 85027 COMPLETE CBC AUTOMATED: CPT

## 2024-01-14 PROCEDURE — A9270 NON-COVERED ITEM OR SERVICE: HCPCS | Performed by: OBSTETRICS & GYNECOLOGY

## 2024-01-14 RX ORDER — AMOXICILLIN AND CLAVULANATE POTASSIUM 875; 125 MG/1; MG/1
1 TABLET, FILM COATED ORAL 2 TIMES DAILY
Qty: 20 TABLET | Refills: 0 | Status: ACTIVE | OUTPATIENT
Start: 2024-01-14 | End: 2024-01-24

## 2024-01-14 RX ORDER — AMOXICILLIN AND CLAVULANATE POTASSIUM 875; 125 MG/1; MG/1
1 TABLET, FILM COATED ORAL 2 TIMES DAILY
Qty: 20 TABLET | Refills: 0 | Status: ACTIVE | OUTPATIENT
Start: 2024-01-14 | End: 2024-01-14

## 2024-01-14 RX ADMIN — METRONIDAZOLE 500 MG: 500 INJECTION, SOLUTION INTRAVENOUS at 06:27

## 2024-01-14 RX ADMIN — IBUPROFEN 800 MG: 800 TABLET, FILM COATED ORAL at 00:06

## 2024-01-14 RX ADMIN — AMPICILLIN AND SULBACTAM 3 G: 1; 2 INJECTION, POWDER, FOR SOLUTION INTRAMUSCULAR; INTRAVENOUS at 00:03

## 2024-01-14 RX ADMIN — AMPICILLIN AND SULBACTAM 3 G: 1; 2 INJECTION, POWDER, FOR SOLUTION INTRAMUSCULAR; INTRAVENOUS at 05:46

## 2024-01-14 RX ADMIN — AMPICILLIN AND SULBACTAM 3 G: 1; 2 INJECTION, POWDER, FOR SOLUTION INTRAMUSCULAR; INTRAVENOUS at 11:49

## 2024-01-14 ASSESSMENT — PAIN DESCRIPTION - PAIN TYPE
TYPE: ACUTE PAIN

## 2024-01-14 NOTE — LACTATION NOTE
Follow Up:    Pt continues to pump. She reports that she did not pump overnight, and she is concerned that her supply is low. LC encouraged her to pump every three hours to ensure proper stimulation.     Plan: Pump q3h. When discharged, breast feed baby per cues.

## 2024-01-14 NOTE — CARE PLAN
The patient is Stable - Low risk of patient condition declining or worsening    Shift Goals  Clinical Goals: VSS  Patient Goals: remain healthy  Family Goals: support    Progress made toward(s) clinical / shift goals:  VSS    Patient is not progressing towards the following goals:

## 2024-01-14 NOTE — CARE PLAN
Problem: Knowledge Deficit - Standard  Goal: Patient and family/care givers will demonstrate understanding of plan of care, disease process/condition, diagnostic tests and medications  Outcome: Progressing     Problem: Pain - Standard  Goal: Alleviation of pain or a reduction in pain to the patient’s comfort goal  Outcome: Progressing   The patient is Stable - Low risk of patient condition declining or worsening    Shift Goals  Clinical Goals: maintain adequate pain control/ continue antibiotic therapy  Patient Goals:   Family Goals:     Progress made toward(s) clinical / shift goals:  Patient requests to call for pain medication when needed. Patient aware of available prn medication and available nonpharmacologic pain intervention. Scheduled antibiotics given.     Patient is not progressing towards the following goals:

## 2024-01-14 NOTE — PROGRESS NOTES
"Mary Jane Prater  POD 21    Subjective: 20-year-old  2 para 0-1-1-1 status post Primary low-transverse  due to preeclampsia severe features, prolonged fetal decelerations and inability to augment at 36 weeks and 4-day gestational age.  Surgery performed on 2023    Patient readmitted yesterday secondary to possible intra-abdominal abscess.  Patient was febrile at home with significant tenderness in the suprapubic area.    She reports to be feeling better today after the antibiotics was started.  No fevers since late afternoon on 2024      /73   Pulse 73   Temp 35.8 °C (96.5 °F) (Temporal)   Resp 15   Ht 1.499 m (4' 11\")   Wt 82.8 kg (182 lb 8.7 oz)   SpO2 96%   General: Alert and orient x 3, no apparent distress  Abdomen: Incision healing well, no signs of infection.  Mild tenderness to palpation over incision and suprapubic area.  No guarding or rebound  Extremity: No edema  Meds:   No current facility-administered medications on file prior to encounter.     Current Outpatient Medications on File Prior to Encounter   Medication Sig Dispense Refill    labetalol (NORMODYNE) 200 MG Tab Take 200 mg by mouth every day.      norethindrone (MICRONOR) 0.35 MG tablet Take 1 Tablet by mouth every day. 28 Tablet 11    ibuprofen (MOTRIN) 800 MG Tab Take 1 Tablet by mouth every 8 hours. 30 Tablet 1    Misc. Devices (BREAST PUMP) Misc As needed for breastfeeding or milk storage 1 Each 0    Prenatal MV-Min-Fe Fum-FA-DHA (PRENATAL 1 PO) Take 1 Tablet by mouth every day.         Lab:   Recent Results (from the past 48 hour(s))   CBC WITHOUT DIFFERENTIAL    Collection Time: 24  4:52 AM   Result Value Ref Range    WBC 13.3 (H) 4.8 - 10.8 K/uL    RBC 4.28 4.20 - 5.40 M/uL    Hemoglobin 13.1 12.0 - 16.0 g/dL    Hematocrit 39.0 37.0 - 47.0 %    MCV 91.1 81.4 - 97.8 fL    MCH 30.6 27.0 - 33.0 pg    MCHC 33.6 32.2 - 35.5 g/dL    RDW 44.3 35.9 - 50.0 fL    Platelet Count 441 164 - " 446 K/uL    MPV 9.2 9.0 - 12.9 fL   CBC WITHOUT DIFFERENTIAL    Collection Time: 01/14/24  5:39 AM   Result Value Ref Range    WBC 10.9 (H) 4.8 - 10.8 K/uL    RBC 4.25 4.20 - 5.40 M/uL    Hemoglobin 12.9 12.0 - 16.0 g/dL    Hematocrit 39.4 37.0 - 47.0 %    MCV 92.7 81.4 - 97.8 fL    MCH 30.4 27.0 - 33.0 pg    MCHC 32.7 32.2 - 35.5 g/dL    RDW 44.4 35.9 - 50.0 fL    Platelet Count 425 164 - 446 K/uL    MPV 9.1 9.0 - 12.9 fL       Assessment and Plan  1.  Intra-abdominal abscess.  Patient is improving clinically.  No fevers for over 36 hours.  Currently on Unasyn and Flagyl.  Will continue to monitor.  If afebrile for 48 hours, may consider discharge home.  This was discussed with the patient.  Will need close follow-up with repeat imaging next week.  All questions answered

## 2024-01-15 NOTE — DISCHARGE SUMMARY
Discharge Summary    Attending: Dr. Diaz   Intern:  Dr. Mckinnon      CHIEF COMPLAINT ON ADMISSION  Chief Complaint   Patient presents with    Post-Op Complications     3 weeks post CS; started to have chills, night sweats, and fever started 2 weeks ago. States she have body aches but unsure whether it's because of her operation. Was seen by her PCP yesterday and was told her wound looks goods. Denies exposure to any respiratory symptoms.       Reason for Admission  post op complications     Admission Date  2024    CODE STATUS  Full Code    HPI & HOSPITAL COURSE  This is a 28 y.o. female here with fevers and chills 2-3 days prior to admission. On 24 patient had a fever of 101F via axillary temp. Patient recently had a  on 23 for failure to progress and fetal distress.     In the ED patient had a benign abdominal exam with mild tachycardia and hypotension. She was afebrile but had taken Tylenol prior to admission. Her urinalysis was positive for leuokocyte esterase and packed WBC and she received a dose of rocephin. She was RSV, COVID and flu negative. WBC was 14.7. CT abdomen with enhancing fluid collection interposed between the bladder and uterus suggestive of abscess. She was also started on Flagyl.    She was admitted to Gyn services and IR was consulted. The fluid collection was not amenable to drainage and medical management was recommended. She was continued on unasyn and flagyl antibiotics. She remained afebrile with improving symptoms for 48 hours, with last fever on 24 of 100.6F. Blood cultures had no growth to date at time of discharge.    Therefore, she is discharged in good and stable condition to home with close outpatient follow-up.      Discharge Date  24     FOLLOW UP ITEMS POST DISCHARGE  Blood cultures       DISCHARGE DIAGNOSES  Principal Problem:    Intra-abdominal abscess (HCC) (POA: Yes)  Active Problems:    Postoperative intra-abdominal abscess (POA:  Yes)  Resolved Problems:    * No resolved hospital problems. *      FOLLOW UP  Future Appointments   Date Time Provider Department Center   2/1/2024  9:00 AM Opal Jaimes M.D. OBGYN [address]     [facility]  [address]  [place] [address]  [phone]  Follow up        MEDICATIONS ON DISCHARGE     Medication List        START taking these medications        Instructions   amoxicillin-clavulanate 875-125 MG Tabs  Commonly known as: Augmentin   Take 1 Tablet by mouth 2 times a day for 10 days.  Dose: 1 Tablet            CONTINUE taking these medications        Instructions   Breast Pump Misc   Doctor's comments: Z39.1  As needed for breastfeeding or milk storage     ibuprofen 800 MG Tabs  Commonly known as: Motrin   Take 1 Tablet by mouth every 8 hours.  Dose: 800 mg     labetalol 200 MG Tabs  Commonly known as: Normodyne   Take 200 mg by mouth every day.  Dose: 200 mg     norethindrone 0.35 MG tablet  Commonly known as: Micronor   Take 1 Tablet by mouth every day.  Dose: 1 Tablet     PRENATAL 1 PO   Take 1 Tablet by mouth every day.  Dose: 1 Tablet              Allergies  No Known Allergies    DIET  Orders Placed This Encounter   Procedures    Diet Order Diet: Regular     Standing Status:   Standing     Number of Occurrences:   1     Order Specific Question:   Diet:     Answer:   Regular [1]       ACTIVITY  As tolerated.  Weight bearing as tolerated    CONSULTATIONS  IR     PROCEDURES  None     LABORATORY  Lab Results   Component Value Date    SODIUM 136 01/11/2024    POTASSIUM 3.4 (L) 01/11/2024    CHLORIDE 101 01/11/2024    CO2 20 01/11/2024    GLUCOSE 112 (H) 01/11/2024    BUN 15 01/11/2024    CREATININE 0.66 01/11/2024        Lab Results   Component Value Date    WBC 10.9 (H) 01/14/2024    HEMOGLOBIN 12.9 01/14/2024    HEMATOCRIT 39.4 01/14/2024    PLATELETCT 425 01/14/2024

## 2024-01-15 NOTE — DISCHARGE INSTRUCTIONS
Call or come to ED for: heavy vaginal bleeding, fever >100.4, severe abdominal pain, severe headache, chest pain, shortness of breath,  N/V, incisional drainage, or other concerns.  PATIENT DISCHARGE EDUCATION INSTRUCTION SHEET    REASONS TO CALL YOUR OBSTETRICIAN  Persistent fever, shaking, chills (Temperature higher than 100.4) may indicate you have an infection  Heavy bleeding: soaking more than 1 pad per hour; Passing clots an egg-sized clot or bigger may mean you have an postpartum hemorrhage  Foul odor from vagina or bad smelling or discolored discharge or blood  Breast infection (Mastitis symptoms); breast pain, chills, fever, redness or red streaks, may feel flu like symptoms  Urinary pain, burning or frequency  Incision that is not healing, increased redness, swelling, tenderness or pain, or any pus from episiotomy or  site may mean you have an infection  Redness, swelling, warmth, or painful to touch in the calf area of your leg may mean you have a blood clot  Severe or intensified depression, thoughts or feelings of wanting to hurt yourself or someone else   Pain in chest, obstructed breathing or shortness of breath (trouble catching your breath) may mean you are having a postpartum complication. Call your provider immediately   Headache that does not get better, even after taking medicine, a bad headache with vision changes or pain in the upper right area of your belly may mean you have high blood pressure or post birth preeclampsia. Call your provider immediately

## 2024-01-15 NOTE — PROGRESS NOTES
Discussed discharge education and follow up information with patient. Patient discharged in stable condition.

## 2024-01-17 LAB
BACTERIA BLD CULT: NORMAL
SIGNIFICANT IND 70042: NORMAL
SITE SITE: NORMAL
SOURCE SOURCE: NORMAL

## 2024-01-23 ASSESSMENT — ENCOUNTER SYMPTOMS: FEVER: 1

## 2024-01-26 NOTE — PROGRESS NOTES
[facility]  Postpartum Clinic Note    ID: Mary Jane Prater is a 28 y.o. now  who presents for postpartum exam.     Subjective     CC:  postpartum care    HPI:   Pt feels well today.   She was prescribed OCP at initial PP appt, has not yet started them. Plans to begin this weekend.    I have reviewed the prenatal and intrapartum course.   Date of delivery:   Type of delivery: 2023   Delivery laceration(s): no    Postpartum course: complicated  Pt was admitted for post-operative complications with abdominal abscess with medical management, admitted . Given Augmentin BID x10 day. Abdomen is soft today, incision is well healed. No fevers.    Infant is feeding via pumped breast milk, reports this is going well.    Pt reports overall feeling well.   Patient is meeting postpartum milestones.   She feels well healed.   Laceration and/or Incision is well healed.   Lochia has stopped. Bleeding lasted about 4-6 weeks.  She is getting along well with her partner.  She reports her mood is good. Denies postpartum blues.  She has not had sex.   Denies urinary or stool incontinence.      Objective:     Physical Exam:  Wt 178 lb   BMI 35.95 kg/m²     Gen: Alert and oriented, No apparent distress. Appears well, vital signs normal.  Neck: Neck is supple  Lungs: Normal effort on room air, clear to auscultation bilaterally  CV: Regular rate and rhythm. No murmurs, rubs, or gallops.  Breast: exam deferred.  Ext: No clubbing, cyanosis, edema.  Abdomen: soft, non-tender. Incision is clean, dry, intact  Pelvic Exam: deferred      No problems updated.    Current Outpatient Medications Ordered in Epic   Medication Sig Dispense Refill    norethindrone (MICRONOR) 0.35 MG tablet Take 1 Tablet by mouth every day. 28 Tablet 11    Misc. Devices (BREAST PUMP) Misc As needed for breastfeeding or milk storage 1 Each 0    Prenatal MV-Min-Fe Fum-FA-DHA (PRENATAL 1 PO) Take 1 Tablet by mouth every  day.       No current Epic-ordered facility-administered medications on file.       Assessment & Plan:     Mary Jane Prater is 28 y.o. here for routine postpartum care and doing well.    1. Postpartum care and examination    2. S/P     3. Intra-abdominal abscess (HCC)    4. History of postpartum gestational hypertension    5. Encounter for supervision of other normal pregnancy, third trimester    #postpartum  - Doing well postpartum, meeting all postpartum milestones  - continue prenatal vitamins for six months or as long as breastfeeding    #post-operative complications, resolved  - Pt was admitted for post-operative complications with abdominal abscess with medical management, admitted .   - completed antibiotics, Given Augmentin BID x10 day    #gestational hypertension, resolved  - was discharged postpartum with labetalol 200mg daily, but patient stopped the medication in mid-  - BP today is 125/85    #contraception: micronor OCP  - I have reviewed the patient's PMH for contraceptive risk factors, and she has no contraindications to contraception as planned.   - will start this weekend  - considering IUD, OK to make an appointment any time for insertion    #postpartum depression screening  - EPDS score: 3    #well woman care  - Last pap date: none documented     #DVT ppx - none    #Rubella immune    #immunizations  - Tdap 2023  - flu 10/16/2023  - HPV only one dose in [employer], recommend completing vaccine series      Return to clinic as needed for well woman care.    Opal Jaimes MD, MPH  [facility] Family [facility] / Obstetrics  [facility]

## 2024-02-01 ENCOUNTER — POST PARTUM (OUTPATIENT)
Dept: OBGYN | Facility: CLINIC | Age: 29
End: 2024-02-01
Payer: COMMERCIAL

## 2024-02-01 VITALS — WEIGHT: 178 LBS | BODY MASS INDEX: 35.95 KG/M2

## 2024-02-01 DIAGNOSIS — Z34.83 ENCOUNTER FOR SUPERVISION OF OTHER NORMAL PREGNANCY, THIRD TRIMESTER: ICD-10-CM

## 2024-02-01 DIAGNOSIS — Z86.79 HISTORY OF POSTPARTUM GESTATIONAL HYPERTENSION: ICD-10-CM

## 2024-02-01 DIAGNOSIS — K65.1 INTRA-ABDOMINAL ABSCESS (HCC): ICD-10-CM

## 2024-02-01 DIAGNOSIS — Z87.59 HISTORY OF POSTPARTUM GESTATIONAL HYPERTENSION: ICD-10-CM

## 2024-02-01 DIAGNOSIS — Z98.891 S/P C-SECTION: ICD-10-CM

## 2024-02-01 PROCEDURE — 0503F POSTPARTUM CARE VISIT: CPT | Performed by: FAMILY MEDICINE

## 2024-02-01 ASSESSMENT — EDINBURGH POSTNATAL DEPRESSION SCALE (EPDS)
THINGS HAVE BEEN GETTING ON TOP OF ME: NO, I HAVE BEEN COPING AS WELL AS EVER
I HAVE FELT SAD OR MISERABLE: NO, NOT AT ALL
THE THOUGHT OF HARMING MYSELF HAS OCCURRED TO ME: NEVER
I HAVE BEEN ANXIOUS OR WORRIED FOR NO GOOD REASON: NO, NOT AT ALL
I HAVE BEEN SO UNHAPPY THAT I HAVE BEEN CRYING: NO, NEVER
I HAVE LOOKED FORWARD WITH ENJOYMENT TO THINGS: AS MUCH AS I EVER DID
TOTAL SCORE: 3
I HAVE BEEN ABLE TO LAUGH AND SEE THE FUNNY SIDE OF THINGS: AS MUCH AS I ALWAYS COULD
I HAVE BLAMED MYSELF UNNECESSARILY WHEN THINGS WENT WRONG: YES, MOST OF THE TIME
I HAVE BEEN SO UNHAPPY THAT I HAVE HAD DIFFICULTY SLEEPING: NOT AT ALL
I HAVE FELT SCARED OR PANICKY FOR NO GOOD REASON: NO, NOT AT ALL

## 2024-02-01 ASSESSMENT — FIBROSIS 4 INDEX: FIB4 SCORE: 0.31

## 2024-02-01 NOTE — PROGRESS NOTES
Pt is here today for postpartum visit.   Delivery type :   Complications with delivery : none  Currently breast/formula feeding: pumping  LMP : none yet  BCM : pills  Last pap : unknown  EPDS : 3  Pt states she is breaking out in sweats and is concerned

## 2024-02-12 ENCOUNTER — HOSPITAL ENCOUNTER (OUTPATIENT)
Dept: LAB | Facility: MEDICAL CENTER | Age: 29
End: 2024-02-12
Attending: INTERNAL MEDICINE
Payer: COMMERCIAL

## 2024-02-12 ENCOUNTER — OFFICE VISIT (OUTPATIENT)
Dept: MEDICAL GROUP | Facility: PHYSICIAN GROUP | Age: 29
End: 2024-02-12
Payer: COMMERCIAL

## 2024-02-12 VITALS
TEMPERATURE: 97.9 F | BODY MASS INDEX: 35.08 KG/M2 | WEIGHT: 174 LBS | OXYGEN SATURATION: 98 % | HEART RATE: 58 BPM | DIASTOLIC BLOOD PRESSURE: 68 MMHG | RESPIRATION RATE: 16 BRPM | HEIGHT: 59 IN | SYSTOLIC BLOOD PRESSURE: 116 MMHG

## 2024-02-12 DIAGNOSIS — R73.03 PREDIABETES: ICD-10-CM

## 2024-02-12 DIAGNOSIS — T81.43XA POSTOPERATIVE INTRA-ABDOMINAL ABSCESS: ICD-10-CM

## 2024-02-12 DIAGNOSIS — B00.9 HERPES: ICD-10-CM

## 2024-02-12 DIAGNOSIS — E87.6 HYPOKALEMIA: ICD-10-CM

## 2024-02-12 DIAGNOSIS — O16.3 HYPERTENSION AFFECTING PREGNANCY, THIRD TRIMESTER: ICD-10-CM

## 2024-02-12 PROBLEM — K65.1 INTRA-ABDOMINAL ABSCESS (HCC): Status: RESOLVED | Noted: 2024-01-12 | Resolved: 2024-02-12

## 2024-02-12 PROBLEM — G89.18 POST-OPERATIVE PAIN: Status: RESOLVED | Noted: 2023-12-27 | Resolved: 2024-02-12

## 2024-02-12 PROCEDURE — 81025 URINE PREGNANCY TEST: CPT

## 2024-02-12 PROCEDURE — 99214 OFFICE O/P EST MOD 30 MIN: CPT | Performed by: INTERNAL MEDICINE

## 2024-02-12 PROCEDURE — 3074F SYST BP LT 130 MM HG: CPT | Performed by: INTERNAL MEDICINE

## 2024-02-12 PROCEDURE — 3078F DIAST BP <80 MM HG: CPT | Performed by: INTERNAL MEDICINE

## 2024-02-12 ASSESSMENT — FIBROSIS 4 INDEX: FIB4 SCORE: 0.31

## 2024-02-12 NOTE — ASSESSMENT & PLAN NOTE
New condition noted with chart review.  Patient asymptomatic.  Recommend to repeat lab test for follow-up.

## 2024-02-12 NOTE — ASSESSMENT & PLAN NOTE
This is a chronic condition.  The patient is currently on diet therapy.  Lab test result discussed with the patient.

## 2024-02-12 NOTE — ASSESSMENT & PLAN NOTE
This is a new condition.  Patient was seen by OB/GYN doctor and was found to be postoperative complication from the .  The patient was treated with antibiotic.  Patient denies fever or chills.  No abdominal pain noted at this time.  I recommend to repeat CT scan of the abdomen pelvis for follow-up.

## 2024-02-12 NOTE — ASSESSMENT & PLAN NOTE
This was noted during pregnancy.  After delivery patient reported that her blood pressure came back in the normal range.  BP today 112 is 68.  The patient denies chest pain shortness of breath palpitation or near syncope.

## 2024-02-12 NOTE — PROGRESS NOTES
PRIMARY CARE CLINIC VISIT        Chief Complaint   Patient presents with    Abscess      Follow-up intra-abdominal abscess  Hypertension  Obese  Hypokalemia  Prediabetes        History of Present Illness     Postoperative intra-abdominal abscess  This is a new condition.  Patient was seen by OB/GYN doctor and was found to be postoperative complication from the .  The patient was treated with antibiotic.  Patient denies fever or chills.  No abdominal pain noted at this time.  I recommend to repeat CT scan of the abdomen pelvis for follow-up.    Herpes  Chronic intermittent condition.  Patient currently asymptomatic.    Hypertension affecting pregnancy, third trimester  This was noted during pregnancy.  After delivery patient reported that her blood pressure came back in the normal range.  BP today 112 is 68.  The patient denies chest pain shortness of breath palpitation or near syncope.    Hypokalemia  New condition noted with chart review.  Patient asymptomatic.  Recommend to repeat lab test for follow-up.    Prediabetes  This is a chronic condition.  The patient is currently on diet therapy.  Lab test result discussed with the patient.    Current Outpatient Medications on File Prior to Visit   Medication Sig Dispense Refill    Prenatal MV-Min-Fe Fum-FA-DHA (PRENATAL 1 PO) Take 1 Tablet by mouth every day.      norethindrone (MICRONOR) 0.35 MG tablet Take 1 Tablet by mouth every day. (Patient not taking: Reported on 2024) 28 Tablet 11    Misc. Devices (BREAST PUMP) Misc As needed for breastfeeding or milk storage (Patient not taking: Reported on 2024) 1 Each 0     No current facility-administered medications on file prior to visit.        Allergies: Patient has no known allergies.    Current Outpatient Medications Ordered in Epic   Medication Sig Dispense Refill    Prenatal MV-Min-Fe Fum-FA-DHA (PRENATAL 1 PO) Take 1 Tablet by mouth every day.      norethindrone (MICRONOR) 0.35 MG tablet Take 1  "Tablet by mouth every day. (Patient not taking: Reported on 2024) 28 Tablet 11    Misc. Devices (BREAST PUMP) Misc As needed for breastfeeding or milk storage (Patient not taking: Reported on 2024) 1 Each 0     No current [facility]-ordered facility-administered medications on file.       History reviewed. No pertinent past medical history.    Past Surgical History:   Procedure Laterality Date    PRIMARY C SECTION N/A 2023    Procedure:  SECTION, PRIMARY;  Surgeon: Xiang Choi M.D.;  Location: SURGERY LABOR AND DELIVERY;  Service: Obstetrics    WILBUR BY LAPAROSCOPY  2018    Procedure: WILBUR BY LAPAROSCOPY;  Surgeon: Kwesi Clay M.D.;  [facility] SURGERY [facility];  Service: General       Family History   Problem Relation Age of Onset    No Known Problems Mother     Heart Disease Father     Diabetes Father        Social History     Tobacco Use   Smoking Status Never   Smokeless Tobacco Never       Social History     Substance and Sexual Activity   Alcohol Use Not Currently       Review of systems.  As per HPI above. All other systems reviewed and negative.      Past Medical, Social, and Family history reviewed and updated in EPIC     Objective     /68 (BP Location: Right arm, Patient Position: Sitting, BP Cuff Size: Large adult)   Pulse (!) 58   Temp 36.6 °C (97.9 °F) (Temporal)   Resp 16   Ht 1.499 m (4' 11\")   Wt 78.9 kg (174 lb)   SpO2 98%    Body mass index is 35.14 kg/m².    General: alert in no apparent distress.  Cardiovascular: regular rate and rhythm  Pulmonary: lungs : no wheezing   Gastrointestinal:   Bowel sound positive soft nontender no rebound guarding rigidity noted.      Lab Results   Component Value Date/Time    HBA1C 5.5 2022 12:09 PM       Lab Results   Component Value Date/Time    WBC 10.9 (H) 2024 05:39 AM    HEMOGLOBIN 12.9 2024 05:39 AM    HEMATOCRIT 39.4 2024 05:39 AM    MCV 92.7 2024 05:39 AM    PLATELETCT " 425 01/14/2024 05:39 AM         Lab Results   Component Value Date/Time    SODIUM 136 01/11/2024 11:45 PM    POTASSIUM 3.4 (L) 01/11/2024 11:45 PM    GLUCOSE 112 (H) 01/11/2024 11:45 PM    BUN 15 01/11/2024 11:45 PM    CREATININE 0.66 01/11/2024 11:45 PM       Lab Results   Component Value Date/Time    CHOLSTRLTOT 170 09/24/2022 12:09 PM    TRIGLYCERIDE 76 09/24/2022 12:09 PM    HDL 40 09/24/2022 12:09 PM     (H) 09/24/2022 12:09 PM       Lab Results   Component Value Date/Time    ALTSGPT 26 01/11/2024 11:45 PM             Assessment and Plan     1. Postoperative intra-abdominal abscess  This is a new condition.  Clinically patient medically stable.  Patient is afebrile.  No pain noted.  I have recommended CT of the abdomen and pelvis to be done for follow-up.  Recommend to obtain pregnancy test before doing CT scan.  - CT-ABDOMEN-PELVIS WITH; Future  - HCG,QUAL (OUTPATIENT ONLY); Future    2. Hypertension affecting pregnancy, third trimester  Chronic condition.  Patient clinically stable.  BP today 1/12/1968.  Continue to monitor    3. Herpes  Chronic recurrent condition.  The patient currently asymptomatic.  Continue to monitor.    4. Hypokalemia  This is a new condition.  Noted with chart review.  Patient asymptomatic.  Repeat lab test to check potassium level    5. Prediabetes  - Basic Metabolic Panel; Future  - HEMOGLOBIN A1C; Future  Chronic condition.  Recommend diet and exercise.  Encouraged the patient to repeat the lab test.  Continue to monitor.          Attestation: I spent:   33  min -  That includes time for chart review before the visit, the actual patient visit, and time spent on documentation in EMR after the visit.  Chart review/prep, review of other providers' records, imaging/lab review, face-to-face time for history/examination, pt's counseling/education, ordering, prescribing,  review of results/meds/ treatment plan with patient, and care coordination.           Healthcare Maintenance        Health Maintenance Due   Topic Date Due    Cervical Cancer Screening  Never done    COVID-19 Vaccine (3 - 2023-24 season) 09/01/2023               Please note that this dictation was created using voice recognition software. I have made every reasonable attempt to correct obvious errors, but I expect that there are errors of grammar and possibly content that I did not discover before finalizing the note.    Guy Almaraz MD  Internal Medicine  [facility] care [facility]

## 2024-02-13 LAB — HCG UR QL: NEGATIVE

## 2024-02-14 ENCOUNTER — OFFICE VISIT (OUTPATIENT)
Dept: MEDICAL GROUP | Facility: PHYSICIAN GROUP | Age: 29
End: 2024-02-14
Payer: COMMERCIAL

## 2024-02-14 VITALS
BODY MASS INDEX: 35.08 KG/M2 | DIASTOLIC BLOOD PRESSURE: 70 MMHG | HEART RATE: 62 BPM | RESPIRATION RATE: 16 BRPM | WEIGHT: 174 LBS | HEIGHT: 59 IN | TEMPERATURE: 98.5 F | SYSTOLIC BLOOD PRESSURE: 108 MMHG | OXYGEN SATURATION: 95 %

## 2024-02-14 DIAGNOSIS — O16.3 HYPERTENSION AFFECTING PREGNANCY, THIRD TRIMESTER: ICD-10-CM

## 2024-02-14 DIAGNOSIS — B00.9 HERPES: ICD-10-CM

## 2024-02-14 DIAGNOSIS — E87.6 HYPOKALEMIA: ICD-10-CM

## 2024-02-14 DIAGNOSIS — R73.03 PREDIABETES: ICD-10-CM

## 2024-02-14 DIAGNOSIS — T81.43XA POSTOPERATIVE INTRA-ABDOMINAL ABSCESS: ICD-10-CM

## 2024-02-14 DIAGNOSIS — L08.9 FINGER INFECTION: ICD-10-CM

## 2024-02-14 PROBLEM — N94.10 DYSPAREUNIA, FEMALE: Status: RESOLVED | Noted: 2022-11-15 | Resolved: 2024-02-14

## 2024-02-14 PROCEDURE — 3074F SYST BP LT 130 MM HG: CPT | Performed by: INTERNAL MEDICINE

## 2024-02-14 PROCEDURE — 99214 OFFICE O/P EST MOD 30 MIN: CPT | Performed by: INTERNAL MEDICINE

## 2024-02-14 PROCEDURE — 3078F DIAST BP <80 MM HG: CPT | Performed by: INTERNAL MEDICINE

## 2024-02-14 RX ORDER — CEPHALEXIN 500 MG/1
500 CAPSULE ORAL 3 TIMES DAILY
Qty: 30 CAPSULE | Refills: 0 | Status: SHIPPED | OUTPATIENT
Start: 2024-02-14

## 2024-02-14 ASSESSMENT — FIBROSIS 4 INDEX: FIB4 SCORE: 0.31

## 2024-02-14 NOTE — ASSESSMENT & PLAN NOTE
Chronic intermittent condition.  The patient currently asymptomatic.  Patient currently not taking medication.

## 2024-02-14 NOTE — PROGRESS NOTES
PRIMARY CARE CLINIC VISIT        Chief Complaint   Patient presents with    Finger Pain     Index/right      Finger pain  Obese  Hypertension  Hypokalemia  Postoperative intra-abdominal abscess  Prediabetes        History of Present Illness     Index finger pain  This is a new condition.  Patient reported swelling and pain index finger since the last couple days.  Patient denies fever or chills.  Patient denies recent trauma or injury.    Herpes  Chronic intermittent condition.  The patient currently asymptomatic.  Patient currently not taking medication.    Hypertension affecting pregnancy, third trimester  Patient reported that her blood pressure was elevated during pregnancy.  Patient currently asymptomatic she is currently not on therapy.  Patient denies chest pain shortness of breath palpitation.    Hypokalemia  Chronic condition.  This was noted with chart review.  Lab test result discussed with the patient.  Advised the patient to get blood test done for follow-up    Postoperative intra-abdominal abscess  This is a new condition.  Patient was seen by OB/GYN doctor recently.  The patient was noted with intra-abdominal abscess after .  Patient has completed antibiotic treatment.  She is scheduled to undergo CT scan of the abdomen and pelvis this Friday.  Patient denies fever chills abdominal pain.    Prediabetes  Chronic condition.  The patient currently on diet therapy.  Lab tests ordered for follow-up.    Current Outpatient Medications on File Prior to Visit   Medication Sig Dispense Refill    Prenatal MV-Min-Fe Fum-FA-DHA (PRENATAL 1 PO) Take 1 Tablet by mouth every day.      norethindrone (MICRONOR) 0.35 MG tablet Take 1 Tablet by mouth every day. (Patient not taking: Reported on 2024) 28 Tablet 11     No current facility-administered medications on file prior to visit.        Allergies: Patient has no known allergies.    Current Outpatient Medications Ordered in Epic   Medication Sig Dispense  "Refill    cephALEXin (KEFLEX) 500 MG Cap Take 1 Capsule by mouth 3 times a day. 30 Capsule 0    Prenatal MV-Min-Fe Fum-FA-DHA (PRENATAL 1 PO) Take 1 Tablet by mouth every day.      norethindrone (MICRONOR) 0.35 MG tablet Take 1 Tablet by mouth every day. (Patient not taking: Reported on 2024) 28 Tablet 11     No current [facility]-ordered facility-administered medications on file.       History reviewed. No pertinent past medical history.    Past Surgical History:   Procedure Laterality Date    PRIMARY C SECTION N/A 2023    Procedure:  SECTION, PRIMARY;  Surgeon: Xiang Choi M.D.;  Location: SURGERY LAB[facility] DELIV[facility];  Service: Obstetrics    WILBUR BY LAPAROSCOPY  2018    Procedure: WILBUR BY LAPAROSCOPY;  Surgeon: Kwesi Clay M.D.;  Location: SURGERY [facility];  Service: General       Family History   Problem Relation Age of Onset    No Known Problems Mother     Heart Disease Father     Diabetes Father        Social History     Tobacco Use   Smoking Status Never   Smokeless Tobacco Never       Social History     Substance and Sexual Activity   Alcohol Use Not Currently       Review of systems.  As per HPI above. All other systems reviewed and negative.      Past Medical, Social, and Family history reviewed and updated in EPIC     Objective     /70 (BP Location: Right arm, Patient Position: Sitting, BP Cuff Size: Large adult)   Pulse 62   Temp 36.9 °C (98.5 °F) (Temporal)   Resp 16   Ht 1.499 m (4' 11\")   Wt 78.9 kg (174 lb)   SpO2 95%    Body mass index is 35.14 kg/m².    General: alert in no apparent distress.  Cardiovascular: regular rate and rhythm  Pulmonary: lungs : no wheezing   Gastrointestinal: BS present.   Index finger with mild soft tissue swelling/erythema.  No discharge.      Lab Results   Component Value Date/Time    HBA1C 5.5 2022 12:09 PM       Lab Results   Component Value Date/Time    SODIUM 136 2024 11:45 PM    POTASSIUM 3.4 (L) " 01/11/2024 11:45 PM    GLUCOSE 112 (H) 01/11/2024 11:45 PM    BUN 15 01/11/2024 11:45 PM    CREATININE 0.66 01/11/2024 11:45 PM       Lab Results   Component Value Date/Time    CHOLSTRLTOT 170 09/24/2022 12:09 PM    TRIGLYCERIDE 76 09/24/2022 12:09 PM    HDL 40 09/24/2022 12:09 PM     (H) 09/24/2022 12:09 PM       Lab Results   Component Value Date/Time    ALTSGPT 26 01/11/2024 11:45 PM             Assessment and Plan     1. Finger infection  Is a new problem.  Recommend patient to start antibiotic Keflex 500 mg 3 times daily.  Potential side effect of medication discussed with the patient.  Recommend office follow-up approximately 7 days.  Inform the patient that condition got worse such as increasing pain swelling redness discharge the patient should go to urgent care or ER to be evaluated.  - cephALEXin (KEFLEX) 500 MG Cap; Take 1 Capsule by mouth 3 times a day.  Dispense: 30 Capsule; Refill: 0    2. Prediabetes  Chronic condition.  Current status unclear.  Lab test ordered for follow-up.    3. Hypertension affecting pregnancy, third trimester  Chronic condition noted during pregnancy.  Recommend patient to continue to monitor her blood pressure.  Sodium restriction is advised.  Patient will try lose weight.    4. Hypokalemia  Chronic condition.  Patient asymptomatic.  Recommend patient to repeat lab test.    5. Postoperative intra-abdominal abscess  No condition.  Patient has completed antibiotic treatment given by her OB/GYN doctor.  Advised the patient to keep appointment for CT of abdomen pelvis.  Follow-up with me after imaging is done.    6. Herpes  Recurrent condition.  The patient currently asymptomatic.  Continue to monitor      Attestation: I spent:   31    min -  That includes time for chart review before the visit, the actual patient visit, and time spent on documentation in EMR after the visit.  Chart review/prep, review of other providers' records, imaging/lab review, face-to-face time for  history/examination, pt's counseling/education, ordering, prescribing,  review of results/meds/ treatment plan with patient, and care coordination.               Please note that this dictation was created using voice recognition software. I have made every reasonable attempt to correct obvious errors, but I expect that there are errors of grammar and possibly content that I did not discover before finalizing the note.    Guy Almaraz MD  Internal Medicine  [facility]

## 2024-02-14 NOTE — ASSESSMENT & PLAN NOTE
Patient reported that her blood pressure was elevated during pregnancy.  Patient currently asymptomatic she is currently not on therapy.  Patient denies chest pain shortness of breath palpitation.

## 2024-02-14 NOTE — ASSESSMENT & PLAN NOTE
New condition.  This was noted with chart review.  Lab test result discussed with the patient.  Advised the patient to get blood test done for follow-up

## 2024-02-14 NOTE — ASSESSMENT & PLAN NOTE
This is a new condition.  Patient reported swelling and pain index finger since the last couple days.  Patient denies fever or chills.  Patient denies recent trauma or injury.

## 2024-02-14 NOTE — ASSESSMENT & PLAN NOTE
This is a new condition.  Patient was seen by OB/GYN doctor recently.  The patient was noted with intra-abdominal abscess after .  Patient has completed antibiotic treatment.  She is scheduled to undergo CT scan of the abdomen and pelvis this Friday.  Patient denies fever chills abdominal pain.

## 2024-02-16 ENCOUNTER — HOSPITAL ENCOUNTER (OUTPATIENT)
Dept: RADIOLOGY | Facility: MEDICAL CENTER | Age: 29
End: 2024-02-16
Attending: INTERNAL MEDICINE
Payer: COMMERCIAL

## 2024-02-16 ENCOUNTER — HOSPITAL ENCOUNTER (OUTPATIENT)
Dept: LAB | Facility: MEDICAL CENTER | Age: 29
End: 2024-02-16
Attending: INTERNAL MEDICINE
Payer: COMMERCIAL

## 2024-02-16 DIAGNOSIS — T81.43XA POSTOPERATIVE INTRA-ABDOMINAL ABSCESS: ICD-10-CM

## 2024-02-16 DIAGNOSIS — R73.03 PREDIABETES: ICD-10-CM

## 2024-02-16 LAB
ANION GAP SERPL CALC-SCNC: 16 MMOL/L (ref 7–16)
BUN SERPL-MCNC: 16 MG/DL (ref 8–22)
CALCIUM SERPL-MCNC: 9.2 MG/DL (ref 8.5–10.5)
CHLORIDE SERPL-SCNC: 103 MMOL/L (ref 96–112)
CO2 SERPL-SCNC: 20 MMOL/L (ref 20–33)
CREAT SERPL-MCNC: 0.82 MG/DL (ref 0.5–1.4)
EST. AVERAGE GLUCOSE BLD GHB EST-MCNC: 114 MG/DL
FASTING STATUS PATIENT QL REPORTED: NORMAL
GFR SERPLBLD CREATININE-BSD FMLA CKD-EPI: 99 ML/MIN/1.73 M 2
GLUCOSE SERPL-MCNC: 95 MG/DL (ref 65–99)
HBA1C MFR BLD: 5.6 % (ref 4–5.6)
POTASSIUM SERPL-SCNC: 3.8 MMOL/L (ref 3.6–5.5)
SODIUM SERPL-SCNC: 139 MMOL/L (ref 135–145)

## 2024-02-16 PROCEDURE — 83036 HEMOGLOBIN GLYCOSYLATED A1C: CPT

## 2024-02-16 PROCEDURE — 80048 BASIC METABOLIC PNL TOTAL CA: CPT

## 2024-02-16 PROCEDURE — 700117 HCHG RX CONTRAST REV CODE 255: Performed by: INTERNAL MEDICINE

## 2024-02-16 PROCEDURE — 36415 COLL VENOUS BLD VENIPUNCTURE: CPT

## 2024-02-16 PROCEDURE — 74177 CT ABD & PELVIS W/CONTRAST: CPT

## 2024-02-16 RX ADMIN — IOHEXOL 100 ML: 350 INJECTION, SOLUTION INTRAVENOUS at 17:00

## 2024-06-11 ENCOUNTER — OFFICE VISIT (OUTPATIENT)
Dept: URGENT CARE | Facility: CLINIC | Age: 29
End: 2024-06-11
Payer: COMMERCIAL

## 2024-06-11 VITALS
BODY MASS INDEX: 37.9 KG/M2 | HEART RATE: 73 BPM | TEMPERATURE: 97.1 F | OXYGEN SATURATION: 99 % | WEIGHT: 188 LBS | HEIGHT: 59 IN | DIASTOLIC BLOOD PRESSURE: 72 MMHG | SYSTOLIC BLOOD PRESSURE: 116 MMHG

## 2024-06-11 DIAGNOSIS — N30.01 ACUTE CYSTITIS WITH HEMATURIA: Primary | ICD-10-CM

## 2024-06-11 LAB
APPEARANCE UR: CLEAR
BILIRUB UR STRIP-MCNC: NEGATIVE MG/DL
COLOR UR AUTO: YELLOW
GLUCOSE UR STRIP.AUTO-MCNC: NEGATIVE MG/DL
KETONES UR STRIP.AUTO-MCNC: NEGATIVE MG/DL
LEUKOCYTE ESTERASE UR QL STRIP.AUTO: NORMAL
NITRITE UR QL STRIP.AUTO: NEGATIVE
PH UR STRIP.AUTO: 6.5 [PH] (ref 5–8)
POCT INT CON NEG: NEGATIVE
POCT INT CON POS: POSITIVE
POCT URINE PREGNANCY TEST: NEGATIVE
PROT UR QL STRIP: NEGATIVE MG/DL
RBC UR QL AUTO: NORMAL
SP GR UR STRIP.AUTO: 1.01
UROBILINOGEN UR STRIP-MCNC: 0.2 MG/DL

## 2024-06-11 PROCEDURE — 81025 URINE PREGNANCY TEST: CPT | Performed by: PHYSICIAN ASSISTANT

## 2024-06-11 PROCEDURE — 99213 OFFICE O/P EST LOW 20 MIN: CPT | Performed by: PHYSICIAN ASSISTANT

## 2024-06-11 PROCEDURE — 3074F SYST BP LT 130 MM HG: CPT | Performed by: PHYSICIAN ASSISTANT

## 2024-06-11 PROCEDURE — 81002 URINALYSIS NONAUTO W/O SCOPE: CPT | Performed by: PHYSICIAN ASSISTANT

## 2024-06-11 PROCEDURE — 3078F DIAST BP <80 MM HG: CPT | Performed by: PHYSICIAN ASSISTANT

## 2024-06-11 RX ORDER — NITROFURANTOIN 25; 75 MG/1; MG/1
100 CAPSULE ORAL 2 TIMES DAILY
Qty: 10 CAPSULE | Refills: 0 | Status: SHIPPED | OUTPATIENT
Start: 2024-06-11 | End: 2024-06-16

## 2024-06-11 ASSESSMENT — FIBROSIS 4 INDEX: FIB4 SCORE: 0.31

## 2024-06-12 NOTE — PROGRESS NOTES
"Subjective:   Mary Jane Prater is a 28 y.o. female who presents for Urinary Frequency (Patient states that she has been having frequency for 2 days and her urine has been dark. )      HPI  The patient presents to the Urgent Care with complaints of possible UTI.  History of UTI and this feels similar.  Onset 2 days.  Reports of urinary urgency and urinary frequency.  No painful urination but a tingling sensation.  No other symptoms.  Denies any fever, chills, abdominal pain, dysuria, nausea, vomiting, flank pain.       No past medical history on file.  No Known Allergies     Objective:     /72   Pulse 73   Temp 36.2 °C (97.1 °F) (Temporal)   Ht 1.499 m (4' 11\")   Wt 85.3 kg (188 lb)   SpO2 99%   BMI 37.97 kg/m²     Physical Exam  Vitals reviewed.   Constitutional:       General: She is not in acute distress.     Appearance: Normal appearance. She is not ill-appearing or toxic-appearing.   Eyes:      Conjunctiva/sclera: Conjunctivae normal.   Cardiovascular:      Rate and Rhythm: Normal rate.   Pulmonary:      Effort: Pulmonary effort is normal.   Abdominal:      General: Abdomen is flat. There is no distension.      Palpations: Abdomen is soft.      Tenderness: There is no abdominal tenderness. There is no right CVA tenderness, left CVA tenderness, guarding or rebound.   Musculoskeletal:      Cervical back: Neck supple.   Skin:     General: Skin is warm and dry.   Neurological:      General: No focal deficit present.      Mental Status: She is alert and oriented to person, place, and time.   Psychiatric:         Mood and Affect: Mood normal.         Behavior: Behavior normal.         Diagnosis and associated orders:     1. Acute cystitis with hematuria  - nitrofurantoin (MACROBID) 100 MG Cap; Take 1 Capsule by mouth 2 times a day for 5 days.  Dispense: 10 Capsule; Refill: 0  - POCT Urinalysis  - POCT Pregnancy       Comments/MDM:     The patient's presenting symptoms and exam findings are consistent " with a simple urinary tract infection. They are overall very well-appearing with normal vital signs and benign examination findings.   Macrobid   Increase fluid intake.  Advised to return to the Urgent Care or follow up with their PCP if symptoms are not improving in 2-3 days or sooner if any worsening symptoms such as fever, chills, abdominal pain, back/flank pain, nausea, vomiting, or any other concerns.        I personally reviewed prior external notes and test results pertinent to today's visit. Pathogenesis of diagnosis discussed including typical length and natural progression. Supportive care, natural history, differential diagnoses, and indications for immediate follow-up discussed. Patient expresses understanding and agrees to plan. Patient denies any other questions or concerns.     Follow-up with the primary care physician for recheck, reevaluation, and consideration of further management.    Please note that this dictation was created using voice recognition software. I have made a reasonable attempt to correct obvious errors, but I expect that there are errors of grammar and possibly content that I did not discover before finalizing the note.    This note was electronically signed by Servando Jorge PA-C

## 2024-07-25 ENCOUNTER — OFFICE VISIT (OUTPATIENT)
Dept: MEDICAL GROUP | Facility: PHYSICIAN GROUP | Age: 29
End: 2024-07-25
Payer: COMMERCIAL

## 2024-07-25 VITALS
OXYGEN SATURATION: 95 % | RESPIRATION RATE: 16 BRPM | BODY MASS INDEX: 37.68 KG/M2 | HEART RATE: 76 BPM | SYSTOLIC BLOOD PRESSURE: 124 MMHG | WEIGHT: 186.9 LBS | TEMPERATURE: 97.1 F | HEIGHT: 59 IN | DIASTOLIC BLOOD PRESSURE: 80 MMHG

## 2024-07-25 DIAGNOSIS — N92.6 MENSTRUAL DISORDER: ICD-10-CM

## 2024-07-25 DIAGNOSIS — G62.9 NEUROPATHY: ICD-10-CM

## 2024-07-25 DIAGNOSIS — L60.0 INGROWN NAIL OF GREAT TOE OF LEFT FOOT: ICD-10-CM

## 2024-07-25 DIAGNOSIS — R30.0 DYSURIA: ICD-10-CM

## 2024-07-25 DIAGNOSIS — Z12.4 CERVICAL CANCER SCREENING: ICD-10-CM

## 2024-07-25 DIAGNOSIS — E66.3 OVERWEIGHT: ICD-10-CM

## 2024-07-25 DIAGNOSIS — R73.03 PREDIABETES: ICD-10-CM

## 2024-07-25 PROBLEM — L08.9 FINGER INFECTION: Status: RESOLVED | Noted: 2024-02-14 | Resolved: 2024-07-25

## 2024-07-25 PROBLEM — K65.1 POSTOPERATIVE INTRA-ABDOMINAL ABSCESS (HCC): Status: RESOLVED | Noted: 2024-01-12 | Resolved: 2024-07-25

## 2024-07-25 PROBLEM — T81.43XA POSTOPERATIVE INTRA-ABDOMINAL ABSCESS: Status: RESOLVED | Noted: 2024-01-12 | Resolved: 2024-07-25

## 2024-07-25 PROBLEM — E87.6 HYPOKALEMIA: Status: RESOLVED | Noted: 2024-02-12 | Resolved: 2024-07-25

## 2024-07-25 PROCEDURE — 3079F DIAST BP 80-89 MM HG: CPT | Performed by: INTERNAL MEDICINE

## 2024-07-25 PROCEDURE — 99214 OFFICE O/P EST MOD 30 MIN: CPT | Performed by: INTERNAL MEDICINE

## 2024-07-25 PROCEDURE — 3074F SYST BP LT 130 MM HG: CPT | Performed by: INTERNAL MEDICINE

## 2024-07-25 RX ORDER — CEPHALEXIN 500 MG/1
500 CAPSULE ORAL 3 TIMES DAILY
Qty: 30 CAPSULE | Refills: 0 | Status: SHIPPED | OUTPATIENT
Start: 2024-07-25

## 2024-07-25 ASSESSMENT — FIBROSIS 4 INDEX: FIB4 SCORE: 0.32

## 2024-08-08 ENCOUNTER — APPOINTMENT (OUTPATIENT)
Dept: MEDICAL GROUP | Facility: PHYSICIAN GROUP | Age: 29
End: 2024-08-08
Payer: COMMERCIAL

## 2024-08-14 ENCOUNTER — OFFICE VISIT (OUTPATIENT)
Dept: OBGYN | Facility: CLINIC | Age: 29
End: 2024-08-14
Payer: COMMERCIAL

## 2024-08-14 VITALS — WEIGHT: 185 LBS | SYSTOLIC BLOOD PRESSURE: 135 MMHG | DIASTOLIC BLOOD PRESSURE: 78 MMHG | BODY MASS INDEX: 37.37 KG/M2

## 2024-08-14 DIAGNOSIS — N93.9 ABNORMAL UTERINE BLEEDING (AUB): Primary | ICD-10-CM

## 2024-08-14 LAB
POCT INT CON NEG: NEGATIVE
POCT INT CON POS: POSITIVE
POCT URINE PREGNANCY TEST: NEGATIVE

## 2024-08-14 PROCEDURE — 3075F SYST BP GE 130 - 139MM HG: CPT | Performed by: OBSTETRICS & GYNECOLOGY

## 2024-08-14 PROCEDURE — 81025 URINE PREGNANCY TEST: CPT | Performed by: OBSTETRICS & GYNECOLOGY

## 2024-08-14 PROCEDURE — 99214 OFFICE O/P EST MOD 30 MIN: CPT | Performed by: OBSTETRICS & GYNECOLOGY

## 2024-08-14 PROCEDURE — 3078F DIAST BP <80 MM HG: CPT | Performed by: OBSTETRICS & GYNECOLOGY

## 2024-08-14 RX ORDER — LEVONORGESTREL/ETHIN.ESTRADIOL 0.1-0.02MG
1 TABLET ORAL DAILY
Qty: 28 TABLET | Refills: 11 | Status: SHIPPED | OUTPATIENT
Start: 2024-08-14

## 2024-08-14 ASSESSMENT — FIBROSIS 4 INDEX: FIB4 SCORE: 0.32

## 2024-08-14 NOTE — PROGRESS NOTES
Chief complaint;    Mary Jane Prater is a 29 y.o.  who presents complaining of irregular vaginal bleeding including light to moderate spotting since  patient is status post primary  section performed 2023.  Patient was readmitted for pelvic abscess which was treated with IV antibiotics.  Patient has a history of preeclampsia with severe features.  Patient denies abdominal pelvic pain.  Patient stopped nursing after 6 weeks she is currently bottlefeeding  Patient has not been taking Micronor        Review of systems; denies fever chills abdominal pain, denies chest pain shortness of breath or urinary symptoms  Past medical history-History reviewed. No pertinent past medical history.  Past surgical history-  Past Surgical History:   Procedure Laterality Date    PRIMARY C SECTION N/A 2023    Procedure:  SECTION, PRIMARY;  Surgeon: Xiang Choi M.D.;  Location: SURGERY LABOR AND DELIVERY;  Service: Obstetrics    WILBUR BY LAPAROSCOPY  2018    Procedure: WILBUR BY LAPAROSCOPY;  Surgeon: Kwesi Clay M.D.;  Location: SURGERY [facility];  Service: General     Allergies-Patient has no known allergies.  Medications-  Current Outpatient Medications on File Prior to Visit   Medication Sig Dispense Refill    cephALEXin (KEFLEX) 500 MG Cap Take 1 Capsule by mouth 3 times a day. 30 Capsule 0     No current facility-administered medications on file prior to visit.     Social history-  Social History     Socioeconomic History    Marital status: Single     Spouse name: Not on file    Number of children: Not on file    Years of education: Not on file    Highest education level: Not on file   Occupational History    Not on file   Tobacco Use    Smoking status: Never    Smokeless tobacco: Never   Vaping Use    Vaping status: Never Used   Substance and Sexual Activity    Alcohol use: Not Currently    Drug use: No    Sexual activity: Yes     Partners: Male     Birth  control/protection: None   Other Topics Concern    Not on file   Social History Narrative    Not on file     Social Determinants of Health     Financial Resource Strain: Unknown (6/9/2022)    Overall Financial Resource Strain (CARDIA)     Difficulty of Paying Living Expenses: Patient declined   Food Insecurity: Unknown (6/9/2022)    Hunger Vital Sign     Worried About Running Out of Food in the Last Year: Patient declined     Ran Out of Food in the Last Year: Patient declined   Transportation Needs: Unknown (6/9/2022)    PRAPARE - Transportation     Lack of Transportation (Medical): Patient declined     Lack of Transportation (Non-Medical): Patient declined   Physical Activity: Not on file   Stress: Not on file   Social Connections: Unknown (6/9/2022)    Social Connection and Isolation Panel [NHANES]     Frequency of Communication with Friends and Family: Patient declined     Frequency of Social Gatherings with Friends and Family: Patient declined     Attends Orthodoxy Services: Patient declined     Active Member of Clubs or Organizations: Patient declined     Attends Club or Organization Meetings: Patient declined     Marital Status: Patient declined   Intimate Partner Violence: Not on file   Housing Stability: Unknown (6/9/2022)    Housing Stability Vital Sign     Unable to Pay for Housing in the Last Year: Patient refused     Number of Places Lived in the Last Year: Not on file     Unstable Housing in the Last Year: Patient refused     Past Family History-no history of breast or ovarian cancer    Physical examination;  Alert and oriented x3  General a thin well-developed well-nourished female in no apparent distress  Vitals:    08/14/24 1032   BP: 135/78   BP Location: Right arm   Patient Position: Sitting   BP Cuff Size: Small adult   Weight: 185 lb     Skin is warm and dry  Neck-supple  HEENT-head-atraumatic, normocephalic, EOMI, PERRLA  Cardiovascular-regular rate and rhythm, normal S1-S2, no murmurs or  gallops  Lungs-clear to auscultation bilaterally  Back-negative CVA tenderness  Abdomen-nondistended positive bowel sounds soft nontender no masses or hepatosplenomegaly  Pelvic examination;  External genitalia-no visible lesions   Vagina-no blood or discharge  Cervix-no gross lesions  Uterus-normal size and shape, nontender  Adnexa without mass or tenderness  Extremities without cyanosis clubbing or edema  Neurologic exam grossly intact    Urine hCG-negative    Impression;  Abnormal uterine bleeding    Plan;  Alesse x 3 months with the option to continue      25  Minutes spent with the patient in face-to-face contact, greater than 50% of the time spent on counseling and coordination of care. All questions answered in detail.    Female chaperone present for entire examination and history

## 2024-09-12 ENCOUNTER — HOSPITAL ENCOUNTER (OUTPATIENT)
Dept: LAB | Facility: MEDICAL CENTER | Age: 29
End: 2024-09-12
Attending: INTERNAL MEDICINE
Payer: COMMERCIAL

## 2024-09-12 DIAGNOSIS — R73.03 PREDIABETES: ICD-10-CM

## 2024-09-12 DIAGNOSIS — R30.0 DYSURIA: ICD-10-CM

## 2024-09-12 LAB
ANION GAP SERPL CALC-SCNC: 12 MMOL/L (ref 7–16)
BUN SERPL-MCNC: 16 MG/DL (ref 8–22)
CALCIUM SERPL-MCNC: 8.9 MG/DL (ref 8.5–10.5)
CHLORIDE SERPL-SCNC: 106 MMOL/L (ref 96–112)
CHOLEST SERPL-MCNC: 208 MG/DL (ref 100–199)
CO2 SERPL-SCNC: 21 MMOL/L (ref 20–33)
CREAT SERPL-MCNC: 0.86 MG/DL (ref 0.5–1.4)
EST. AVERAGE GLUCOSE BLD GHB EST-MCNC: 128 MG/DL
FASTING STATUS PATIENT QL REPORTED: NORMAL
GFR SERPLBLD CREATININE-BSD FMLA CKD-EPI: 94 ML/MIN/1.73 M 2
GLUCOSE SERPL-MCNC: 105 MG/DL (ref 65–99)
HBA1C MFR BLD: 6.1 % (ref 4–5.6)
HCG SERPL QL: NEGATIVE
HDLC SERPL-MCNC: 40 MG/DL
LDLC SERPL CALC-MCNC: 141 MG/DL
POTASSIUM SERPL-SCNC: 4.2 MMOL/L (ref 3.6–5.5)
SODIUM SERPL-SCNC: 139 MMOL/L (ref 135–145)
TRIGL SERPL-MCNC: 133 MG/DL (ref 0–149)

## 2024-09-12 PROCEDURE — 83036 HEMOGLOBIN GLYCOSYLATED A1C: CPT

## 2024-09-12 PROCEDURE — 36415 COLL VENOUS BLD VENIPUNCTURE: CPT

## 2024-09-12 PROCEDURE — 80048 BASIC METABOLIC PNL TOTAL CA: CPT

## 2024-09-12 PROCEDURE — 80061 LIPID PANEL: CPT

## 2024-09-12 PROCEDURE — 87086 URINE CULTURE/COLONY COUNT: CPT

## 2024-09-12 PROCEDURE — 84703 CHORIONIC GONADOTROPIN ASSAY: CPT

## 2024-09-14 LAB
BACTERIA UR CULT: NORMAL
SIGNIFICANT IND 70042: NORMAL
SITE SITE: NORMAL
SOURCE SOURCE: NORMAL

## 2025-04-25 ENCOUNTER — OFFICE VISIT (OUTPATIENT)
Dept: URGENT CARE | Facility: PHYSICIAN GROUP | Age: 30
End: 2025-04-25
Payer: COMMERCIAL

## 2025-04-25 VITALS
HEART RATE: 93 BPM | OXYGEN SATURATION: 96 % | SYSTOLIC BLOOD PRESSURE: 118 MMHG | WEIGHT: 180 LBS | RESPIRATION RATE: 12 BRPM | BODY MASS INDEX: 36.29 KG/M2 | TEMPERATURE: 97.6 F | HEIGHT: 59 IN | DIASTOLIC BLOOD PRESSURE: 70 MMHG

## 2025-04-25 DIAGNOSIS — J01.90 ACUTE BACTERIAL SINUSITIS: ICD-10-CM

## 2025-04-25 DIAGNOSIS — B96.89 ACUTE BACTERIAL SINUSITIS: ICD-10-CM

## 2025-04-25 PROCEDURE — 99213 OFFICE O/P EST LOW 20 MIN: CPT | Performed by: NURSE PRACTITIONER

## 2025-04-25 PROCEDURE — 3078F DIAST BP <80 MM HG: CPT | Performed by: NURSE PRACTITIONER

## 2025-04-25 PROCEDURE — 3074F SYST BP LT 130 MM HG: CPT | Performed by: NURSE PRACTITIONER

## 2025-04-25 ASSESSMENT — FIBROSIS 4 INDEX: FIB4 SCORE: 0.32

## 2025-04-26 NOTE — PROGRESS NOTES
Verbal consent was acquired by the patient to use [employer] ambient listening note generation during this visit          Chief Complaint   Patient presents with    Sinus Problem     Sinus issues, congested in morning, x 1 week.           History of Present Illness  The patient is a 29-year-old female who presents for evaluation of a sinus infection.    She reports an improvement in her condition, with the absence of pain. However, hoarseness has been present since yesterday, and green mucus is still produced, especially in the mornings. She describes her voice as resembling that of a smoker, despite not being one herself. Occasional coughing is noted, but it is not severe. No ear pain or fevers are reported. A week ago, pain was experienced predominantly on the left side. Relief has been found through the use of a neti pot and humidifier, which have facilitated the expulsion of mucus. No time off work has been required due to her symptoms.    SOCIAL HISTORY  She does not smoke.         ROS:    No severe shortness of breath   No cardiac like chest pain, as discussed   As otherwise stated in HPI    Medical/SX/ Social History:  Reviewed per chart    Pertinent Medications:    Current Outpatient Medications on File Prior to Visit   Medication Sig Dispense Refill    levonorgestrel-ethinyl estradiol (AVIANE) 0.1-20 MG-MCG per tablet Take 1 Tablet by mouth every day. 28 Tablet 11     No current facility-administered medications on file prior to visit.        Allergies:    Patient has no known allergies.     Problem list, medications, and allergies reviewed by myself today in Epic     Physical Exam:    Vitals:    04/25/25 1757   BP: 118/70   Pulse: 93   Resp: 12   Temp: 36.4 °C (97.6 °F)   SpO2: 96%             Physical Exam  Vitals and nursing note reviewed.   Constitutional:       General: She is not in acute distress.     Appearance: Normal appearance. She is not ill-appearing or toxic-appearing.   HENT:      Head:  Normocephalic and atraumatic.      Right Ear: Tympanic membrane, ear canal and external ear normal.      Left Ear: Tympanic membrane, ear canal and external ear normal.      Nose: Nasal tenderness and congestion present.      Right Turbinates: Enlarged.      Left Turbinates: Enlarged.      Right Sinus: Maxillary sinus tenderness present.      Left Sinus: Maxillary sinus tenderness present.      Mouth/Throat:      Mouth: Mucous membranes are moist.      Pharynx: Oropharynx is clear. Uvula midline. Posterior oropharyngeal erythema and postnasal drip present. No pharyngeal swelling, oropharyngeal exudate or uvula swelling.   Eyes:      Extraocular Movements: Extraocular movements intact.      Conjunctiva/sclera: Conjunctivae normal.      Pupils: Pupils are equal, round, and reactive to light.   Cardiovascular:      Rate and Rhythm: Normal rate.      Pulses: Normal pulses.   Pulmonary:      Effort: Pulmonary effort is normal.   Musculoskeletal:         General: Normal range of motion.      Cervical back: Normal range of motion.   Skin:     General: Skin is warm.      Capillary Refill: Capillary refill takes less than 2 seconds.   Neurological:      General: No focal deficit present.      Mental Status: She is alert and oriented to person, place, and time.            Medical Decision making and plan :  I personally reviewed prior external notes and test results pertinent to today's visit. Pt is clinically stable at today's acute urgent care visit.  Patient appears nontoxic with no acute distress noted. Appropriate for outpatient care at this time.    Pleasant 29 y.o. female presented clinic with:     Assessment & Plan  1. Sinus infection.  - Reports improvement in symptoms, including absence of pain and reduced mucus production.  - Still experiences hoarseness and occasional green mucus, primarily in the mornings.  - Examination of ears and throat showed no significant findings.  - Augmentin 875 mg twice daily for one  week prescribed to ensure complete resolution of the infection. This is contingent if she does not improve.  Prescription sent to [facility] on [address].      Shared decision-making was utilized with patient for treatment plan. Medication discussed included indication for use and the potential benefits and side effects. Education was provided regarding the aforementioned assessments.  Differential Diagnosis, natural history, and supportive care discussed. All of the patient's questions were answered to their satisfaction at the time of discharge. Patient was encouraged to monitor symptoms closely. Those signs and symptoms which would warrant concern and mandate seeking a higher level of service through the emergency department discussed at length.  Patient stated agreement and understanding of this plan of care.    Disposition:  Home in stable condition     Voice Recognition Disclaimer:  Portions of this document were created using voice recognition software and [employer] technology provided by [employer]. The software does have a chance of producing errors of grammar and possibly content. I have made every reasonable attempt to correct obvious errors, but there may be errors of grammar and possibly content that I did not discover before finalizing the  documentation.    LEONILA Cummings.

## 2025-07-23 ENCOUNTER — OFFICE VISIT (OUTPATIENT)
Dept: URGENT CARE | Facility: PHYSICIAN GROUP | Age: 30
End: 2025-07-23
Payer: COMMERCIAL

## 2025-07-23 ENCOUNTER — HOSPITAL ENCOUNTER (OUTPATIENT)
Facility: MEDICAL CENTER | Age: 30
End: 2025-07-23
Payer: COMMERCIAL

## 2025-07-23 VITALS
RESPIRATION RATE: 16 BRPM | HEIGHT: 59 IN | HEART RATE: 70 BPM | WEIGHT: 170 LBS | TEMPERATURE: 97 F | OXYGEN SATURATION: 98 % | BODY MASS INDEX: 34.27 KG/M2 | DIASTOLIC BLOOD PRESSURE: 82 MMHG | SYSTOLIC BLOOD PRESSURE: 128 MMHG

## 2025-07-23 DIAGNOSIS — R39.9 UTI SYMPTOMS: ICD-10-CM

## 2025-07-23 DIAGNOSIS — N30.90 CYSTITIS: ICD-10-CM

## 2025-07-23 DIAGNOSIS — R39.9 UTI SYMPTOMS: Primary | ICD-10-CM

## 2025-07-23 LAB
APPEARANCE UR: NORMAL
BILIRUB UR STRIP-MCNC: NEGATIVE MG/DL
COLOR UR AUTO: NORMAL
GLUCOSE UR STRIP.AUTO-MCNC: NEGATIVE MG/DL
KETONES UR STRIP.AUTO-MCNC: NEGATIVE MG/DL
LEUKOCYTE ESTERASE UR QL STRIP.AUTO: NORMAL
NITRITE UR QL STRIP.AUTO: NEGATIVE
PH UR STRIP.AUTO: 5.5 [PH] (ref 5–8)
POCT INT CON NEG: NEGATIVE
POCT INT CON POS: POSITIVE
POCT URINE PREGNANCY TEST: NEGATIVE
PROT UR QL STRIP: NEGATIVE MG/DL
RBC UR QL AUTO: NEGATIVE
SP GR UR STRIP.AUTO: 1.02
UROBILINOGEN UR STRIP-MCNC: NORMAL MG/DL

## 2025-07-23 PROCEDURE — 87086 URINE CULTURE/COLONY COUNT: CPT

## 2025-07-23 PROCEDURE — 3079F DIAST BP 80-89 MM HG: CPT

## 2025-07-23 PROCEDURE — 81002 URINALYSIS NONAUTO W/O SCOPE: CPT

## 2025-07-23 PROCEDURE — 87510 GARDNER VAG DNA DIR PROBE: CPT

## 2025-07-23 PROCEDURE — 87480 CANDIDA DNA DIR PROBE: CPT

## 2025-07-23 PROCEDURE — 81025 URINE PREGNANCY TEST: CPT

## 2025-07-23 PROCEDURE — 3074F SYST BP LT 130 MM HG: CPT

## 2025-07-23 PROCEDURE — 99214 OFFICE O/P EST MOD 30 MIN: CPT

## 2025-07-23 PROCEDURE — 87660 TRICHOMONAS VAGIN DIR PROBE: CPT

## 2025-07-23 RX ORDER — NITROFURANTOIN 25; 75 MG/1; MG/1
100 CAPSULE ORAL EVERY 12 HOURS
Qty: 10 CAPSULE | Refills: 0 | Status: SHIPPED | OUTPATIENT
Start: 2025-07-23 | End: 2025-07-28

## 2025-07-23 ASSESSMENT — FIBROSIS 4 INDEX: FIB4 SCORE: 0.33

## 2025-07-23 ASSESSMENT — ENCOUNTER SYMPTOMS
FEVER: 0
CHILLS: 0
FLANK PAIN: 0

## 2025-07-23 NOTE — PROGRESS NOTES
CHIEF COMPLAINT  Chief Complaint   Patient presents with    Dysuria     X 6 days. Urgency.     Subjective:   Mary Jane Prater is a 30 y.o. female who presents to urgent care with concerns for mild dysuria and urinary frequency x 6 days.  She denies any pelvic pain or flank pain.  No fevers or chills.  Patient reports that symptoms of dysuria are extremely mild and similar to a tingling sensation.  Denies any abnormal vaginal discharge. She reports taking AZO in attempt to alleviate symptoms, and reports improvement with use.       Review of Systems   Constitutional:  Negative for chills and fever.   Genitourinary:  Positive for dysuria, frequency and urgency. Negative for flank pain and hematuria.       PAST MEDICAL HISTORY  Patient Active Problem List    Diagnosis Date Noted    Menstrual disorder 07/25/2024    Ingrown nail of great toe of left foot 07/25/2024    Neuropathy 07/25/2024    Overweight 07/25/2024    Dysuria 07/25/2024    Cervical cancer screening 07/25/2024    Prediabetes 02/12/2024    Hypertension affecting pregnancy, third trimester 12/22/2023    Herpes 11/23/2022       SURGICAL HISTORY   has a past surgical history that includes angus by laparoscopy (5/24/2018) and primary c section (N/A, 12/24/2023).    ALLERGIES  Allergies[1]    CURRENT MEDICATIONS  Home Medications       Reviewed by Andrews Jansen'racheal (Medical Assistant) on 07/23/25 at 1158  Med List Status: <None>     Medication Last Dose Status   levonorgestrel-ethinyl estradiol (AVIANE) 0.1-20 MG-MCG per tablet Taking Active                    SOCIAL HISTORY  Social History     Tobacco Use    Smoking status: Never    Smokeless tobacco: Never   Vaping Use    Vaping status: Never Used   Substance and Sexual Activity    Alcohol use: Not Currently    Drug use: No    Sexual activity: Yes     Partners: Male     Birth control/protection: None       FAMILY HISTORY  Family History   Problem Relation Age of Onset    No Known Problems  "Mother     Heart Disease Father     Diabetes Father          Medications, Allergies, and current problem list reviewed today in Epic.     Objective:     /82 (BP Location: Left arm, Patient Position: Sitting, BP Cuff Size: Adult)   Pulse 70   Temp 36.1 °C (97 °F) (Temporal)   Resp 16   Ht 1.499 m (4' 11\")   Wt 77.1 kg (170 lb)   SpO2 98%     Physical Exam  Vitals reviewed.   Constitutional:       General: She is not in acute distress.     Appearance: Normal appearance. She is not ill-appearing or toxic-appearing.   Cardiovascular:      Rate and Rhythm: Normal rate.      Pulses: Normal pulses.   Pulmonary:      Effort: Pulmonary effort is normal.   Abdominal:      Tenderness: There is no right CVA tenderness or left CVA tenderness.   Neurological:      General: No focal deficit present.      Mental Status: She is alert.   Psychiatric:         Mood and Affect: Mood normal.         Lab Results/POC Test Results   Results for orders placed or performed in visit on 07/23/25   POCT Urinalysis    Collection Time: 07/23/25 11:54 AM   Result Value Ref Range    POC Color Dark yellow Negative    POC Appearance Slightly Cloudy Negative    POC Glucose Negative Negative mg/dL    POC Bilirubin Negative Negative mg/dL    POC Ketones Negative Negative mg/dL    POC Specific Gravity 1.020 <1.005 - >1.030    POC Blood Negative Negative    POC Urine PH 5.5 5.0 - 8.0    POC Protein Negative Negative mg/dL    POC Urobiligen 0.2 E.U./dL Negative (0.2) mg/dL    POC Nitrites Negative Negative    POC Leukocyte Esterase Small Negative   POCT Pregnancy    Collection Time: 07/23/25 12:04 PM   Result Value Ref Range    POC Urine Pregnancy Test Negative     Internal Control Positive Positive     Internal Control Negative Negative            Assessment/Plan:     Diagnosis and associated orders:     1. UTI symptoms  POCT Urinalysis    POCT Pregnancy    URINE CULTURE(NEW)      2. Cystitis  nitrofurantoin (MACROBID) 100 MG Cap    VAGINAL " PATHOGENS DNA PANEL         Comments/MDM:     POC urine positive for nitrites, otherwise unremarkable.  POC pregnancy negative.  Will culture for further evaluation.  Discussed concern for alternate etiology of BV getting presenting complaints.  Vaginal pathogen swab collected.  Patient started on course of Macrobid for treatment of acute cystitis.  Will follow-up with vaginal pathogen swab tomorrow.  Continue with adequate rest and hydration.  Strict ER return precautions discussed.  Patient comfortable with plan.         Differential diagnosis, natural history, supportive care, and indications for immediate follow-up discussed.    Advised the patient to follow-up with the primary care physician for recheck, reevaluation, and consideration of further management.    Please note that this dictation was created using voice recognition software. I have made a reasonable attempt to correct obvious errors, but I expect that there are errors of grammar and possibly content that I did not discover before finalizing the note.    This note was electronically signed by KAT Germain           [1] No Known Allergies

## 2025-07-24 DIAGNOSIS — B37.31 VAGINAL YEAST INFECTION: Primary | ICD-10-CM

## 2025-07-24 LAB
CANDIDA DNA VAG QL PROBE+SIG AMP: POSITIVE
G VAGINALIS DNA VAG QL PROBE+SIG AMP: NEGATIVE
T VAGINALIS DNA VAG QL PROBE+SIG AMP: NEGATIVE

## 2025-07-24 RX ORDER — FLUCONAZOLE 150 MG/1
TABLET ORAL
Qty: 2 TABLET | Refills: 0 | Status: SHIPPED | OUTPATIENT
Start: 2025-07-24

## 2025-07-26 LAB
BACTERIA UR CULT: NORMAL
SIGNIFICANT IND 70042: NORMAL
SITE SITE: NORMAL
SOURCE SOURCE: NORMAL

## 2025-08-05 SDOH — HEALTH STABILITY: PHYSICAL HEALTH: ON AVERAGE, HOW MANY DAYS PER WEEK DO YOU ENGAGE IN MODERATE TO STRENUOUS EXERCISE (LIKE A BRISK WALK)?: 1 DAY

## 2025-08-05 SDOH — ECONOMIC STABILITY: INCOME INSECURITY: IN THE LAST 12 MONTHS, WAS THERE A TIME WHEN YOU WERE NOT ABLE TO PAY THE MORTGAGE OR RENT ON TIME?: PATIENT DECLINED

## 2025-08-05 SDOH — ECONOMIC STABILITY: HOUSING INSECURITY
IN THE LAST 12 MONTHS, WAS THERE A TIME WHEN YOU DID NOT HAVE A STEADY PLACE TO SLEEP OR SLEPT IN A SHELTER (INCLUDING NOW)?: PATIENT DECLINED

## 2025-08-05 SDOH — HEALTH STABILITY: PHYSICAL HEALTH: ON AVERAGE, HOW MANY MINUTES DO YOU ENGAGE IN EXERCISE AT THIS LEVEL?: 40 MIN

## 2025-08-05 SDOH — ECONOMIC STABILITY: FOOD INSECURITY: WITHIN THE PAST 12 MONTHS, THE FOOD YOU BOUGHT JUST DIDN'T LAST AND YOU DIDN'T HAVE MONEY TO GET MORE.: PATIENT DECLINED

## 2025-08-05 SDOH — HEALTH STABILITY: MENTAL HEALTH
STRESS IS WHEN SOMEONE FEELS TENSE, NERVOUS, ANXIOUS, OR CAN'T SLEEP AT NIGHT BECAUSE THEIR MIND IS TROUBLED. HOW STRESSED ARE YOU?: ONLY A LITTLE

## 2025-08-05 SDOH — ECONOMIC STABILITY: INCOME INSECURITY: HOW HARD IS IT FOR YOU TO PAY FOR THE VERY BASICS LIKE FOOD, HOUSING, MEDICAL CARE, AND HEATING?: PATIENT DECLINED

## 2025-08-05 SDOH — ECONOMIC STABILITY: FOOD INSECURITY: WITHIN THE PAST 12 MONTHS, YOU WORRIED THAT YOUR FOOD WOULD RUN OUT BEFORE YOU GOT MONEY TO BUY MORE.: PATIENT DECLINED

## 2025-08-05 ASSESSMENT — SOCIAL DETERMINANTS OF HEALTH (SDOH)
WITHIN THE PAST 12 MONTHS, YOU WORRIED THAT YOUR FOOD WOULD RUN OUT BEFORE YOU GOT THE MONEY TO BUY MORE: PATIENT DECLINED
IN A TYPICAL WEEK, HOW MANY TIMES DO YOU TALK ON THE PHONE WITH FAMILY, FRIENDS, OR NEIGHBORS?: MORE THAN THREE TIMES A WEEK
HOW OFTEN DO YOU ATTEND CHURCH OR RELIGIOUS SERVICES?: 1 TO 4 TIMES PER YEAR
HOW OFTEN DO YOU ATTENT MEETINGS OF THE CLUB OR ORGANIZATION YOU BELONG TO?: PATIENT DECLINED
HOW OFTEN DO YOU GET TOGETHER WITH FRIENDS OR RELATIVES?: MORE THAN THREE TIMES A WEEK
HOW OFTEN DO YOU ATTENT MEETINGS OF THE CLUB OR ORGANIZATION YOU BELONG TO?: PATIENT DECLINED
HOW HARD IS IT FOR YOU TO PAY FOR THE VERY BASICS LIKE FOOD, HOUSING, MEDICAL CARE, AND HEATING?: PATIENT DECLINED
HOW OFTEN DO YOU GET TOGETHER WITH FRIENDS OR RELATIVES?: MORE THAN THREE TIMES A WEEK
ARE YOU MARRIED, WIDOWED, DIVORCED, SEPARATED, NEVER MARRIED, OR LIVING WITH A PARTNER?: LIVING WITH PARTNER
IN A TYPICAL WEEK, HOW MANY TIMES DO YOU TALK ON THE PHONE WITH FAMILY, FRIENDS, OR NEIGHBORS?: MORE THAN THREE TIMES A WEEK
ARE YOU MARRIED, WIDOWED, DIVORCED, SEPARATED, NEVER MARRIED, OR LIVING WITH A PARTNER?: LIVING WITH PARTNER
HOW OFTEN DO YOU ATTEND CHURCH OR RELIGIOUS SERVICES?: 1 TO 4 TIMES PER YEAR
HOW OFTEN DO YOU HAVE A DRINK CONTAINING ALCOHOL: PATIENT DECLINED
HOW MANY DRINKS CONTAINING ALCOHOL DO YOU HAVE ON A TYPICAL DAY WHEN YOU ARE DRINKING: PATIENT DECLINED
HOW OFTEN DO YOU HAVE SIX OR MORE DRINKS ON ONE OCCASION: PATIENT DECLINED
IN THE PAST 12 MONTHS, HAS THE ELECTRIC, GAS, OIL, OR WATER COMPANY THREATENED TO SHUT OFF SERVICE IN YOUR HOME?: PATIENT DECLINED
DO YOU BELONG TO ANY CLUBS OR ORGANIZATIONS SUCH AS CHURCH GROUPS UNIONS, FRATERNAL OR ATHLETIC GROUPS, OR SCHOOL GROUPS?: NO
DO YOU BELONG TO ANY CLUBS OR ORGANIZATIONS SUCH AS CHURCH GROUPS UNIONS, FRATERNAL OR ATHLETIC GROUPS, OR SCHOOL GROUPS?: NO

## 2025-08-05 ASSESSMENT — LIFESTYLE VARIABLES
AUDIT-C TOTAL SCORE: -1
HOW OFTEN DO YOU HAVE SIX OR MORE DRINKS ON ONE OCCASION: PATIENT DECLINED
HOW OFTEN DO YOU HAVE A DRINK CONTAINING ALCOHOL: PATIENT DECLINED
SKIP TO QUESTIONS 9-10: 0
HOW MANY STANDARD DRINKS CONTAINING ALCOHOL DO YOU HAVE ON A TYPICAL DAY: PATIENT DECLINED

## 2025-08-06 ENCOUNTER — APPOINTMENT (OUTPATIENT)
Dept: MEDICAL GROUP | Facility: PHYSICIAN GROUP | Age: 30
End: 2025-08-06
Payer: COMMERCIAL

## 2025-08-06 PROBLEM — E66.3 OVERWEIGHT: Chronic | Status: ACTIVE | Noted: 2024-07-25

## 2025-08-06 PROBLEM — E78.5 DYSLIPIDEMIA: Status: ACTIVE | Noted: 2025-08-06

## 2025-08-06 PROBLEM — Z00.00 ANNUAL WELLNESS VISIT: Status: ACTIVE | Noted: 2025-08-06

## 2025-08-06 PROBLEM — I51.89 FAMILIAL HEART DISEASE: Status: ACTIVE | Noted: 2025-08-06

## 2025-08-06 PROBLEM — E78.5 DYSLIPIDEMIA: Chronic | Status: ACTIVE | Noted: 2025-08-06

## 2025-08-06 PROBLEM — Z82.49 FAMILY HISTORY OF HEART DISEASE: Status: ACTIVE | Noted: 2025-08-06

## 2025-08-06 ASSESSMENT — PATIENT HEALTH QUESTIONNAIRE - PHQ9: CLINICAL INTERPRETATION OF PHQ2 SCORE: 0

## 2025-08-06 ASSESSMENT — FIBROSIS 4 INDEX: FIB4 SCORE: 0.33

## 2025-09-04 ENCOUNTER — APPOINTMENT (OUTPATIENT)
Dept: MEDICAL GROUP | Facility: PHYSICIAN GROUP | Age: 30
End: 2025-09-04
Payer: COMMERCIAL

## (undated) DEVICE — SET EXTENSION WITH 2 PORTS (48EA/CA) ***PART #2C8610 IS A SUBSTITUTE*****

## (undated) DEVICE — BAG RETRIEVAL 10ML (10EA/BX)

## (undated) DEVICE — MASK ANESTHESIA ADULT  - (100/CA)

## (undated) DEVICE — CATHETER IV NON-SAFETY 18 GA X 1 1/4 (50/BX 4BX/CA)

## (undated) DEVICE — DRESSING INTERCEED ABSORBABLE ADHESION BARRIER TC7 (10EA/CA)

## (undated) DEVICE — TROCAR 5X100 NON BLADED Z-TH - READ KII (6/BX)

## (undated) DEVICE — GLOVE BIOGEL SZ 7.5 SURGICAL PF LTX - (50PR/BX 4BX/CA)

## (undated) DEVICE — WATER IRRIGATION STERILE 1000ML (12EA/CA)

## (undated) DEVICE — DRESSING POST OP BORDER 4 X 10 (5EA/BX)

## (undated) DEVICE — SUTURE 3-0 VICRYL PLUS CT-1 - 36 INCH (36/BX)

## (undated) DEVICE — SET LEADWIRE 5 LEAD BEDSIDE DISPOSABLE ECG (1SET OF 5/EA)

## (undated) DEVICE — DETERGENT RENUZYME PLUS 10 OZ PACKET (50/BX)

## (undated) DEVICE — GOWN WARMING STANDARD FLEX - (30/CA)

## (undated) DEVICE — KIT ANESTHESIA W/CIRCUIT & 3/LT BAG W/FILTER (20EA/CA)

## (undated) DEVICE — ADHESIVE DERMABOND HVD MINI (12EA/BX)

## (undated) DEVICE — BLADE SURGICAL CLIPPER - (50EA/CA)

## (undated) DEVICE — KIT ROOM DECONTAMINATION

## (undated) DEVICE — HEAD HOLDER JUNIOR/ADULT

## (undated) DEVICE — TAPE CLOTH MEDIPORE 6 INCH - (12RL/CA)

## (undated) DEVICE — GLOVE BIOGEL PI INDICATOR SZ 6.5 SURGICAL PF LF - (50/BX 4BX/CA)

## (undated) DEVICE — CANNULA W/SEAL 5X100 Z-THRE - ADED KII (12/BX)

## (undated) DEVICE — SUCTION INSTRUMENT YANKAUER BULBOUS TIP W/O VENT (50EA/CA)

## (undated) DEVICE — ELECTRODE 850 FOAM ADHESIVE - HYDROGEL RADIOTRNSPRNT (50/PK)

## (undated) DEVICE — DRAPESURG STERI-DRAPE LONG - (10/BX 4BX/CA)

## (undated) DEVICE — GLOVE SZ 6.5 BIOGEL PI MICRO - PF LF (50PR/BX)

## (undated) DEVICE — SODIUM CHL IRRIGATION 0.9% 1000ML (12EA/CA)

## (undated) DEVICE — SLEEVE, SEQUENTIAL CALF REG

## (undated) DEVICE — NEPTUNE 4 PORT MANIFOLD - (20/PK)

## (undated) DEVICE — LACTATED RINGERS INJ 1000 ML - (14EA/CA 60CA/PF)

## (undated) DEVICE — SUTURE GENERAL

## (undated) DEVICE — SUTURE 0 VICRYL PLUS CT-1 - 36 INCH (36/BX)

## (undated) DEVICE — SENSOR SPO2 NEO LNCS ADHESIVE (20/BX) SEE USER NOTES

## (undated) DEVICE — TRAY SPINAL ANESTHESIA NON-SAFETY (10/CA)

## (undated) DEVICE — SUTURE 0 VICRYL PLUS CT 36 (36PK/BX)"

## (undated) DEVICE — PACK C-SECTION (2EA/CA)

## (undated) DEVICE — KIT  I.V. START (100EA/CA)

## (undated) DEVICE — TUBING CLEARLINK DUO-VENT - C-FLO (48EA/CA)

## (undated) DEVICE — PACK ROOM TURNOVER L&D (12/CA)

## (undated) DEVICE — CANISTER SUCTION 3000ML MECHANICAL FILTER AUTO SHUTOFF MEDI-VAC NONSTERILE LF DISP  (40EA/CA)

## (undated) DEVICE — PACK LAP CHOLE OR - (2EA/CA)

## (undated) DEVICE — TROCAR Z THREAD 11 X 100 - BLADED (6/BX)

## (undated) DEVICE — PROTECTOR ULNA NERVE - (36PR/CA)

## (undated) DEVICE — TUBE E-T HI-LO CUFF 7.5MM (10EA/PK)

## (undated) DEVICE — GLOVE BIOGEL SZ 7 SURGICAL PF LTX - (50PR/BX 4BX/CA)

## (undated) DEVICE — SET SUCTION/IRRIGATION WITH DISPOSABLE TIP (6/CA )PART #0250-070-520 IS A SUB

## (undated) DEVICE — DERMABOND ADVANCED - (12EA/BX)

## (undated) DEVICE — GLOVE BIOGEL SZ 8 SURGICAL PF LTX - (50PR/BX 4BX/CA)

## (undated) DEVICE — STAPLER SKIN DISP - (6/BX 10BX/CA) VISISTAT

## (undated) DEVICE — HEMOSTAT SURG ABSORBABLE - 4 X 8 IN SURGICEL (24EA/CA)

## (undated) DEVICE — CHLORAPREP 26 ML APPLICATOR - ORANGE TINT(25/CA)

## (undated) DEVICE — ELECTRODE DUAL RETURN W/ CORD - (50/PK)

## (undated) DEVICE — SYRINGE 30 ML LL (56/BX)

## (undated) DEVICE — PENCIL ELECTSURG 10FT HLSTR - WITH BLADE (50EA/CA)

## (undated) DEVICE — BLANKET UNDERBODY FULL ACCES - (5/CA)

## (undated) DEVICE — GLOVE BIOGEL INDICATOR SZ 7SURGICAL PF LTX - (50/BX 4BX/CA)

## (undated) DEVICE — WATER IRRIG. STER. 1500 ML - (9/CA)

## (undated) DEVICE — CLIP MED LG INTNL HRZN TI ESCP - (20/BX)

## (undated) DEVICE — SUTURE 2-0 CHROMIC GUT CT-1 27 (36PK/BX)"

## (undated) DEVICE — TUBE CONNECT SUCTION CLEAR 120 X 1/4" (50EA/CA)"

## (undated) DEVICE — SUTURE 0 VICRYL PLUS UR-6 - 27 INCH (36/BX)

## (undated) DEVICE — GOWN SURGEONS LARGE - (32/CA)

## (undated) DEVICE — SUTURE 4-0 VICRYL PLUSFS-1 - 27 INCH (36/BX)

## (undated) DEVICE — SUTURE 1 CHROMIC CTX ETHICON - (36PK/BX)

## (undated) DEVICE — TUBING INSUFFLATION - (10/BX)